# Patient Record
Sex: MALE | Race: WHITE | NOT HISPANIC OR LATINO | Employment: OTHER | URBAN - METROPOLITAN AREA
[De-identification: names, ages, dates, MRNs, and addresses within clinical notes are randomized per-mention and may not be internally consistent; named-entity substitution may affect disease eponyms.]

---

## 2019-07-12 ENCOUNTER — HOSPITAL ENCOUNTER (INPATIENT)
Facility: HOSPITAL | Age: 79
LOS: 1 days | Discharge: HOME | DRG: 442 | End: 2019-07-12
Attending: INTERNAL MEDICINE | Admitting: INTERNAL MEDICINE
Payer: MEDICARE

## 2019-07-12 VITALS
HEIGHT: 70 IN | TEMPERATURE: 99 F | DIASTOLIC BLOOD PRESSURE: 78 MMHG | RESPIRATION RATE: 17 BRPM | SYSTOLIC BLOOD PRESSURE: 174 MMHG | OXYGEN SATURATION: 97 % | WEIGHT: 207.69 LBS | HEART RATE: 57 BPM | BODY MASS INDEX: 29.73 KG/M2

## 2019-07-12 DIAGNOSIS — I81 PORTAL VEIN THROMBOSIS: ICD-10-CM

## 2019-07-12 DIAGNOSIS — I25.10 CORONARY ARTERY DISEASE INVOLVING NATIVE CORONARY ARTERY OF NATIVE HEART WITHOUT ANGINA PECTORIS: Primary | ICD-10-CM

## 2019-07-12 PROBLEM — N40.0 BPH (BENIGN PROSTATIC HYPERPLASIA): Status: ACTIVE | Noted: 2019-07-12

## 2019-07-12 PROBLEM — R16.0 LIVER MASS: Status: ACTIVE | Noted: 2019-07-12

## 2019-07-12 PROBLEM — R10.9 ABDOMINAL PAIN: Status: ACTIVE | Noted: 2019-07-12

## 2019-07-12 LAB
AFP SERPL-MCNC: 373 NG/ML (ref 0–8.4)
ALBUMIN SERPL BCP-MCNC: 3.5 G/DL (ref 3.5–5.2)
ALP SERPL-CCNC: 93 U/L (ref 55–135)
ALT SERPL W/O P-5'-P-CCNC: 30 U/L (ref 10–44)
ANION GAP SERPL CALC-SCNC: 10 MMOL/L (ref 8–16)
APTT BLDCRRT: 36.9 SEC (ref 21–32)
APTT BLDCRRT: 42.9 SEC (ref 21–32)
AST SERPL-CCNC: 26 U/L (ref 10–40)
BASOPHILS # BLD AUTO: 0.04 K/UL (ref 0–0.2)
BASOPHILS # BLD AUTO: 0.04 K/UL (ref 0–0.2)
BASOPHILS NFR BLD: 0.3 % (ref 0–1.9)
BASOPHILS NFR BLD: 0.3 % (ref 0–1.9)
BILIRUB SERPL-MCNC: 1 MG/DL (ref 0.1–1)
BUN SERPL-MCNC: 11 MG/DL (ref 8–23)
CALCIUM SERPL-MCNC: 9.4 MG/DL (ref 8.7–10.5)
CHLORIDE SERPL-SCNC: 104 MMOL/L (ref 95–110)
CO2 SERPL-SCNC: 25 MMOL/L (ref 23–29)
CREAT SERPL-MCNC: 0.9 MG/DL (ref 0.5–1.4)
DIFFERENTIAL METHOD: ABNORMAL
DIFFERENTIAL METHOD: ABNORMAL
EOSINOPHIL # BLD AUTO: 0.2 K/UL (ref 0–0.5)
EOSINOPHIL # BLD AUTO: 0.2 K/UL (ref 0–0.5)
EOSINOPHIL NFR BLD: 1.5 % (ref 0–8)
EOSINOPHIL NFR BLD: 1.5 % (ref 0–8)
ERYTHROCYTE [DISTWIDTH] IN BLOOD BY AUTOMATED COUNT: 13.1 % (ref 11.5–14.5)
ERYTHROCYTE [DISTWIDTH] IN BLOOD BY AUTOMATED COUNT: 13.1 % (ref 11.5–14.5)
EST. GFR  (AFRICAN AMERICAN): >60 ML/MIN/1.73 M^2
EST. GFR  (NON AFRICAN AMERICAN): >60 ML/MIN/1.73 M^2
GLUCOSE SERPL-MCNC: 119 MG/DL (ref 70–110)
HCT VFR BLD AUTO: 38.9 % (ref 40–54)
HCT VFR BLD AUTO: 38.9 % (ref 40–54)
HGB BLD-MCNC: 12.8 G/DL (ref 14–18)
HGB BLD-MCNC: 12.8 G/DL (ref 14–18)
HIV 1+2 AB+HIV1 P24 AG SERPL QL IA: NEGATIVE
INR PPP: 1 (ref 0.8–1.2)
INR PPP: 1 (ref 0.8–1.2)
LYMPHOCYTES # BLD AUTO: 2.2 K/UL (ref 1–4.8)
LYMPHOCYTES # BLD AUTO: 2.2 K/UL (ref 1–4.8)
LYMPHOCYTES NFR BLD: 19 % (ref 18–48)
LYMPHOCYTES NFR BLD: 19 % (ref 18–48)
MAGNESIUM SERPL-MCNC: 1.8 MG/DL (ref 1.6–2.6)
MCH RBC QN AUTO: 30.8 PG (ref 27–31)
MCH RBC QN AUTO: 30.8 PG (ref 27–31)
MCHC RBC AUTO-ENTMCNC: 32.9 G/DL (ref 32–36)
MCHC RBC AUTO-ENTMCNC: 32.9 G/DL (ref 32–36)
MCV RBC AUTO: 94 FL (ref 82–98)
MCV RBC AUTO: 94 FL (ref 82–98)
MONOCYTES # BLD AUTO: 1.3 K/UL (ref 0.3–1)
MONOCYTES # BLD AUTO: 1.3 K/UL (ref 0.3–1)
MONOCYTES NFR BLD: 11.4 % (ref 4–15)
MONOCYTES NFR BLD: 11.4 % (ref 4–15)
NEUTROPHILS # BLD AUTO: 7.8 K/UL (ref 1.8–7.7)
NEUTROPHILS # BLD AUTO: 7.8 K/UL (ref 1.8–7.7)
NEUTROPHILS NFR BLD: 67.8 % (ref 38–73)
NEUTROPHILS NFR BLD: 67.8 % (ref 38–73)
PHOSPHATE SERPL-MCNC: 3.2 MG/DL (ref 2.7–4.5)
PLATELET # BLD AUTO: 276 K/UL (ref 150–350)
PLATELET # BLD AUTO: 276 K/UL (ref 150–350)
PMV BLD AUTO: 10.2 FL (ref 9.2–12.9)
PMV BLD AUTO: 10.2 FL (ref 9.2–12.9)
POTASSIUM SERPL-SCNC: 4.4 MMOL/L (ref 3.5–5.1)
PROT SERPL-MCNC: 6.9 G/DL (ref 6–8.4)
PROTHROMBIN TIME: 11.3 SEC (ref 9–12.5)
PROTHROMBIN TIME: 11.3 SEC (ref 9–12.5)
RBC # BLD AUTO: 4.15 M/UL (ref 4.6–6.2)
RBC # BLD AUTO: 4.15 M/UL (ref 4.6–6.2)
SODIUM SERPL-SCNC: 139 MMOL/L (ref 136–145)
WBC # BLD AUTO: 11.45 K/UL (ref 3.9–12.7)
WBC # BLD AUTO: 11.45 K/UL (ref 3.9–12.7)

## 2019-07-12 PROCEDURE — 25000242 PHARM REV CODE 250 ALT 637 W/ HCPCS: Performed by: INTERNAL MEDICINE

## 2019-07-12 PROCEDURE — 83735 ASSAY OF MAGNESIUM: CPT

## 2019-07-12 PROCEDURE — 99222 1ST HOSP IP/OBS MODERATE 55: CPT | Mod: ,,, | Performed by: NURSE PRACTITIONER

## 2019-07-12 PROCEDURE — 21400001 HC TELEMETRY ROOM

## 2019-07-12 PROCEDURE — 99222 PR INITIAL HOSPITAL CARE,LEVL II: ICD-10-PCS | Mod: ,,, | Performed by: NURSE PRACTITIONER

## 2019-07-12 PROCEDURE — 80053 COMPREHEN METABOLIC PANEL: CPT

## 2019-07-12 PROCEDURE — 36415 COLL VENOUS BLD VENIPUNCTURE: CPT

## 2019-07-12 PROCEDURE — 85025 COMPLETE CBC W/AUTO DIFF WBC: CPT

## 2019-07-12 PROCEDURE — G0378 HOSPITAL OBSERVATION PER HR: HCPCS

## 2019-07-12 PROCEDURE — 86703 HIV-1/HIV-2 1 RESULT ANTBDY: CPT

## 2019-07-12 PROCEDURE — 85730 THROMBOPLASTIN TIME PARTIAL: CPT

## 2019-07-12 PROCEDURE — 80074 ACUTE HEPATITIS PANEL: CPT

## 2019-07-12 PROCEDURE — 85730 THROMBOPLASTIN TIME PARTIAL: CPT | Mod: 91

## 2019-07-12 PROCEDURE — 85610 PROTHROMBIN TIME: CPT

## 2019-07-12 PROCEDURE — 63600175 PHARM REV CODE 636 W HCPCS: Performed by: INTERNAL MEDICINE

## 2019-07-12 PROCEDURE — 99223 1ST HOSP IP/OBS HIGH 75: CPT | Mod: ,,, | Performed by: INTERNAL MEDICINE

## 2019-07-12 PROCEDURE — 25500020 PHARM REV CODE 255: Performed by: INTERNAL MEDICINE

## 2019-07-12 PROCEDURE — 99223 PR INITIAL HOSPITAL CARE,LEVL III: ICD-10-PCS | Mod: ,,, | Performed by: INTERNAL MEDICINE

## 2019-07-12 PROCEDURE — 25000003 PHARM REV CODE 250: Performed by: INTERNAL MEDICINE

## 2019-07-12 PROCEDURE — 84100 ASSAY OF PHOSPHORUS: CPT

## 2019-07-12 PROCEDURE — 82105 ALPHA-FETOPROTEIN SERUM: CPT

## 2019-07-12 RX ORDER — PANTOPRAZOLE SODIUM 40 MG/1
40 TABLET, DELAYED RELEASE ORAL DAILY
Status: DISCONTINUED | OUTPATIENT
Start: 2019-07-12 | End: 2019-07-12 | Stop reason: HOSPADM

## 2019-07-12 RX ORDER — PHENYLEPHRINE HCL 10 MG
1000 TABLET ORAL DAILY
COMMUNITY

## 2019-07-12 RX ORDER — FLUTICASONE PROPIONATE 50 MCG
1 SPRAY, SUSPENSION (ML) NASAL DAILY
COMMUNITY

## 2019-07-12 RX ORDER — MAG HYDROX/ALUMINUM HYD/SIMETH 200-200-20
30 SUSPENSION, ORAL (FINAL DOSE FORM) ORAL EVERY 6 HOURS PRN
Status: DISCONTINUED | OUTPATIENT
Start: 2019-07-12 | End: 2019-07-12 | Stop reason: HOSPADM

## 2019-07-12 RX ORDER — NAPROXEN SODIUM 220 MG/1
81 TABLET, FILM COATED ORAL DAILY
Status: DISCONTINUED | OUTPATIENT
Start: 2019-07-12 | End: 2019-07-12 | Stop reason: HOSPADM

## 2019-07-12 RX ORDER — FLUTICASONE PROPIONATE 50 MCG
1 SPRAY, SUSPENSION (ML) NASAL DAILY
Status: DISCONTINUED | OUTPATIENT
Start: 2019-07-12 | End: 2019-07-12 | Stop reason: HOSPADM

## 2019-07-12 RX ORDER — ACETAMINOPHEN 325 MG/1
650 TABLET ORAL EVERY 6 HOURS PRN
Status: DISCONTINUED | OUTPATIENT
Start: 2019-07-12 | End: 2019-07-12 | Stop reason: HOSPADM

## 2019-07-12 RX ORDER — TAMSULOSIN HYDROCHLORIDE 0.4 MG/1
0.4 CAPSULE ORAL NIGHTLY
Status: DISCONTINUED | OUTPATIENT
Start: 2019-07-12 | End: 2019-07-12 | Stop reason: HOSPADM

## 2019-07-12 RX ORDER — IPRATROPIUM BROMIDE AND ALBUTEROL SULFATE 2.5; .5 MG/3ML; MG/3ML
3 SOLUTION RESPIRATORY (INHALATION) EVERY 4 HOURS PRN
Status: DISCONTINUED | OUTPATIENT
Start: 2019-07-12 | End: 2019-07-12 | Stop reason: HOSPADM

## 2019-07-12 RX ORDER — HEPARIN SODIUM,PORCINE/D5W 25000/250
12 INTRAVENOUS SOLUTION INTRAVENOUS CONTINUOUS
Status: DISCONTINUED | OUTPATIENT
Start: 2019-07-12 | End: 2019-07-12

## 2019-07-12 RX ORDER — MELOXICAM 15 MG/1
15 TABLET ORAL DAILY PRN
COMMUNITY

## 2019-07-12 RX ORDER — DIPHENHYDRAMINE HCL 25 MG
25 CAPSULE ORAL EVERY 6 HOURS PRN
Status: DISCONTINUED | OUTPATIENT
Start: 2019-07-12 | End: 2019-07-12 | Stop reason: HOSPADM

## 2019-07-12 RX ORDER — ONDANSETRON 2 MG/ML
4 INJECTION INTRAMUSCULAR; INTRAVENOUS EVERY 8 HOURS PRN
Status: DISCONTINUED | OUTPATIENT
Start: 2019-07-12 | End: 2019-07-12 | Stop reason: HOSPADM

## 2019-07-12 RX ORDER — ATORVASTATIN CALCIUM 40 MG/1
40 TABLET, FILM COATED ORAL DAILY
Status: DISCONTINUED | OUTPATIENT
Start: 2019-07-12 | End: 2019-07-12 | Stop reason: HOSPADM

## 2019-07-12 RX ORDER — METOPROLOL SUCCINATE 25 MG/1
25 TABLET, EXTENDED RELEASE ORAL DAILY
Status: DISCONTINUED | OUTPATIENT
Start: 2019-07-12 | End: 2019-07-12 | Stop reason: HOSPADM

## 2019-07-12 RX ORDER — HYDRALAZINE HYDROCHLORIDE 20 MG/ML
10 INJECTION INTRAMUSCULAR; INTRAVENOUS EVERY 6 HOURS PRN
Status: DISCONTINUED | OUTPATIENT
Start: 2019-07-12 | End: 2019-07-12 | Stop reason: HOSPADM

## 2019-07-12 RX ADMIN — PANTOPRAZOLE SODIUM 40 MG: 40 TABLET, DELAYED RELEASE ORAL at 08:07

## 2019-07-12 RX ADMIN — IOHEXOL 100 ML: 350 INJECTION, SOLUTION INTRAVENOUS at 11:07

## 2019-07-12 RX ADMIN — ASPIRIN 81 MG CHEWABLE TABLET 81 MG: 81 TABLET CHEWABLE at 08:07

## 2019-07-12 RX ADMIN — HEPARIN SODIUM AND DEXTROSE 13 UNITS/KG/HR: 10000; 5 INJECTION INTRAVENOUS at 01:07

## 2019-07-12 RX ADMIN — ATORVASTATIN CALCIUM 40 MG: 40 TABLET, FILM COATED ORAL at 08:07

## 2019-07-12 RX ADMIN — FLUTICASONE PROPIONATE 50 MCG: 50 SPRAY, METERED NASAL at 08:07

## 2019-07-12 RX ADMIN — HEPARIN SODIUM AND DEXTROSE 12 UNITS/KG/HR: 10000; 5 INJECTION INTRAVENOUS at 06:07

## 2019-07-12 RX ADMIN — APIXABAN 5 MG: 2.5 TABLET, FILM COATED ORAL at 04:07

## 2019-07-12 NOTE — ASSESSMENT & PLAN NOTE
CT imaging reveals portal vein thrombosis.  Currently on IV heparin drip.  Consult IR and GI for further evaluation/recommendations.

## 2019-07-12 NOTE — SUBJECTIVE & OBJECTIVE
Past Medical History:   Diagnosis Date    Arthritis     Coronary artery disease     Hx of heart artery stent        Past Surgical History:   Procedure Laterality Date    CARDIAC SURGERY      stents 12/29/2012    EYE SURGERY      right, skin graft    INJECTION-STEROID-EPIDURAL-CAUDAL N/A 5/20/2016    Performed by Francis Jackson Jr., [facility] at [facility]    SPINAL FUSION      ACDF 05/04/2010    TONSILLECTOMY         Review of patient's allergies indicates:   Allergen Reactions    Pcn [penicillins] Hives     Family History     None        Tobacco Use    Smoking status: Former Smoker    Smokeless tobacco: Former User     Quit date: 5/18/1986   Substance and Sexual Activity    Alcohol use: No    Drug use: Not on file    Sexual activity: Not on file     Review of Systems   Constitutional: Negative for activity change and appetite change.   HENT: Negative for sore throat and trouble swallowing.    Eyes: Negative for pain and discharge.   Respiratory: Negative for cough and chest tightness.    Cardiovascular: Negative for chest pain and palpitations.   Gastrointestinal: Positive for abdominal pain. Negative for blood in stool, constipation, diarrhea, nausea and vomiting.   Genitourinary: Negative for dysuria, frequency and hematuria.   Skin: Negative for color change and rash.   Neurological: Negative for speech difficulty, weakness and headaches.   Psychiatric/Behavioral: Negative for confusion and sleep disturbance.     Objective:     Vital Signs (Most Recent):  Temp: 98.5 °F (36.9 °C) (07/12/19 1120)  Pulse: (!) 57 (07/12/19 1120)  Resp: 18 (07/12/19 1120)  BP: (!) 193/81 (07/12/19 1120)  SpO2: 95 % (07/12/19 1120) Vital Signs (24h Range):  Temp:  [97.7 °F (36.5 °C)-98.5 °F (36.9 °C)] 98.5 °F (36.9 °C)  Pulse:  [49-57] 57  Resp:  [18] 18  SpO2:  [95 %-97 %] 95 %  BP: (130-193)/(63-81) 193/81     Weight: 94.2 kg (207 lb 10.8 oz) (07/12/19 0300)  Body mass index is 29.8 kg/m².      Intake/Output Summary (Last 24  hours) at 7/12/2019 1229  Last data filed at 7/12/2019 0800  Gross per 24 hour   Intake --   Output 400 ml   Net -400 ml       Lines/Drains/Airways          None          Physical Exam   Constitutional: He is oriented to person, place, and time. No distress.   Cardiovascular: Normal rate, regular rhythm and normal heart sounds.   No murmur heard.  Pulmonary/Chest: Effort normal and breath sounds normal. No stridor. No respiratory distress.   Abdominal: Soft. Bowel sounds are normal. He exhibits no distension. There is no tenderness.   Neurological: He is alert and oriented to person, place, and time. No cranial nerve deficit. Coordination normal.   Skin: Skin is warm and dry.   Psychiatric: He has a normal mood and affect.       Significant Labs:  CBC:   Recent Labs   Lab 07/12/19 0455   WBC 11.45  11.45   HGB 12.8*  12.8*   HCT 38.9*  38.9*     276     CMP:   Recent Labs   Lab 07/12/19 0455   *   CALCIUM 9.4   ALBUMIN 3.5   PROT 6.9      K 4.4   CO2 25      BUN 11   CREATININE 0.9   ALKPHOS 93   ALT 30   AST 26   BILITOT 1.0     Coagulation:   Recent Labs   Lab 07/12/19 0455   INR 1.0  1.0   APTT 42.9*       Significant Imaging:  Imaging results within the past 24 hours have been reviewed.

## 2019-07-12 NOTE — CONSULTS
Thank you for the consult.     Review of the chart reveals a 78-year-old male with recent diagnosis of portal vein thrombosis. Patient presented with abdominal discomfort.     Outside CT was reviewed directly by myself and demonstrates heterogeneous appearance of the liver. I am concerned about a mass within the left hepatic lobe with tumor thrombus extending into the left and main portal vein with partially occlusive bland thrombus involving the proximal main portal vein and SMV. Intrahepatic portal v is expanded and appears to enhance which is concerning for tumor thrombus. This is not fully evaluated on single contrast enhanced imaging. Recommend repeat triple phase CT or MRI.     No catheter directed thrombolysis is recommended at this time. Systemic anticoagulation has been initiated and is recommended. If a mass is seen by triple phase imaging, patient may be a candidate for locoregional therapy with Y-90 depending on synthetic function of the liver.

## 2019-07-12 NOTE — HPI
"Patient is a 78 year old male who presented to [facility] for epigastric and lower abdominal pain. CT scan was done that showed PVT and was transferred here for further evaluation/management. Image reviewed by Dr. Ham who described suspicious mass in the left hepatic lobe and concern for tumor thrombus. Triple phase CT scan was ordered and showed "Left segment 4A/B 5.1 cm mass compatible with LIRADS-5 hepatocellular carcinoma.  Also noted is extensive portal vein thrombus involving the left and right portal veins extending into the main portal vein.  On arterial phase, some of this thrombus appears to be enhancing, very suspicious for tumor thrombus.  No appreciable extrahepatic disease". He does not have any overt evidence of liver cirrhosis. He does not have any risk factors for liver disease. He does not drink alcohol. He denies any prior IV drug use, tattoos, or blood transfusions. He does not have any risk factors for fatty liver disease. No family history of liver disease.   "

## 2019-07-12 NOTE — PLAN OF CARE
Met with patient and family. Patient is independent with adls and iadls.  Patient denies any post hospital needs or services at this time. He has had home health in the past after his neck and back surgery.  Does not remember the name of the agency.  Patient lives with his wife, Maddy Arellano.  He requested [facility] Pharm[facility] delivery, referral entered.  He does not have and Advanced Directive/Living will.  Encouraged to review information given. States he has access to [facility].       Updated white board with 's name and number. Transitional Care Folder, Discharge Planning Begins on Admission pamphlet, [facility] Bedside Delivery pamphlet, Advance Directive information given to patient along with the contact information given.Instructed patient or family to call with any questions or concerns.        [facility] - [place], [place] - [address]  [facility] [place] [address]  Phone: [phone] Fax: [phone]    David Stevens MD  Payor: MEDICARE / Plan: MEDICARE PART A & B / Product Type: [facility] /         07/12/19 1059   Discharge Assessment   Assessment Type Discharge Planning Assessment   Confirmed/corrected address and phone number on facesheet? Yes   Assessment information obtained from? Patient;Caregiver   Expected Length of Stay (days)   (TBD)   Communicated expected length of stay with patient/caregiver yes   Prior to hospitilization cognitive status: Alert/Oriented   Prior to hospitalization functional status: Independent   Current cognitive status: Alert/Oriented   Current Functional Status: Independent   Lives With spouse   Able to Return to Prior Arrangements yes   Is patient able to care for self after discharge? Yes   Who are your caregiver(s) and their phone number(s)? Maddy Arellano, spouse  [phone]   Patient's perception of discharge disposition home or selfcare   Patient currently being followed by outpatient case management? No   Patient currently receives any other  outside agency services? No   Equipment Currently Used at Home glucometer   Do you have any problems affording any of your prescribed medications? No   Is the patient taking medications as prescribed? yes   Does the patient have transportation home? Yes   Transportation Anticipated family or friend will provide   Does the patient receive services at the [facility]? No   Discharge Plan A Home with family   Discharge Plan B Home Health   DME Needed Upon Discharge  none   Patient/Family in Agreement with Plan yes

## 2019-07-12 NOTE — ASSESSMENT & PLAN NOTE
Very suspicious for tumor thrombus  Will ask for hematology to weigh in on anticoagulation needs.

## 2019-07-12 NOTE — NURSING
Went over discharge instructions with patient and wife.   Stressed importance of making and keeping all follow ups as well as making prescribed medication changes.   Prescription x1 to be delivered to pt bedside via [facility] prior to discharge.  Patient verbalized understanding and has no questions in regards to discharge.  IV and tele monitor removed per primary nurse.  Patient awaiting med delivery, instructed to call nurse station once med has arrived.  Primary nurse notified of pt's discharge status.

## 2019-07-12 NOTE — PLAN OF CARE
Problem: Fall Injury Risk  Goal: Absence of Fall and Fall-Related Injury    Intervention: Promote Injury-Free Environment  Patient awake and alert resting in bed, sinus bradycardic rhythm on monitor, denies pain at this time, frequent weight shift encouraged, POC reviewed with patient,  bed low locked, call light within reach, will continue to monitor

## 2019-07-12 NOTE — CONSULTS
"[facility] -   Gastroenterology  Consult Note    Patient Name: Ottoniel Arellano Jr.  MRN: [id number]  Admission Date: 7/12/2019  Hospital Length of Stay: 0 days  Code Status: No Order   Attending Provider: Jeremi Chauhan MD   Consulting Provider: Priscilla Juarez NP  Primary Care Physician: David Stevens MD  Principal Problem:Portal vein thrombosis    Inpatient consult to Gastroenterology  Consult performed by: Priscilla Juarez NP  Consult ordered by: Ari Swanson MD        Subjective:     HPI:  Patient is a 78 year old male who presented to [facility] for epigastric and lower abdominal pain. CT scan was done that showed PVT and was transferred here for further evaluation/management. Image reviewed by Dr. Ham who described suspicious mass in the left hepatic lobe and concern for tumor thrombus. Triple phase CT scan was ordered and showed "Left segment 4A/B 5.1 cm mass compatible with LIRADS-5 hepatocellular carcinoma.  Also noted is extensive portal vein thrombus involving the left and right portal veins extending into the main portal vein.  On arterial phase, some of this thrombus appears to be enhancing, very suspicious for tumor thrombus.  No appreciable extrahepatic disease". He does not have any overt evidence of liver cirrhosis. He does not have any risk factors for liver disease. He does not drink alcohol. He denies any prior IV drug use, tattoos, or blood transfusions. He does not have any risk factors for fatty liver disease. No family history of liver disease.     No new subjective & objective note has been filed under this hospital service since the last note was generated.    Assessment/Plan:     * Portal vein thrombosis  Very suspicious for tumor thrombus  Will ask for hem/onc to weigh in on anticoagulation needs and liver mass.        Liver mass  Patient presented to [facility] for PVT.   - imaging reviewed which revealed suspicious liver mass with concern for tumor " thrombus  - HCC was confirmed on CT swan. Very suspicious of tumor thrombus.   - Per radiologist, If a mass is seen by triple phase imaging, patient may be a candidate for locoregional therapy with Y-90 depending on synthetic function of the liver.   - will also ask Hem/onc to weigh in       Thank you for your consult. I will follow-up with patient. Please contact us if you have any additional questions.    Priscilla Juarez NP  Gastroenterology  [facility] [place]

## 2019-07-12 NOTE — ASSESSMENT & PLAN NOTE
Reviewed images with patient and photograph demonstrating clot in portal vein as well as large left liver mass.  Highly suggestive of probable hepatocellular carcinoma.  Liver function studies ordered alpha fetoprotein ordered.  Patient should be treated with intravenous heparin and subsequently converted to oral agents such as Eliquis.  Low-molecular heparin warfarin or best known but there have been increasing reports of using Eliquis in this setting.  Patient has had a dramatic improvement over last 24 hr once patient is discharged would see in the outpatient setting to arrange for CT-directed biopsy of liver mass.  Information on acute portal vein thrombosis from up-to-date followed to patient electronically

## 2019-07-12 NOTE — HPI
Mr. Arellano is a 78-year-old  male with PMH significant for HTN, CAD, BPH, was transferred from [facility] for higher level of care for portal vein thrombosis.  Patient reports that for the past two weeks he has been having abdominal discomfort mainly in the upper abdomen.  Yesterday afternoon he started having lower abdominal discomfort, and he could not pass it off for gas.  Hence presented to the ED.  CT abdomen reveals portal vein thrombosis.  Patient was started on heparin drip and transferred here for further management.  Patient complains of mild nausea, but denies vomiting, diarrhea.  Denies shortness of breath.  Laboratory workup is unremarkable, all within normal limits.  Admitting diagnosis portal vein thrombosis, on heparin drip.

## 2019-07-12 NOTE — CONSULTS
[facility] -   Hematology/Oncology  Consult Note    Patient Name: Ottoniel Arellnao Jr.  MRN: [id number]  Admission Date: 7/12/2019  Hospital Length of Stay: 0 days  Code Status: No Order   Attending Provider: Jeremi Chauhan MD  Consulting Provider: Jarod Kevin MD  Primary Care Physician: David Stevens MD  Principal Problem:Portal vein thrombosis    Consults  Subjective:     HPI:  78-year-old male with increasing abdominal pain right upper quadrant over the last 3 weeks patient had severe pain after eating yesterday was brought to the emergency room patient underwent a CT abdomen pelvis was found to have acute portal vein thrombosis with a large left hepatocellular mass.  We were asked to see the patient for further evaluation    Oncology Treatment Plan:   [No treatment plan]    Medications:  Continuous Infusions:   heparin (porcine) in D5W 13 Units/kg/hr (07/12/19 1349)     Scheduled Meds:   aspirin  81 mg Oral Daily    atorvastatin  40 mg Oral Daily    fluticasone propionate  1 spray Each Nare Daily    metoprolol succinate  25 mg Oral Daily    pantoprazole  40 mg Oral Daily    tamsulosin  0.4 mg Oral QHS     PRN Meds:acetaminophen, albuterol-ipratropium, aluminum-magnesium hydroxide-simethicone, diphenhydrAMINE, hydrALAZINE, ondansetron     Review of patient's allergies indicates:   Allergen Reactions    Pcn [penicillins] Hives        Past Medical History:   Diagnosis Date    Arthritis     Coronary artery disease     Hx of heart artery stent      Past Surgical History:   Procedure Laterality Date    CARDIAC SURGERY      stents 12/29/2012    EYE SURGERY      right, skin graft    INJECTION-STEROID-EPIDURAL-CAUDAL N/A 5/20/2016    [facility]    SPINAL FUSION      ACDF 05/04/2010    TONSILLECTOMY       Family History     None        Tobacco Use    Smoking status: Former Smoker    Smokeless tobacco: Former User     Quit date: 5/18/1986   Substance  and Sexual Activity    Alcohol use: No    Drug use: Not on file    Sexual activity: Not on file       Review of Systems   Constitutional: Positive for activity change, appetite change and fatigue. Negative for chills, diaphoresis, fever and unexpected weight change.   HENT: Negative for congestion, dental problem, drooling, ear discharge, ear pain, facial swelling, hearing loss, mouth sores, nosebleeds, postnasal drip, rhinorrhea, sinus pressure, sneezing, sore throat, tinnitus, trouble swallowing and voice change.    Eyes: Negative for photophobia, pain, discharge, redness, itching and visual disturbance.   Respiratory: Negative for apnea, cough, choking, chest tightness, shortness of breath, wheezing and stridor.    Cardiovascular: Negative for chest pain, palpitations and leg swelling.   Gastrointestinal: Positive for abdominal pain. Negative for abdominal distention, anal bleeding, blood in stool, constipation, diarrhea, nausea, rectal pain and vomiting.   Endocrine: Negative for cold intolerance, heat intolerance, polydipsia, polyphagia and polyuria.   Genitourinary: Negative for decreased urine volume, difficulty urinating, discharge, dysuria, enuresis, flank pain, frequency, genital sores, hematuria, penile pain, penile swelling, scrotal swelling, testicular pain and urgency.   Musculoskeletal: Negative for arthralgias, back pain, gait problem, joint swelling, myalgias, neck pain and neck stiffness.   Skin: Negative for color change, pallor, rash and wound.   Allergic/Immunologic: Negative for environmental allergies, food allergies and immunocompromised state.   Neurological: Positive for weakness. Negative for dizziness, tremors, seizures, syncope, facial asymmetry, speech difficulty, light-headedness, numbness and headaches.   Hematological: Negative for adenopathy. Does not bruise/bleed easily.   Psychiatric/Behavioral: Positive for dysphoric mood. Negative for agitation, behavioral problems,  confusion, decreased concentration, hallucinations, self-injury, sleep disturbance and suicidal ideas. The patient is nervous/anxious. The patient is not hyperactive.      Objective:     Vital Signs (Most Recent):  Temp: 98.5 °F (36.9 °C) (07/12/19 1120)  Pulse: (!) 57 (07/12/19 1120)  Resp: 18 (07/12/19 1120)  BP: (!) 193/81 (07/12/19 1120)  SpO2: 95 % (07/12/19 1120) Vital Signs (24h Range):  Temp:  [97.7 °F (36.5 °C)-98.5 °F (36.9 °C)] 98.5 °F (36.9 °C)  Pulse:  [49-57] 57  Resp:  [18] 18  SpO2:  [95 %-97 %] 95 %  BP: (130-193)/(63-81) 193/81     Weight: 94.2 kg (207 lb 10.8 oz)  Body mass index is 29.8 kg/m².  Body surface area is 2.16 meters squared.      Intake/Output Summary (Last 24 hours) at 7/12/2019 1517  Last data filed at 7/12/2019 1400  Gross per 24 hour   Intake --   Output 1000 ml   Net -1000 ml       Physical Exam   Constitutional: He is oriented to person, place, and time. He appears distressed.   HENT:   Head: Normocephalic.   Right Ear: External ear normal.   Left Ear: External ear normal.   Nose: Nose normal. Right sinus exhibits no maxillary sinus tenderness and no frontal sinus tenderness. Left sinus exhibits no maxillary sinus tenderness and no frontal sinus tenderness.   Mouth/Throat: Oropharynx is clear and moist. No oropharyngeal exudate.   Eyes: Pupils are equal, round, and reactive to light. EOM and lids are normal. Right eye exhibits no discharge. Left eye exhibits no discharge. Right conjunctiva is not injected. Right conjunctiva has no hemorrhage. Left conjunctiva is not injected. Left conjunctiva has no hemorrhage. No scleral icterus. Right eye exhibits normal extraocular motion. Left eye exhibits normal extraocular motion.   Neck: Normal range of motion. Neck supple. No JVD present. No tracheal deviation present. No thyromegaly present.   Cardiovascular: Normal rate, regular rhythm and normal heart sounds.   Pulmonary/Chest: Effort normal and breath sounds normal. No stridor. No  respiratory distress.   Abdominal: Soft. Bowel sounds are normal. He exhibits no mass. There is no hepatosplenomegaly, splenomegaly or hepatomegaly. There is tenderness.       Musculoskeletal: Normal range of motion. He exhibits no edema or tenderness.   Lymphadenopathy:        Head (right side): No posterior auricular and no occipital adenopathy present.        Head (left side): No posterior auricular and no occipital adenopathy present.     He has no cervical adenopathy.        Right cervical: No superficial cervical, no deep cervical and no posterior cervical adenopathy present.       Left cervical: No superficial cervical, no deep cervical and no posterior cervical adenopathy present.     He has no axillary adenopathy.        Right: No supraclavicular adenopathy present.        Left: No supraclavicular adenopathy present.   Neurological: He is alert and oriented to person, place, and time. He has normal strength. No cranial nerve deficit. Coordination normal.   Skin: Skin is dry. No rash noted. He is not diaphoretic. No cyanosis or erythema. Nails show no clubbing.   Psychiatric: He has a normal mood and affect. His behavior is normal. Judgment and thought content normal. Cognition and memory are normal.   Vitals reviewed.      Significant Labs:   BMP:   Recent Labs   Lab 07/12/19  0455   *      K 4.4      CO2 25   BUN 11   CREATININE 0.9   CALCIUM 9.4   MG 1.8   , CBC:   Recent Labs   Lab 07/12/19 0455   WBC 11.45  11.45   HGB 12.8*  12.8*   HCT 38.9*  38.9*     276   , CMP:   Recent Labs   Lab 07/12/19  0455      K 4.4      CO2 25   *   BUN 11   CREATININE 0.9   CALCIUM 9.4   PROT 6.9   ALBUMIN 3.5   BILITOT 1.0   ALKPHOS 93   AST 26   ALT 30   ANIONGAP 10   EGFRNONAA >60   , Coagulation:   Recent Labs   Lab 07/12/19  0455 07/12/19  1252   INR 1.0  1.0  --    APTT 42.9* 36.9*   , Haptoglobin: No results for input(s): HAPTOGLOBIN in the last 48 hours.,  Immunology: No results for input(s): SPEP, HANNAH, MOISES, FREELAMBDALI in the last 48 hours., LDH: No results for input(s): LDHCSF, BFSOURCE in the last 48 hours., LFTs:   Recent Labs   Lab 07/12/19  0455   ALT 30   AST 26   ALKPHOS 93   BILITOT 1.0   PROT 6.9   ALBUMIN 3.5   , Reticulocytes: No results for input(s): RETIC in the last 48 hours., Tumor Markers: No results for input(s): PSA, CEA, , AFPTM, [id number],  in the last 48 hours.    Invalid input(s): ALGTM, Uric Acid No results for input(s): URICACID in the last 48 hours. and Urine Studies: No results for input(s): COLORU, APPEARANCEUA, PHUR, SPECGRAV, PROTEINUA, GLUCUA, KETONESU, BILIRUBINUA, OCCULTUA, NITRITE, UROBILINOGEN, LEUKOCYTESUR, RBCUA, WBCUA, BACTERIA, SQUAMEPITHEL, HYALINECASTS in the last 48 hours.    Invalid input(s): WRIGHTSUR    Diagnostic Results:  I have reviewed all pertinent imaging results/findings within the past 24 hours.    Assessment/Plan:     * Portal vein thrombosis  Reviewed images with patient and photograph demonstrating clot in portal vein as well as large left liver mass.  Highly suggestive of probable hepatocellular carcinoma.  Liver function studies ordered alpha fetoprotein ordered.  Patient should be treated with intravenous heparin and subsequently converted to oral agents such as Eliquis.  Low-molecular heparin warfarin or best known but there have been increasing reports of using Eliquis in this setting.  Patient has had a dramatic improvement over last 24 hr once patient is discharged would see in the outpatient setting to arrange for CT-directed biopsy of liver mass.  Information on acute portal vein thrombosis from up-to-date followed to patient electronically discussed case personally with Hospital Medicine attending        Thank you for your consult. I will follow-up with patient. Please contact us if you have any additional questions.    Jarod Kevin MD  Hematology/Oncology  [facility] -

## 2019-07-12 NOTE — SUBJECTIVE & OBJECTIVE
Oncology Treatment Plan:   [No treatment plan]    Medications:  Continuous Infusions:   heparin (porcine) in D5W 13 Units/kg/hr (07/12/19 1349)     Scheduled Meds:   aspirin  81 mg Oral Daily    atorvastatin  40 mg Oral Daily    fluticasone propionate  1 spray Each Nare Daily    metoprolol succinate  25 mg Oral Daily    pantoprazole  40 mg Oral Daily    tamsulosin  0.4 mg Oral QHS     PRN Meds:acetaminophen, albuterol-ipratropium, aluminum-magnesium hydroxide-simethicone, diphenhydrAMINE, hydrALAZINE, ondansetron     Review of patient's allergies indicates:   Allergen Reactions    Pcn [penicillins] Hives        Past Medical History:   Diagnosis Date    Arthritis     Coronary artery disease     Hx of heart artery stent      Past Surgical History:   Procedure Laterality Date    CARDIAC SURGERY      stents 12/29/2012    EYE SURGERY      right, skin graft    INJECTION-STEROID-EPIDURAL-CAUDAL N/A 5/20/2016    Performed by Francis Jackson Jr., MD at [facility]    SPINAL FUSION      ACDF 05/04/2010    TONSILLECTOMY       Family History     None        Tobacco Use    Smoking status: Former Smoker    Smokeless tobacco: Former User     Quit date: 5/18/1986   Substance and Sexual Activity    Alcohol use: No    Drug use: Not on file    Sexual activity: Not on file       Review of Systems   Constitutional: Positive for activity change, appetite change and fatigue. Negative for chills, diaphoresis, fever and unexpected weight change.   HENT: Negative for congestion, dental problem, drooling, ear discharge, ear pain, facial swelling, hearing loss, mouth sores, nosebleeds, postnasal drip, rhinorrhea, sinus pressure, sneezing, sore throat, tinnitus, trouble swallowing and voice change.    Eyes: Negative for photophobia, pain, discharge, redness, itching and visual disturbance.   Respiratory: Negative for apnea, cough, choking, chest tightness, shortness of breath, wheezing and stridor.    Cardiovascular: Negative  for chest pain, palpitations and leg swelling.   Gastrointestinal: Positive for abdominal pain. Negative for abdominal distention, anal bleeding, blood in stool, constipation, diarrhea, nausea, rectal pain and vomiting.   Endocrine: Negative for cold intolerance, heat intolerance, polydipsia, polyphagia and polyuria.   Genitourinary: Negative for decreased urine volume, difficulty urinating, discharge, dysuria, enuresis, flank pain, frequency, genital sores, hematuria, penile pain, penile swelling, scrotal swelling, testicular pain and urgency.   Musculoskeletal: Negative for arthralgias, back pain, gait problem, joint swelling, myalgias, neck pain and neck stiffness.   Skin: Negative for color change, pallor, rash and wound.   Allergic/Immunologic: Negative for environmental allergies, food allergies and immunocompromised state.   Neurological: Positive for weakness. Negative for dizziness, tremors, seizures, syncope, facial asymmetry, speech difficulty, light-headedness, numbness and headaches.   Hematological: Negative for adenopathy. Does not bruise/bleed easily.   Psychiatric/Behavioral: Positive for dysphoric mood. Negative for agitation, behavioral problems, confusion, decreased concentration, hallucinations, self-injury, sleep disturbance and suicidal ideas. The patient is nervous/anxious. The patient is not hyperactive.      Objective:     Vital Signs (Most Recent):  Temp: 98.5 °F (36.9 °C) (07/12/19 1120)  Pulse: (!) 57 (07/12/19 1120)  Resp: 18 (07/12/19 1120)  BP: (!) 193/81 (07/12/19 1120)  SpO2: 95 % (07/12/19 1120) Vital Signs (24h Range):  Temp:  [97.7 °F (36.5 °C)-98.5 °F (36.9 °C)] 98.5 °F (36.9 °C)  Pulse:  [49-57] 57  Resp:  [18] 18  SpO2:  [95 %-97 %] 95 %  BP: (130-193)/(63-81) 193/81     Weight: 94.2 kg (207 lb 10.8 oz)  Body mass index is 29.8 kg/m².  Body surface area is 2.16 meters squared.      Intake/Output Summary (Last 24 hours) at 7/12/2019 4365  Last data filed at 7/12/2019  1400  Gross per 24 hour   Intake --   Output 1000 ml   Net -1000 ml       Physical Exam   Constitutional: He is oriented to person, place, and time. He appears distressed.   HENT:   Head: Normocephalic.   Right Ear: External ear normal.   Left Ear: External ear normal.   Nose: Nose normal. Right sinus exhibits no maxillary sinus tenderness and no frontal sinus tenderness. Left sinus exhibits no maxillary sinus tenderness and no frontal sinus tenderness.   Mouth/Throat: Oropharynx is clear and moist. No oropharyngeal exudate.   Eyes: Pupils are equal, round, and reactive to light. EOM and lids are normal. Right eye exhibits no discharge. Left eye exhibits no discharge. Right conjunctiva is not injected. Right conjunctiva has no hemorrhage. Left conjunctiva is not injected. Left conjunctiva has no hemorrhage. No scleral icterus. Right eye exhibits normal extraocular motion. Left eye exhibits normal extraocular motion.   Neck: Normal range of motion. Neck supple. No JVD present. No tracheal deviation present. No thyromegaly present.   Cardiovascular: Normal rate, regular rhythm and normal heart sounds.   Pulmonary/Chest: Effort normal and breath sounds normal. No stridor. No respiratory distress.   Abdominal: Soft. Bowel sounds are normal. He exhibits no mass. There is no hepatosplenomegaly, splenomegaly or hepatomegaly. There is tenderness.       Musculoskeletal: Normal range of motion. He exhibits no edema or tenderness.   Lymphadenopathy:        Head (right side): No posterior auricular and no occipital adenopathy present.        Head (left side): No posterior auricular and no occipital adenopathy present.     He has no cervical adenopathy.        Right cervical: No superficial cervical, no deep cervical and no posterior cervical adenopathy present.       Left cervical: No superficial cervical, no deep cervical and no posterior cervical adenopathy present.     He has no axillary adenopathy.        Right: No  supraclavicular adenopathy present.        Left: No supraclavicular adenopathy present.   Neurological: He is alert and oriented to person, place, and time. He has normal strength. No cranial nerve deficit. Coordination normal.   Skin: Skin is dry. No rash noted. He is not diaphoretic. No cyanosis or erythema. Nails show no clubbing.   Psychiatric: He has a normal mood and affect. His behavior is normal. Judgment and thought content normal. Cognition and memory are normal.   Vitals reviewed.      Significant Labs:   BMP:   Recent Labs   Lab 07/12/19 0455   *      K 4.4      CO2 25   BUN 11   CREATININE 0.9   CALCIUM 9.4   MG 1.8   , CBC:   Recent Labs   Lab 07/12/19 0455   WBC 11.45  11.45   HGB 12.8*  12.8*   HCT 38.9*  38.9*     276   , CMP:   Recent Labs   Lab 07/12/19 0455      K 4.4      CO2 25   *   BUN 11   CREATININE 0.9   CALCIUM 9.4   PROT 6.9   ALBUMIN 3.5   BILITOT 1.0   ALKPHOS 93   AST 26   ALT 30   ANIONGAP 10   EGFRNONAA >60   , Coagulation:   Recent Labs   Lab 07/12/19 0455 07/12/19  1252   INR 1.0  1.0  --    APTT 42.9* 36.9*   , Haptoglobin: No results for input(s): HAPTOGLOBIN in the last 48 hours., Immunology: No results for input(s): SPEP, HANNAH, MOISES, FREELAMBDALI in the last 48 hours., LDH: No results for input(s): LDHCSF, BFSOURCE in the last 48 hours., LFTs:   Recent Labs   Lab 07/12/19 0455   ALT 30   AST 26   ALKPHOS 93   BILITOT 1.0   PROT 6.9   ALBUMIN 3.5   , Reticulocytes: No results for input(s): RETIC in the last 48 hours., Tumor Markers: No results for input(s): PSA, CEA, , AFPTM, [id number],  in the last 48 hours.    Invalid input(s): ALGTM, Uric Acid No results for input(s): URICACID in the last 48 hours. and Urine Studies: No results for input(s): COLORU, APPEARANCEUA, PHUR, SPECGRAV, PROTEINUA, GLUCUA, KETONESU, BILIRUBINUA, OCCULTUA, NITRITE, UROBILINOGEN, LEUKOCYTESUR, RBCUA, WBCUA, BACTERIA, SQUAMEPITHEL, HYALINECASTS  in the last 48 hours.    Invalid input(s): LENO    Diagnostic Results:  I have reviewed all pertinent imaging results/findings within the past 24 hours.

## 2019-07-12 NOTE — H&P
[facility] Medicine  History & Physical    Patient Name: Ottoniel Arellano Jr.  MRN: [id number]  Admission Date: 7/12/2019  Attending Physician: Ari Swanson MD  Primary Care Provider: David Stevens MD         Patient information was obtained from patient, past medical records and ER records.     Subjective:     Principal Problem:Portal vein thrombosis    Chief Complaint: No chief complaint on file.       HPI: Mr. Arellano is a 78-year-old  male with PMH significant for HTN, CAD, BPH, was transferred from [facility] for higher level of care for portal vein thrombosis.  Patient reports that for the past two weeks he has been having abdominal discomfort mainly in the upper abdomen.  Yesterday afternoon he started having lower abdominal discomfort, and he could not pass it off for gas.  Hence presented to the ED.  CT abdomen reveals portal vein thrombosis.  Patient was started on heparin drip and transferred here for further management.  Patient complains of mild nausea, but denies vomiting, diarrhea.  Denies shortness of breath.  Laboratory workup is unremarkable, all within normal limits.  Admitting diagnosis portal vein thrombosis, on heparin drip.    Past Medical History:   Diagnosis Date    Arthritis     Coronary artery disease     Hx of heart artery stent        Past Surgical History:   Procedure Laterality Date    CARDIAC SURGERY      stents 12/29/2012    EYE SURGERY      right, skin graft    INJECTION-STEROID-EPIDURAL-CAUDAL N/A 5/20/2016    Performed by Francis Jackson Jr., MD at [facility] OR    SPINAL FUSION      ACDF 05/04/2010    TONSILLECTOMY         Review of patient's allergies indicates:   Allergen Reactions    Pcn [penicillins] Hives       No current facility-administered medications on file prior to encounter.      Current Outpatient Medications on File Prior to Encounter   Medication Sig    cinnamon bark 500 mg capsule Take 1,000 mg by mouth once daily.     fluticasone propionate (FLONASE ALLERGY RELIEF) 50 mcg/actuation nasal spray 1 spray by Each Nare route once daily.    meloxicam (MOBIC) 15 MG tablet Take 15 mg by mouth daily as needed for Pain.    ascorbic acid (VITAMIN C) 500 MG tablet Take 500 mg by mouth once daily.    aspirin 81 MG Chew Take 81 mg by mouth once daily.    atorvastatin (LIPITOR) 40 MG tablet Take 40 mg by mouth once daily.     ERGOCALCIFEROL, VITAMIN D2, (VITAMIN D ORAL) Take 1 tablet by mouth once daily.    fish oil-omega-3 fatty acids 300-1,000 mg capsule Take 2 g by mouth once daily.    metoprolol succinate (TOPROL-XL) 25 MG 24 hr tablet Take 25 mg by mouth once daily.    NEXIUM 40 mg capsule Take 40 mg by mouth daily as needed.     promethazine (PHENERGAN) 12.5 MG Tab Take 1 tablet (12.5 mg total) by mouth every 6 to 8 hours as needed.    tamsulosin (FLOMAX) 0.4 mg Cp24 Take 0.4 mg by mouth every evening.     vitamin E 400 unit Tab Take 1 tablet by mouth once daily.    [DISCONTINUED] hydrocodone-acetaminophen 10-325mg (NORCO)  mg Tab Take 1 tablet by mouth every 6 to 8 hours as needed.    [DISCONTINUED] MEN'S MULTI-VITAMIN ORAL Take 1 tablet by mouth once daily.    [DISCONTINUED] oxycodone-acetaminophen (PERCOCET)  mg per tablet Take 1 tablet by mouth every 6 to 8 hours as needed for Pain.     Family History     Reviewed and Not Pertinent        Tobacco Use    Smoking status: Former Smoker    Smokeless tobacco: Former User     Quit date: 5/18/1986   Substance and Sexual Activity    Alcohol use: No    Drug use: Not on file    Sexual activity: Not on file     Review of Systems   Constitutional: Negative.  Negative for appetite change, fatigue and fever.   HENT: Negative.  Negative for congestion, nosebleeds and sore throat.    Eyes: Negative.  Negative for visual disturbance.   Respiratory: Negative.  Negative for cough, shortness of breath and wheezing.    Cardiovascular: Negative.  Negative for chest pain,  palpitations and leg swelling.   Gastrointestinal: Positive for abdominal pain and nausea. Negative for constipation, diarrhea and vomiting.   Endocrine: Negative.  Negative for polydipsia, polyphagia and polyuria.   Genitourinary: Negative.  Negative for dysuria, flank pain, frequency and urgency.   Musculoskeletal: Negative.  Negative for arthralgias, back pain and joint swelling.   Skin: Negative.  Negative for color change, pallor and rash.   Allergic/Immunologic: Negative.  Negative for environmental allergies, food allergies and immunocompromised state.   Neurological: Negative.  Negative for dizziness, syncope, weakness, light-headedness, numbness and headaches.   Hematological: Negative.    Psychiatric/Behavioral: Negative.  Negative for confusion and hallucinations. The patient is not nervous/anxious.    All other systems reviewed and are negative.    Objective:     Vital Signs (Most Recent):  Temp: 97.7 °F (36.5 °C) (07/12/19 0400)  Pulse: (!) 55 (07/12/19 0400)  Resp: 18 (07/12/19 0400)  BP: (!) 181/78 (07/12/19 0400)  SpO2: 97 % (07/12/19 0400) Vital Signs (24h Range):  Temp:  [97.7 °F (36.5 °C)] 97.7 °F (36.5 °C)  Pulse:  [55] 55  Resp:  [18] 18  SpO2:  [97 %] 97 %  BP: (181)/(78) 181/78     Weight: 94.2 kg (207 lb 10.8 oz)  Body mass index is 29.8 kg/m².    Physical Exam   Constitutional: He is oriented to person, place, and time. He appears well-developed and well-nourished. No distress.   HENT:   Head: Normocephalic and atraumatic.   Eyes: Pupils are equal, round, and reactive to light. Conjunctivae and EOM are normal. No scleral icterus.   Neck: Normal range of motion. Neck supple. No thyromegaly present.   Cardiovascular: Normal rate, regular rhythm and normal heart sounds.   No murmur heard.  Pulmonary/Chest: Effort normal and breath sounds normal. No respiratory distress. He has no wheezes. He exhibits no tenderness.   Abdominal: Soft. Bowel sounds are normal. There is tenderness (Mild  generalized abdominal discomfort).   Musculoskeletal: Normal range of motion. He exhibits no edema or tenderness.   Lymphadenopathy:     He has no cervical adenopathy.   Neurological: He is alert and oriented to person, place, and time. No cranial nerve deficit. He exhibits normal muscle tone. Coordination normal.   Skin: Skin is warm and dry. He is not diaphoretic.   Psychiatric: He has a normal mood and affect. His behavior is normal. Thought content normal.   Nursing note and vitals reviewed.        CRANIAL NERVES     CN III, IV, VI   Pupils are equal, round, and reactive to light.  Extraocular motions are normal.        Significant Labs: All pertinent labs within the past 24 hours have been reviewed.     Laboratory workup done at [facility] reviewed, all blood work within normal limits.    Significant Imaging: I have reviewed and interpreted all pertinent imaging results/findings within the past 24 hours.             Assessment/Plan:     * Portal vein thrombosis  CT imaging reveals portal vein thrombosis.  Currently on IV heparin drip.  Consult IR and GI for further evaluation/recommendations.        Abdominal pain  Secondary to portal vein thrombosis of unclear etiology.  Continue pain medications as needed.      CAD (coronary artery disease)  Denies chest pain or shortness of breath.  Continue home dose aspirin, beta-blocker, statin.      BPH (benign prostatic hyperplasia)  Without symptoms.    Continue home dose of Flomax.        VTE Risk Mitigation (From admission, onward)        Ordered     heparin 25,000 units in dextrose 5% 250 ml (100 units/mL) infusion MINIMAL INTENSITY nomogram - [facility]  Continuous      07/12/19 0549     Place sequential compression device  Until discontinued      07/12/19 0554             Ari Swanson MD  Department of Hospital Medicine   [facility]

## 2019-07-12 NOTE — ASSESSMENT & PLAN NOTE
Very suspicious for tumor thrombus  Will ask for hem/onc to weigh in on anticoagulation needs and liver mass.

## 2019-07-12 NOTE — ASSESSMENT & PLAN NOTE
Patient presented to [facility] for PVT.   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 Very suspicious of tumor thrombus.   - Per radiologist, If a mass is seen by triple phase imaging, patient may be a candidate for locoregional therapy with Y-90 depending on synthetic function of the liver.   - will also ask Hem/onc to weigh in

## 2019-07-12 NOTE — PLAN OF CARE
Outside Transfer Acceptance Note    Transferring Physician or Mid Level Provider/Speciality: Dr. Cartwright / ED     Accepting Physician: Jorge Benjamin     Date of Acceptance: 07/12/2019     Code Status: Full    Transferring Facility/Hospital: [facility]     [place] Dest[place] [facility]  [place] Dr. Swanson    Reason [place] [facility]     Report from Transferring Physician or Mid-Level provider/Hospital course:     Patient is a 78 year old male with PMH of DM2, GERD, BPH presented to ED with intermittent abdominal pain for last 2 days. CT abdomen with contrast showed PVT extending to SMV. No masses or ascites on CT. LFT wnl and no prior history of liver disease.     VS: temp 98.1 HR 58 /69  RR 18 O2 96% RA    Labs:    WBC 9.2  Hgb 13.8  Platelet 270    Na 135  K 3.9  CL 99  HCO3: 27  BUN 12  Scr 1.04   Glucose 189    T bili 0.71  AST 36  ALT 42      Meds Given: Heparin bolus >> infusion     To do list upon patient arrival:   Continue heparin infusion   Consult GI     Jorge Benjamin DO   - [facility]   Attending Staff Physician   [facility]

## 2019-07-12 NOTE — HPI
78-year-old male with increasing abdominal pain right upper quadrant over the last 3 weeks patient had severe pain after eating yesterday was brought to the emergency room patient underwent a CT abdomen pelvis was found to have acute portal vein thrombosis with a large left hepatocellular mass.  We were asked to see the patient for further evaluation

## 2019-07-12 NOTE — ASSESSMENT & PLAN NOTE
Patient presented to [facility] for PVT.   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 Very suspicious of tumor thrombus.   - Per radiologist, If a mass is seen by triple phase imaging, patient may be a candidate for locoregional therapy with Y-90 depending on synthetic function of the liver.

## 2019-07-12 NOTE — SUBJECTIVE & OBJECTIVE
Past Medical History:   Diagnosis Date    Arthritis     Coronary artery disease     Hx of heart artery stent        Past Surgical History:   Procedure Laterality Date    CARDIAC SURGERY      stents 12/29/2012    EYE SURGERY      right, skin graft    INJECTION-STEROID-EPIDURAL-CAUDAL N/A 5/20/2016    [facility] by Francis [facility]    SPINAL FUSION      ACDF 05/04/2010    TONSILLECTOMY         Review of patient's allergies indicates:   Allergen Reactions    Pcn [penicillins] Hives       No current facility-administered medications on file prior to encounter.      Current Outpatient Medications on File Prior to Encounter   Medication Sig    cinnamon bark 500 mg capsule Take 1,000 mg by mouth once daily.    fluticasone propionate (FLONASE ALLERGY RELIEF) 50 mcg/actuation nasal spray 1 spray by Each Nare route once daily.    meloxicam (MOBIC) 15 MG tablet Take 15 mg by mouth daily as needed for Pain.    ascorbic acid (VITAMIN C) 500 MG tablet Take 500 mg by mouth once daily.    aspirin 81 MG Chew Take 81 mg by mouth once daily.    atorvastatin (LIPITOR) 40 MG tablet Take 40 mg by mouth once daily.     ERGOCALCIFEROL, VITAMIN D2, (VITAMIN D ORAL) Take 1 tablet by mouth once daily.    fish oil-omega-3 fatty acids 300-1,000 mg capsule Take 2 g by mouth once daily.    metoprolol succinate (TOPROL-XL) 25 MG 24 hr tablet Take 25 mg by mouth once daily.    NEXIUM 40 mg capsule Take 40 mg by mouth daily as needed.     promethazine (PHENERGAN) 12.5 MG Tab Take 1 tablet (12.5 mg total) by mouth every 6 to 8 hours as needed.    tamsulosin (FLOMAX) 0.4 mg Cp24 Take 0.4 mg by mouth every evening.     vitamin E 400 unit Tab Take 1 tablet by mouth once daily.    [DISCONTINUED] hydrocodone-acetaminophen 10-325mg (NORCO)  mg Tab Take 1 tablet by mouth every 6 to 8 hours as needed.    [DISCONTINUED] MEN'S MULTI-VITAMIN ORAL Take 1 tablet by mouth once daily.    [DISCONTINUED]  oxycodone-acetaminophen (PERCOCET)  mg per tablet Take 1 tablet by mouth every 6 to 8 hours as needed for Pain.     Family History     Reviewed and Not Pertinent        Tobacco Use    Smoking status: Former Smoker    Smokeless tobacco: Former User     Quit date: 5/18/1986   Substance and Sexual Activity    Alcohol use: No    Drug use: Not on file    Sexual activity: Not on file     Review of Systems   Constitutional: Negative.  Negative for appetite change, fatigue and fever.   HENT: Negative.  Negative for congestion, nosebleeds and sore throat.    Eyes: Negative.  Negative for visual disturbance.   Respiratory: Negative.  Negative for cough, shortness of breath and wheezing.    Cardiovascular: Negative.  Negative for chest pain, palpitations and leg swelling.   Gastrointestinal: Positive for abdominal pain and nausea. Negative for constipation, diarrhea and vomiting.   Endocrine: Negative.  Negative for polydipsia, polyphagia and polyuria.   Genitourinary: Negative.  Negative for dysuria, flank pain, frequency and urgency.   Musculoskeletal: Negative.  Negative for arthralgias, back pain and joint swelling.   Skin: Negative.  Negative for color change, pallor and rash.   Allergic/Immunologic: Negative.  Negative for environmental allergies, food allergies and immunocompromised state.   Neurological: Negative.  Negative for dizziness, syncope, weakness, light-headedness, numbness and headaches.   Hematological: Negative.    Psychiatric/Behavioral: Negative.  Negative for confusion and hallucinations. The patient is not nervous/anxious.    All other systems reviewed and are negative.    Objective:     Vital Signs (Most Recent):  Temp: 97.7 °F (36.5 °C) (07/12/19 0400)  Pulse: (!) 55 (07/12/19 0400)  Resp: 18 (07/12/19 0400)  BP: (!) 181/78 (07/12/19 0400)  SpO2: 97 % (07/12/19 0400) Vital Signs (24h Range):  Temp:  [97.7 °F (36.5 °C)] 97.7 °F (36.5 °C)  Pulse:  [55] 55  Resp:  [18] 18  SpO2:  [97 %] 97  %  BP: (181)/(78) 181/78     Weight: 94.2 kg (207 lb 10.8 oz)  Body mass index is 29.8 kg/m².    Physical Exam   Constitutional: He is oriented to person, place, and time. He appears well-developed and well-nourished. No distress.   HENT:   Head: Normocephalic and atraumatic.   Eyes: Pupils are equal, round, and reactive to light. Conjunctivae and EOM are normal. No scleral icterus.   Neck: Normal range of motion. Neck supple. No thyromegaly present.   Cardiovascular: Normal rate, regular rhythm and normal heart sounds.   No murmur heard.  Pulmonary/Chest: Effort normal and breath sounds normal. No respiratory distress. He has no wheezes. He exhibits no tenderness.   Abdominal: Soft. Bowel sounds are normal. There is tenderness (Mild generalized abdominal discomfort).   Musculoskeletal: Normal range of motion. He exhibits no edema or tenderness.   Lymphadenopathy:     He has no cervical adenopathy.   Neurological: He is alert and oriented to person, place, and time. No cranial nerve deficit. He exhibits normal muscle tone. Coordination normal.   Skin: Skin is warm and dry. He is not diaphoretic.   Psychiatric: He has a normal mood and affect. His behavior is normal. Thought content normal.   Nursing note and vitals reviewed.        CRANIAL NERVES     CN III, IV, VI   Pupils are equal, round, and reactive to light.  Extraocular motions are normal.        Significant Labs: All pertinent labs within the past 24 hours have been reviewed.     Laboratory workup done at [facility] reviewed, all blood work within normal limits.    Significant Imaging: I have reviewed and interpreted all pertinent imaging results/findings within the past 24 hours.

## 2019-07-13 NOTE — DISCHARGE SUMMARY
[facility] Hospital Medicine  Discharge Summary      Patient Name: Ottoniel Arellano Jr.  MRN: [id number]  Admission Date: 7/12/2019  Hospital Length of Stay: 1 days  Discharge Date and Time: 7/12/2019  5:55 PM  Attending Physician: No att. providers found   Discharging Provider: Jeremi Chauhan MD  Primary Care Provider: David Stevens MD      HPI:   Mr. Arellano is a 78-year-old  male with PMH significant for HTN, CAD, BPH, was transferred from [facility] for higher level of care for portal vein thrombosis.  Patient reports that for the past two weeks he has been having abdominal discomfort mainly in the upper abdomen.  Yesterday afternoon he started having lower abdominal discomfort, and he could not pass it off for gas.  Hence presented to the ED.  CT abdomen reveals portal vein thrombosis.  Patient was started on heparin drip and transferred here for further management.  Patient complains of mild nausea, but denies vomiting, diarrhea.  Denies shortness of breath.  Laboratory workup is unremarkable, all within normal limits.  Admitting diagnosis portal vein thrombosis, on heparin drip.    * No surgery found *      Hospital Course:   Patient 77 yo male admitted to hospital with Portal Vein Thrombosis. Patient evalauted by IR who recommended follow up CT with triple Phase protocol demonstrating :   Left segment 4A/B 5.1 cm mass compatible with LIRADS-5 hepatocellular carcinoma.  Also noted is extensive portal vein thrombus involving the left and right portal veins extending into the main portal vein.  On arterial phase, some of this thrombus appears to be enhancing, very suspicious for tumor thrombus.  No appreciable extrahepatic disease.. Patient AFP elevated at 373. All concerning for Hepatocellular Carcinoma. Patient initiated on eliquis and instructed to follow up with Heme Onc for ongoing identification of Hepatic Pathology. Patient seen and examined on date of discharge prior to  his departure. Patient stable.     Consults:   Consults (From admission, onward)        Status Ordering Provider     Inpatient consult to Gastroenterology  Once     Provider:  Jamarcus Burger III, MD    Completed LAYNE DUMAS     Inpatient consult to Interventional Radiology  Once     Provider:  Rosette Muller RT    Completed LAYNE DUMAS          No new Assessment & Plan notes have been filed under this hospital service since the last note was generated.  Service: Hospital Medicine    Final Active Diagnoses:    Diagnosis Date Noted POA    PRINCIPAL PROBLEM:  Portal vein thrombosis [I81] 07/12/2019 Yes    CAD (coronary artery disease) [I25.10] 07/12/2019 Yes    Abdominal pain [R10.9] 07/12/2019 Yes    BPH (benign prostatic hyperplasia) [N40.0] 07/12/2019 Yes    Liver mass [R16.0] 07/12/2019 Yes      Problems Resolved During this Admission:       Discharged Condition: stable    Disposition: Home or Self Care    Follow Up:  Follow-up Information     David Stevens MD In 3 days.    Specialty:  Family Medicine  Contact information:  [address]  [address]  [place] [address]  [phone]             Jarod Kevin MD In 2 weeks.    Specialty:  Hematology and Oncology  Contact information:  [address]  [place] [address]  [phone]                 Patient Instructions:      Diet Adult Regular     Diet Cardiac     Activity as tolerated     Activity as tolerated       Significant Diagnostic Studies: Labs:   BMP:   Recent Labs   Lab 07/12/19  0455   *      K 4.4      CO2 25   BUN 11   CREATININE 0.9   CALCIUM 9.4   MG 1.8   , CMP   Recent Labs   Lab 07/12/19  0455      K 4.4      CO2 25   *   BUN 11   CREATININE 0.9   CALCIUM 9.4   PROT 6.9   ALBUMIN 3.5   BILITOT 1.0   ALKPHOS 93   AST 26   ALT 30   ANIONGAP 10   ESTGFRAFRICA >60   EGFRNONAA >60   , CBC   Recent Labs   Lab 07/12/19 0455   WBC 11.45  11.45   HGB 12.8*  12.8*   HCT  38.9*  38.9*     276    and All labs within the past 24 hours have been reviewed    Pending Diagnostic Studies:     Procedure Component Value Units Date/Time    Hepatitis panel, acute [[id number]] Collected:  07/12/19 1622    Order Status:  Sent Lab Status:  In process Updated:  07/12/19 2030    Specimen:  Blood          Medications:  Reconciled Home Medications:      Medication List      START taking these medications    ELIQUIS 5 mg Tab  Generic drug:  apixaban  Take 1 tablet (5 mg total) by mouth 2 (two) times daily.        CONTINUE taking these medications    aspirin 81 MG Chew  Take 81 mg by mouth once daily.     atorvastatin 40 MG tablet  Commonly known as:  LIPITOR  Take 40 mg by mouth once daily.     cinnamon bark 500 mg capsule  Take 1,000 mg by mouth once daily.     fish oil-omega-3 fatty acids 300-1,000 mg capsule  Take 2 g by mouth once daily.     FLONASE ALLERGY RELIEF 50 mcg/actuation nasal spray  Generic drug:  fluticasone propionate  1 spray by Each Nare route once daily.     meloxicam 15 MG tablet  Commonly known as:  MOBIC  Take 15 mg by mouth daily as needed for Pain.     metoprolol succinate 25 MG 24 hr tablet  Commonly known as:  TOPROL-XL  Take 25 mg by mouth once daily.     NexIUM 40 MG capsule  Generic drug:  esomeprazole  Take 40 mg by mouth daily as needed.     promethazine 12.5 MG Tab  Commonly known as:  PHENERGAN  Take 1 tablet (12.5 mg total) by mouth every 6 to 8 hours as needed.     tamsulosin 0.4 mg Cap  Commonly known as:  FLOMAX  Take 0.4 mg by mouth every evening.     VITAMIN C 500 MG tablet  Generic drug:  ascorbic acid (vitamin C)  Take 500 mg by mouth once daily.     VITAMIN D ORAL  Take 1 tablet by mouth once daily.     vitamin E 400 unit Tab  Take 1 tablet by mouth once daily.            Indwelling Lines/Drains at time of discharge:   Lines/Drains/Airways          None          Time spent on the discharge of patient: 32 minutes  Patient was seen and examined on the  date of discharge and determined to be suitable for discharge.         Jeremi Chauhan MD  Department of Hospital Medicine  [facility]

## 2019-07-13 NOTE — HOSPITAL COURSE
Patient 77 yo male admitted to hospital with Portal Vein Thrombosis. Patient evalauted by IR who recommended follow up CT with triple Phase protocol demonstrating :   Left segment 4A/B 5.1 cm mass compatible with LIRADS-5 hepatocellular carcinoma.  Also noted is extensive portal vein thrombus involving the left and right portal veins extending into the main portal vein.  On arterial phase, some of this thrombus appears to be enhancing, very suspicious for tumor thrombus.  No appreciable extrahepatic disease.. Patient AFP elevated at 373. All concerning for Hepatocellular Carcinoma. Patient initiated on eliquis and instructed to follow up with Heme Onc for ongoing identification of Hepatic Pathology. Patient seen and examined on date of discharge prior to his departure. Patient stable.

## 2019-07-15 ENCOUNTER — TELEPHONE (OUTPATIENT)
Dept: HEMATOLOGY/ONCOLOGY | Facility: CLINIC | Age: 79
End: 2019-07-15

## 2019-07-15 LAB
HAV IGM SERPL QL IA: ABNORMAL
HBV CORE IGM SERPL QL IA: NEGATIVE
HBV SURFACE AG SERPL QL IA: NEGATIVE
HCV AB SERPL QL IA: NEGATIVE

## 2019-07-15 NOTE — TELEPHONE ENCOUNTER
Spoke with patient, scheduled appointment with Dr. Kevin for this Thursday at 11:00AM. Confirmed appointment time and location.

## 2019-07-15 NOTE — TELEPHONE ENCOUNTER
----- Message from Jarod Kevin MD sent at 7/13/2019  6:49 AM CDT -----  Need to see this week; saw the patient with a portal vein thrombosis in needs to see later this week with family

## 2019-07-16 NOTE — PLAN OF CARE
07/16/19 1509   Final Note   Assessment Type Final Discharge Note   Anticipated Discharge Disposition Home   Right Care Referral Info   Post Acute Recommendation No Care

## 2019-07-18 ENCOUNTER — LAB VISIT (OUTPATIENT)
Dept: LAB | Facility: HOSPITAL | Age: 79
End: 2019-07-18
Attending: INTERNAL MEDICINE
Payer: MEDICARE

## 2019-07-18 ENCOUNTER — OFFICE VISIT (OUTPATIENT)
Dept: HEMATOLOGY/ONCOLOGY | Facility: CLINIC | Age: 79
End: 2019-07-18
Payer: MEDICARE

## 2019-07-18 ENCOUNTER — TELEPHONE (OUTPATIENT)
Dept: HEMATOLOGY/ONCOLOGY | Facility: CLINIC | Age: 79
End: 2019-07-18

## 2019-07-18 VITALS
OXYGEN SATURATION: 98 % | SYSTOLIC BLOOD PRESSURE: 142 MMHG | HEART RATE: 74 BPM | HEIGHT: 70 IN | DIASTOLIC BLOOD PRESSURE: 78 MMHG | RESPIRATION RATE: 18 BRPM | WEIGHT: 206.81 LBS | TEMPERATURE: 98 F | BODY MASS INDEX: 29.61 KG/M2

## 2019-07-18 DIAGNOSIS — D49.89 NEOPLASM OF ABDOMEN: ICD-10-CM

## 2019-07-18 DIAGNOSIS — I81 PORTAL VEIN THROMBOSIS: Primary | ICD-10-CM

## 2019-07-18 DIAGNOSIS — R79.1 ABNORMAL COAGULATION PROFILE: ICD-10-CM

## 2019-07-18 DIAGNOSIS — R16.0 LIVER MASS, LEFT LOBE: ICD-10-CM

## 2019-07-18 DIAGNOSIS — I81 PORTAL VEIN THROMBOSIS: ICD-10-CM

## 2019-07-18 LAB — APTT BLDCRRT: 35 SEC (ref 21–32)

## 2019-07-18 PROCEDURE — 99999 PR PBB SHADOW E&M-EST. PATIENT-LVL III: ICD-10-PCS | Mod: PBBFAC,,, | Performed by: INTERNAL MEDICINE

## 2019-07-18 PROCEDURE — 85730 THROMBOPLASTIN TIME PARTIAL: CPT

## 2019-07-18 PROCEDURE — 99215 OFFICE O/P EST HI 40 MIN: CPT | Mod: S$PBB,,, | Performed by: INTERNAL MEDICINE

## 2019-07-18 PROCEDURE — 99215 PR OFFICE/OUTPT VISIT, EST, LEVL V, 40-54 MIN: ICD-10-PCS | Mod: S$PBB,,, | Performed by: INTERNAL MEDICINE

## 2019-07-18 PROCEDURE — 99999 PR PBB SHADOW E&M-EST. PATIENT-LVL III: CPT | Mod: PBBFAC,,, | Performed by: INTERNAL MEDICINE

## 2019-07-18 PROCEDURE — 99213 OFFICE O/P EST LOW 20 MIN: CPT | Mod: PBBFAC | Performed by: INTERNAL MEDICINE

## 2019-07-18 PROCEDURE — 36415 COLL VENOUS BLD VENIPUNCTURE: CPT

## 2019-07-18 RX ORDER — CHOLECALCIFEROL (VITAMIN D3) 25 MCG
1000 TABLET ORAL
COMMUNITY

## 2019-07-18 RX ORDER — ONDANSETRON 4 MG/1
TABLET, ORALLY DISINTEGRATING ORAL
COMMUNITY
Start: 2019-07-03 | End: 2019-10-04 | Stop reason: SDUPTHER

## 2019-07-18 RX ORDER — SUCRALFATE 1 G/1
1 TABLET ORAL
COMMUNITY
End: 2019-07-18

## 2019-07-18 NOTE — PROGRESS NOTES
Subjective:       Patient ID: Ottoniel Arellano Jr. is a 78 y.o. male.    Chief Complaint: Hospital Follow Up and Results    HPI 78-year-old male with spontaneous portal vein thrombosis patient was found have a left lobe mass patient presents for further discussion an options after discharge from hospital on Eliquis 10 mg p.o. b.i.d. converted to 5 mg p.o. b.i.d. ECOG status 1    Past Medical History:   Diagnosis Date    Arthritis     Coronary artery disease     Hx of heart artery stent      History reviewed. No pertinent family history.  Social History     Socioeconomic History    Marital status:      Spouse name: Not on file    Number of children: Not on file    Years of education: Not on file    Highest education level: Not on file   Occupational History    Not on file   Social Needs    Financial resource strain: Not on file    Food insecurity:     Worry: Not on file     Inability: Not on file    Transportation needs:     Medical: Not on file     Non-medical: Not on file   Tobacco Use    Smoking status: Former Smoker    Smokeless tobacco: Former User     Quit date: 5/18/1986   Substance and Sexual Activity    Alcohol use: No    Drug use: Not on file    Sexual activity: Not on file   Lifestyle    Physical activity:     Days per week: Not on file     Minutes per session: Not on file    Stress: Not on file   Relationships    Social connections:     Talks on phone: Not on file     Gets together: Not on file     Attends Mosque service: Not on file     Active member of club or organization: Not on file     Attends meetings of clubs or organizations: Not on file     Relationship status: Not on file   Other Topics Concern    Not on file   Social History Narrative    Not on file     Past Surgical History:   Procedure Laterality Date    CARDIAC SURGERY      stents 12/29/2012    EYE SURGERY      right, skin graft    INJECTION-STEROID-EPIDURAL-CAUDAL N/A 5/20/2016    Performed by Francis MCCARTNEY  Manuel Martin [facility] at [facility] OR    SPINAL FUSION      ACDF 05/04/2010    TONSILLECTOMY         Labs:  Lab Results   Component Value Date    WBC 11.45 07/12/2019    WBC 11.45 07/12/2019    HGB 12.8 (L) 07/12/2019    HGB 12.8 (L) 07/12/2019    HCT 38.9 (L) 07/12/2019    HCT 38.9 (L) 07/12/2019    MCV 94 07/12/2019    MCV 94 07/12/2019     07/12/2019     07/12/2019     BMP  Lab Results   Component Value Date     07/12/2019    K 4.4 07/12/2019     07/12/2019    CO2 25 07/12/2019    BUN 11 07/12/2019    CREATININE 0.9 07/12/2019    CALCIUM 9.4 07/12/2019    ANIONGAP 10 07/12/2019    ESTGFRAFRICA >60 07/12/2019    EGFRNONAA >60 07/12/2019     Lab Results   Component Value Date    ALT 30 07/12/2019    AST 26 07/12/2019    ALKPHOS 93 07/12/2019    BILITOT 1.0 07/12/2019       No results found for: IRON, TIBC, FERRITIN, SATURATEDIRO  No results found for: RREJEBSW23  No results found for: FOLATE  No results found for: TSH      Review of Systems   Constitutional: Negative for activity change, appetite change, chills, diaphoresis, fatigue, fever and unexpected weight change.   HENT: Negative for congestion, dental problem, drooling, ear discharge, ear pain, facial swelling, hearing loss, mouth sores, nosebleeds, postnasal drip, rhinorrhea, sinus pressure, sneezing, sore throat, tinnitus, trouble swallowing and voice change.    Eyes: Negative for photophobia, pain, discharge, redness, itching and visual disturbance.   Respiratory: Negative for apnea, cough, choking, chest tightness, shortness of breath, wheezing and stridor.    Cardiovascular: Negative for chest pain, palpitations and leg swelling.   Gastrointestinal: Positive for abdominal pain. Negative for abdominal distention, anal bleeding, blood in stool, constipation, diarrhea, nausea, rectal pain and vomiting.   Endocrine: Negative for cold intolerance, heat intolerance, polydipsia, polyphagia and polyuria.   Genitourinary: Negative for decreased  urine volume, difficulty urinating, discharge, dysuria, enuresis, flank pain, frequency, genital sores, hematuria, penile pain, penile swelling, scrotal swelling, testicular pain and urgency.   Musculoskeletal: Negative for arthralgias, back pain, gait problem, joint swelling, myalgias, neck pain and neck stiffness.   Skin: Negative for color change, pallor, rash and wound.   Allergic/Immunologic: Negative for environmental allergies, food allergies and immunocompromised state.   Neurological: Negative for dizziness, tremors, seizures, syncope, facial asymmetry, speech difficulty, weakness, light-headedness, numbness and headaches.   Hematological: Negative for adenopathy. Does not bruise/bleed easily.   Psychiatric/Behavioral: Positive for dysphoric mood. Negative for agitation, behavioral problems, confusion, decreased concentration, hallucinations, self-injury, sleep disturbance and suicidal ideas. The patient is nervous/anxious. The patient is not hyperactive.        Objective:      Physical Exam   Constitutional: He is oriented to person, place, and time. He appears well-developed and well-nourished. He appears distressed.   HENT:   Head: Normocephalic.   Right Ear: External ear normal.   Left Ear: External ear normal.   Nose: Nose normal. Right sinus exhibits no maxillary sinus tenderness and no frontal sinus tenderness. Left sinus exhibits no maxillary sinus tenderness and no frontal sinus tenderness.   Mouth/Throat: Oropharynx is clear and moist. No oropharyngeal exudate.   Eyes: Pupils are equal, round, and reactive to light. EOM and lids are normal. Right eye exhibits no discharge. Left eye exhibits no discharge. Right conjunctiva is not injected. Right conjunctiva has no hemorrhage. Left conjunctiva is not injected. Left conjunctiva has no hemorrhage. No scleral icterus. Right eye exhibits normal extraocular motion. Left eye exhibits normal extraocular motion.   Neck: Normal range of motion. Neck supple.  No JVD present. No tracheal deviation present. No thyromegaly present.   Cardiovascular: Normal rate, regular rhythm and normal heart sounds.   Pulmonary/Chest: Effort normal and breath sounds normal. No stridor. No respiratory distress.   Abdominal: Soft. Bowel sounds are normal. He exhibits no mass. There is no hepatosplenomegaly, splenomegaly or hepatomegaly. There is tenderness.   Musculoskeletal: Normal range of motion. He exhibits no edema or tenderness.   Lymphadenopathy:        Head (right side): No posterior auricular and no occipital adenopathy present.        Head (left side): No posterior auricular and no occipital adenopathy present.     He has no cervical adenopathy.        Right cervical: No superficial cervical, no deep cervical and no posterior cervical adenopathy present.       Left cervical: No superficial cervical, no deep cervical and no posterior cervical adenopathy present.     He has no axillary adenopathy.        Right: No supraclavicular adenopathy present.        Left: No supraclavicular adenopathy present.   Neurological: He is alert and oriented to person, place, and time. He has normal strength. No cranial nerve deficit. Coordination normal.   Skin: Skin is dry. No rash noted. He is not diaphoretic. No cyanosis or erythema. Nails show no clubbing.   Psychiatric: He has a normal mood and affect. His behavior is normal. Judgment and thought content normal. Cognition and memory are normal.   Vitals reviewed.          Assessment:      1. Portal vein thrombosis    2. Neoplasm of abdomen    3. Liver mass, left lobe    4. Abnormal coagulation profile            Plan:       Extensive conversation with patient as well as wife son from [place] brought in by phone reviewed information with them at this point concerned about primary hepatocellular carcinoma with left lobe mass.  Establish patient on my [facility] in patient portal patient will have CT chest as well as MRI of abdomen done.  Elevated  alpha fetoprotein noted follow-up with me after CT chest and abdomen done continue on current dose of Eliquis.  Will headache need to stop Eliquis 48 hr prior to liver biopsy and weight 48 hr afterwards.  Will discuss fact that PTT is elevated will repeat PTT today and check for mixing studies elevated.  Discussed implications with family 40 min face-to-face time    Jarod Kevin Jr, MD FACP

## 2019-07-18 NOTE — TELEPHONE ENCOUNTER
Spoke with patient, scheduled sooner MRI for next Tuesday 7/23 in [place]. Confirmed CT scan and MRI appointment time and location.

## 2019-07-19 ENCOUNTER — TELEPHONE (OUTPATIENT)
Dept: HEMATOLOGY/ONCOLOGY | Facility: CLINIC | Age: 79
End: 2019-07-19

## 2019-07-19 NOTE — TELEPHONE ENCOUNTER
----- Message from Jarod Kevin MD sent at 7/19/2019 12:26 PM CDT -----  Thank you very much would like to stop the Eliquis 48 hr prior  ----- Message -----  From: Taryn Khan LPN  Sent: 7/19/2019  12:16 PM  To: Ar Ham MD, Jarod Kevin MD, #    Good afternoon!       This patient needs to be scheduled for a liver biopsy per Dr. Kevin after 7/23/19 (pt is having MRI and CT done).     Patient is currently on Eliquis.       Please let me know when this can be scheduled and I will let the patient know.     Thanks!    -Meli [phone].

## 2019-07-19 NOTE — TELEPHONE ENCOUNTER
Spoke with patient let him know that we scheduled biopsy for 7/24 at 11:00AM. Went over instructions for stopping Eliquis 48 hours before procedure and nothing to eat/ drink after midnight and must have someone with you for the procedure.

## 2019-07-23 ENCOUNTER — HOSPITAL ENCOUNTER (OUTPATIENT)
Dept: RADIOLOGY | Facility: HOSPITAL | Age: 79
Discharge: HOME | End: 2019-07-23
Attending: INTERNAL MEDICINE
Payer: MEDICARE

## 2019-07-23 ENCOUNTER — OFFICE VISIT (OUTPATIENT)
Dept: HEMATOLOGY/ONCOLOGY | Facility: CLINIC | Age: 79
End: 2019-07-23
Payer: MEDICARE

## 2019-07-23 ENCOUNTER — TELEPHONE (OUTPATIENT)
Dept: HEMATOLOGY/ONCOLOGY | Facility: CLINIC | Age: 79
End: 2019-07-23

## 2019-07-23 ENCOUNTER — TELEPHONE (OUTPATIENT)
Dept: RADIOLOGY | Facility: HOSPITAL | Age: 79
End: 2019-07-23

## 2019-07-23 VITALS
HEIGHT: 70 IN | HEART RATE: 77 BPM | DIASTOLIC BLOOD PRESSURE: 83 MMHG | BODY MASS INDEX: 29.73 KG/M2 | TEMPERATURE: 97 F | OXYGEN SATURATION: 98 % | WEIGHT: 207.69 LBS | SYSTOLIC BLOOD PRESSURE: 161 MMHG

## 2019-07-23 DIAGNOSIS — I81 PORTAL VEIN THROMBOSIS: Primary | ICD-10-CM

## 2019-07-23 DIAGNOSIS — I81 PORTAL VEIN THROMBOSIS: ICD-10-CM

## 2019-07-23 DIAGNOSIS — R16.0 LIVER MASS, LEFT LOBE: ICD-10-CM

## 2019-07-23 DIAGNOSIS — R16.0 LIVER MASS: ICD-10-CM

## 2019-07-23 DIAGNOSIS — R16.0 LIVER MASS, LEFT LOBE: Primary | ICD-10-CM

## 2019-07-23 DIAGNOSIS — R10.10 PAIN OF UPPER ABDOMEN: ICD-10-CM

## 2019-07-23 DIAGNOSIS — D49.89 NEOPLASM OF ABDOMEN: ICD-10-CM

## 2019-07-23 PROCEDURE — 71260 CT CHEST WITH CONTRAST: ICD-10-PCS | Mod: 26,,, | Performed by: RADIOLOGY

## 2019-07-23 PROCEDURE — 99214 PR OFFICE/OUTPT VISIT, EST, LEVL IV, 30-39 MIN: ICD-10-PCS | Mod: S$PBB,,, | Performed by: INTERNAL MEDICINE

## 2019-07-23 PROCEDURE — 71260 CT THORAX DX C+: CPT | Mod: 26,,, | Performed by: RADIOLOGY

## 2019-07-23 PROCEDURE — 99213 OFFICE O/P EST LOW 20 MIN: CPT | Mod: PBBFAC,25 | Performed by: INTERNAL MEDICINE

## 2019-07-23 PROCEDURE — 74183 MRI ABD W/O CNTR FLWD CNTR: CPT | Mod: TC,PO

## 2019-07-23 PROCEDURE — 99999 PR PBB SHADOW E&M-EST. PATIENT-LVL III: ICD-10-PCS | Mod: PBBFAC,,, | Performed by: INTERNAL MEDICINE

## 2019-07-23 PROCEDURE — 99999 PR PBB SHADOW E&M-EST. PATIENT-LVL III: CPT | Mod: PBBFAC,,, | Performed by: INTERNAL MEDICINE

## 2019-07-23 PROCEDURE — 74183 MRI ABD W/O CNTR FLWD CNTR: CPT | Mod: 26,,, | Performed by: RADIOLOGY

## 2019-07-23 PROCEDURE — A9585 GADOBUTROL INJECTION: HCPCS | Mod: PO | Performed by: INTERNAL MEDICINE

## 2019-07-23 PROCEDURE — 25500020 PHARM REV CODE 255: Mod: PO | Performed by: INTERNAL MEDICINE

## 2019-07-23 PROCEDURE — 99214 OFFICE O/P EST MOD 30 MIN: CPT | Mod: S$PBB,,, | Performed by: INTERNAL MEDICINE

## 2019-07-23 PROCEDURE — 71260 CT THORAX DX C+: CPT | Mod: TC,PO

## 2019-07-23 PROCEDURE — 74183 MRI ABDOMEN W WO CONTRAST: ICD-10-PCS | Mod: 26,,, | Performed by: RADIOLOGY

## 2019-07-23 RX ORDER — CLONAZEPAM 1 MG/1
1 TABLET ORAL DAILY
Qty: 1 TABLET | Refills: 0 | Status: SHIPPED | OUTPATIENT
Start: 2019-07-23 | End: 2019-08-14 | Stop reason: SDUPTHER

## 2019-07-23 RX ORDER — GADOBUTROL 604.72 MG/ML
9 INJECTION INTRAVENOUS
Status: COMPLETED | OUTPATIENT
Start: 2019-07-23 | End: 2019-07-23

## 2019-07-23 RX ORDER — CLONAZEPAM 1 MG/1
1 TABLET ORAL DAILY
Qty: 1 TABLET | Refills: 0 | Status: SHIPPED | OUTPATIENT
Start: 2019-07-23 | End: 2019-07-23 | Stop reason: SDUPTHER

## 2019-07-23 RX ADMIN — IOHEXOL 75 ML: 350 INJECTION, SOLUTION INTRAVENOUS at 08:07

## 2019-07-23 RX ADMIN — GADOBUTROL 9 ML: 604.72 INJECTION INTRAVENOUS at 11:07

## 2019-07-23 NOTE — PROGRESS NOTES
Subjective:       Patient ID: Ottoniel Arellano Jr. is a 78 y.o. male.    Chief Complaint: Coagulation Disorder and Results    HPI 78-year-old male returns after results of MRI of abdomen and CT chest done today for discussion of treatment options ECOG status 1    Past Medical History:   Diagnosis Date    Arthritis     Coronary artery disease     Hx of heart artery stent      History reviewed. No pertinent family history.  Social History     Socioeconomic History    Marital status:      Spouse name: Not on file    Number of children: Not on file    Years of education: Not on file    Highest education level: Not on file   Occupational History    Not on file   Social Needs    Financial resource strain: Not on file    Food insecurity:     Worry: Not on file     Inability: Not on file    Transportation needs:     Medical: Not on file     Non-medical: Not on file   Tobacco Use    Smoking status: Former Smoker    Smokeless tobacco: Former User     Quit date: 5/18/1986   Substance and Sexual Activity    Alcohol use: No    Drug use: Not on file    Sexual activity: Not on file   Lifestyle    Physical activity:     Days per week: Not on file     Minutes per session: Not on file    Stress: Not on file   Relationships    Social connections:     Talks on phone: Not on file     Gets together: Not on file     Attends Jewish service: Not on file     Active member of club or organization: Not on file     Attends meetings of clubs or organizations: Not on file     Relationship status: Not on file   Other Topics Concern    Not on file   Social History Narrative    Not on file     Past Surgical History:   Procedure Laterality Date    CARDIAC SURGERY      stents 12/29/2012    EYE SURGERY      right, skin graft    INJECTION-STEROID-EPIDURAL-CAUDAL N/A 5/20/2016    Performed by Francis [facility] Jr[facility] at [facility]    SPINAL FUSION      ACDF 05/04/2010    TONSILLECTOMY         Labs:  Lab Results   Component  Value Date    WBC 11.45 07/12/2019    WBC 11.45 07/12/2019    HGB 12.8 (L) 07/12/2019    HGB 12.8 (L) 07/12/2019    HCT 38.9 (L) 07/12/2019    HCT 38.9 (L) 07/12/2019    MCV 94 07/12/2019    MCV 94 07/12/2019     07/12/2019     07/12/2019     BMP  Lab Results   Component Value Date     07/12/2019    K 4.4 07/12/2019     07/12/2019    CO2 25 07/12/2019    BUN 11 07/12/2019    CREATININE 0.9 07/12/2019    CALCIUM 9.4 07/12/2019    ANIONGAP 10 07/12/2019    ESTGFRAFRICA >60 07/12/2019    EGFRNONAA >60 07/12/2019     Lab Results   Component Value Date    ALT 30 07/12/2019    AST 26 07/12/2019    ALKPHOS 93 07/12/2019    BILITOT 1.0 07/12/2019       No results found for: IRON, TIBC, FERRITIN, SATURATEDIRO  No results found for: [id number]  No results found for: FOLATE  No results found for: TSH      Review of Systems   Constitutional: Negative for activity change, appetite change, chills, diaphoresis, fatigue, fever and unexpected weight change.   HENT: Negative for congestion, dental problem, drooling, ear discharge, ear pain, facial swelling, hearing loss, mouth sores, nosebleeds, postnasal drip, rhinorrhea, sinus pressure, sneezing, sore throat, tinnitus, trouble swallowing and voice change.    Eyes: Negative for photophobia, pain, discharge, redness, itching and visual disturbance.   Respiratory: Negative for apnea, cough, choking, chest tightness, shortness of breath, wheezing and stridor.    Cardiovascular: Negative for chest pain, palpitations and leg swelling.   Gastrointestinal: Positive for abdominal distention and abdominal pain. Negative for anal bleeding, blood in stool, constipation, diarrhea, nausea, rectal pain and vomiting.   Endocrine: Negative for cold intolerance, heat intolerance, polydipsia, polyphagia and polyuria.   Genitourinary: Negative for decreased urine volume, difficulty urinating, discharge, dysuria, enuresis, flank pain, frequency, genital sores, hematuria,  penile pain, penile swelling, scrotal swelling, testicular pain and urgency.   Musculoskeletal: Negative for arthralgias, back pain, gait problem, joint swelling, myalgias, neck pain and neck stiffness.   Skin: Negative for color change, pallor, rash and wound.   Allergic/Immunologic: Negative for environmental allergies, food allergies and immunocompromised state.   Neurological: Negative for dizziness, tremors, seizures, syncope, facial asymmetry, speech difficulty, weakness, light-headedness, numbness and headaches.   Hematological: Negative for adenopathy. Does not bruise/bleed easily.   Psychiatric/Behavioral: Positive for dysphoric mood. Negative for agitation, behavioral problems, confusion, decreased concentration, hallucinations, self-injury, sleep disturbance and suicidal ideas. The patient is nervous/anxious. The patient is not hyperactive.        Objective:      Physical Exam   Constitutional: He is oriented to person, place, and time. He appears well-developed and well-nourished. He appears distressed.   HENT:   Head: Normocephalic.   Right Ear: External ear normal.   Left Ear: External ear normal.   Nose: Nose normal. Right sinus exhibits no maxillary sinus tenderness and no frontal sinus tenderness. Left sinus exhibits no maxillary sinus tenderness and no frontal sinus tenderness.   Mouth/Throat: Oropharynx is clear and moist. No oropharyngeal exudate.   Eyes: Pupils are equal, round, and reactive to light. EOM and lids are normal. Right eye exhibits no discharge. Left eye exhibits no discharge. Right conjunctiva is not injected. Right conjunctiva has no hemorrhage. Left conjunctiva is not injected. Left conjunctiva has no hemorrhage. No scleral icterus. Right eye exhibits normal extraocular motion. Left eye exhibits normal extraocular motion.   Neck: Normal range of motion. Neck supple. No JVD present. No tracheal deviation present. No thyromegaly present.   Cardiovascular: Normal rate and regular  rhythm.   Pulmonary/Chest: Effort normal. No stridor. No respiratory distress.   Abdominal: Soft. He exhibits no mass. There is no hepatosplenomegaly, splenomegaly or hepatomegaly. There is no tenderness.   Musculoskeletal: Normal range of motion. He exhibits no edema or tenderness.   Lymphadenopathy:        Head (right side): No posterior auricular and no occipital adenopathy present.        Head (left side): No posterior auricular and no occipital adenopathy present.     He has no cervical adenopathy.        Right cervical: No superficial cervical, no deep cervical and no posterior cervical adenopathy present.       Left cervical: No superficial cervical, no deep cervical and no posterior cervical adenopathy present.     He has no axillary adenopathy.        Right: No supraclavicular adenopathy present.        Left: No supraclavicular adenopathy present.   Neurological: He is alert and oriented to person, place, and time. He has normal strength. No cranial nerve deficit. Coordination normal.   Skin: Skin is dry. No rash noted. He is not diaphoretic. No cyanosis or erythema. Nails show no clubbing.   Psychiatric: He has a normal mood and affect. His behavior is normal. Judgment and thought content normal. Cognition and memory are normal.   Vitals reviewed.          Assessment:      1. Portal vein thrombosis    2. Liver mass    3. Pain of upper abdomen           Plan:     Results of CT chest demonstrate no evidence of pulmonary metastasis.  Indeterminate pulmonary nodules noted no lung primary.  MRI verbal report demonstrates persistent mass in left lobe is scheduled for CT-directed biopsy is been off of Eliquis for 48 hr and told to resume Eliquis 48 hr post biopsy will see back next week for results discussed implications with him        Jarod Kevin Jr, MD FACP

## 2019-07-23 NOTE — TELEPHONE ENCOUNTER
[facility] called and patient is there today to get MRI done and needs something for anxiety. Has taken klonopin before. Would like to know if Dr. Kevin can send prescription to [facility] in [place] so he can complete MRI today.

## 2019-07-23 NOTE — H&P (VIEW-ONLY)
Subjective:       Patient ID: Ottoniel Arellano Jr. is a 78 y.o. male.    Chief Complaint: Coagulation Disorder and Results    HPI 78-year-old male returns after results of MRI of abdomen and CT chest done today for discussion of treatment options ECOG status 1    Past Medical History:   Diagnosis Date    Arthritis     Coronary artery disease     Hx of heart artery stent      History reviewed. No pertinent family history.  Social History     Socioeconomic History    Marital status:      Spouse name: Not on file    Number of children: Not on file    Years of education: Not on file    Highest education level: Not on file   Occupational History    Not on file   Social Needs    Financial resource strain: Not on file    Food insecurity:     Worry: Not on file     Inability: Not on file    Transportation needs:     Medical: Not on file     Non-medical: Not on file   Tobacco Use    Smoking status: Former Smoker    Smokeless tobacco: Former User     Quit date: 5/18/1986   Substance and Sexual Activity    Alcohol use: No    Drug use: Not on file    Sexual activity: Not on file   Lifestyle    Physical activity:     Days per week: Not on file     Minutes per session: Not on file    Stress: Not on file   Relationships    Social connections:     Talks on phone: Not on file     Gets together: Not on file     Attends [employer] service: Not on file     Active member of club or organization: Not on file     Attends meetings of clubs or organizations: Not on file     Relationship status: Not on file   Other Topics Concern    Not on file   Social History Narrative    Not on file     Past Surgical History:   Procedure Laterality Date    CARDIAC SURGERY      stents 12/29/2012    EYE SURGERY      right, skin graft    INJECTION-STEROID-EPIDURAL-CAUDAL N/A 5/20/2016    Performed by [facility] at [facility] OR    SPINAL FUSION      ACDF 05/04/2010    TONSILLECTOMY         Labs:  Lab Results   Component  Value Date    WBC 11.45 07/12/2019    WBC 11.45 07/12/2019    HGB 12.8 (L) 07/12/2019    HGB 12.8 (L) 07/12/2019    HCT 38.9 (L) 07/12/2019    HCT 38.9 (L) 07/12/2019    MCV 94 07/12/2019    MCV 94 07/12/2019     07/12/2019     07/12/2019     BMP  Lab Results   Component Value Date     07/12/2019    K 4.4 07/12/2019     07/12/2019    CO2 25 07/12/2019    BUN 11 07/12/2019    CREATININE 0.9 07/12/2019    CALCIUM 9.4 07/12/2019    ANIONGAP 10 07/12/2019    ESTGFRAFRICA >60 07/12/2019    EGFRNONAA >60 07/12/2019     Lab Results   Component Value Date    ALT 30 07/12/2019    AST 26 07/12/2019    ALKPHOS 93 07/12/2019    BILITOT 1.0 07/12/2019       No results found for: IRON, TIBC, FERRITIN, SATURATEDIRO  No results found for: [id number]  No results found for: FOLATE  No results found for: TSH      Review of Systems   Constitutional: Negative for activity change, appetite change, chills, diaphoresis, fatigue, fever and unexpected weight change.   HENT: Negative for congestion, dental problem, drooling, ear discharge, ear pain, facial swelling, hearing loss, mouth sores, nosebleeds, postnasal drip, rhinorrhea, sinus pressure, sneezing, sore throat, tinnitus, trouble swallowing and voice change.    Eyes: Negative for photophobia, pain, discharge, redness, itching and visual disturbance.   Respiratory: Negative for apnea, cough, choking, chest tightness, shortness of breath, wheezing and stridor.    Cardiovascular: Negative for chest pain, palpitations and leg swelling.   Gastrointestinal: Positive for abdominal distention and abdominal pain. Negative for anal bleeding, blood in stool, constipation, diarrhea, nausea, rectal pain and vomiting.   Endocrine: Negative for cold intolerance, heat intolerance, polydipsia, polyphagia and polyuria.   Genitourinary: Negative for decreased urine volume, difficulty urinating, discharge, dysuria, enuresis, flank pain, frequency, genital sores, hematuria,  penile pain, penile swelling, scrotal swelling, testicular pain and urgency.   Musculoskeletal: Negative for arthralgias, back pain, gait problem, joint swelling, myalgias, neck pain and neck stiffness.   Skin: Negative for color change, pallor, rash and wound.   Allergic/Immunologic: Negative for environmental allergies, food allergies and immunocompromised state.   Neurological: Negative for dizziness, tremors, seizures, syncope, facial asymmetry, speech difficulty, weakness, light-headedness, numbness and headaches.   Hematological: Negative for adenopathy. Does not bruise/bleed easily.   Psychiatric/Behavioral: Positive for dysphoric mood. Negative for agitation, behavioral problems, confusion, decreased concentration, hallucinations, self-injury, sleep disturbance and suicidal ideas. The patient is nervous/anxious. The patient is not hyperactive.        Objective:      Physical Exam   Constitutional: He is oriented to person, place, and time. He appears well-developed and well-nourished. He appears distressed.   HENT:   Head: Normocephalic.   Right Ear: External ear normal.   Left Ear: External ear normal.   Nose: Nose normal. Right sinus exhibits no maxillary sinus tenderness and no frontal sinus tenderness. Left sinus exhibits no maxillary sinus tenderness and no frontal sinus tenderness.   Mouth/Throat: Oropharynx is clear and moist. No oropharyngeal exudate.   Eyes: Pupils are equal, round, and reactive to light. EOM and lids are normal. Right eye exhibits no discharge. Left eye exhibits no discharge. Right conjunctiva is not injected. Right conjunctiva has no hemorrhage. Left conjunctiva is not injected. Left conjunctiva has no hemorrhage. No scleral icterus. Right eye exhibits normal extraocular motion. Left eye exhibits normal extraocular motion.   Neck: Normal range of motion. Neck supple. No JVD present. No tracheal deviation present. No thyromegaly present.   Cardiovascular: Normal rate and regular  rhythm.   Pulmonary/Chest: Effort normal. No stridor. No respiratory distress.   Abdominal: Soft. He exhibits no mass. There is no hepatosplenomegaly, splenomegaly or hepatomegaly. There is no tenderness.   Musculoskeletal: Normal range of motion. He exhibits no edema or tenderness.   Lymphadenopathy:        Head (right side): No posterior auricular and no occipital adenopathy present.        Head (left side): No posterior auricular and no occipital adenopathy present.     He has no cervical adenopathy.        Right cervical: No superficial cervical, no deep cervical and no posterior cervical adenopathy present.       Left cervical: No superficial cervical, no deep cervical and no posterior cervical adenopathy present.     He has no axillary adenopathy.        Right: No supraclavicular adenopathy present.        Left: No supraclavicular adenopathy present.   Neurological: He is alert and oriented to person, place, and time. He has normal strength. No cranial nerve deficit. Coordination normal.   Skin: Skin is dry. No rash noted. He is not diaphoretic. No cyanosis or erythema. Nails show no clubbing.   Psychiatric: He has a normal mood and affect. His behavior is normal. Judgment and thought content normal. Cognition and memory are normal.   Vitals reviewed.          Assessment:      1. Portal vein thrombosis    2. Liver mass    3. Pain of upper abdomen           Plan:     Results of CT chest demonstrate no evidence of pulmonary metastasis.  Indeterminate pulmonary nodules noted no lung primary.  MRI verbal report demonstrates persistent mass in left lobe is scheduled for CT-directed biopsy is been off of Eliquis for 48 hr and told to resume Eliquis 48 hr post biopsy will see back next week for results discussed implications with him        Jarod Kevin Jr, MD FACP

## 2019-07-24 ENCOUNTER — HOSPITAL ENCOUNTER (OUTPATIENT)
Dept: RADIOLOGY | Facility: HOSPITAL | Age: 79
Discharge: HOME | End: 2019-07-24
Attending: INTERNAL MEDICINE
Payer: MEDICARE

## 2019-07-24 VITALS
TEMPERATURE: 98 F | DIASTOLIC BLOOD PRESSURE: 74 MMHG | BODY MASS INDEX: 28.63 KG/M2 | WEIGHT: 200 LBS | RESPIRATION RATE: 16 BRPM | HEIGHT: 70 IN | HEART RATE: 87 BPM | SYSTOLIC BLOOD PRESSURE: 158 MMHG | OXYGEN SATURATION: 98 %

## 2019-07-24 DIAGNOSIS — R16.0 LIVER MASS, LEFT LOBE: ICD-10-CM

## 2019-07-24 PROCEDURE — 88313 TISSUE SPECIMEN TO PATHOLOGY, RADIOLOGY: ICD-10-PCS | Mod: 26,,, | Performed by: PATHOLOGY

## 2019-07-24 PROCEDURE — 88313 SPECIAL STAINS GROUP 2: CPT | Mod: 26,,, | Performed by: PATHOLOGY

## 2019-07-24 PROCEDURE — 63600175 PHARM REV CODE 636 W HCPCS: Performed by: RADIOLOGY

## 2019-07-24 PROCEDURE — 77012 CT SCAN FOR NEEDLE BIOPSY: CPT | Mod: TC

## 2019-07-24 PROCEDURE — 88307 TISSUE EXAM BY PATHOLOGIST: CPT | Performed by: PATHOLOGY

## 2019-07-24 PROCEDURE — 88307 TISSUE EXAM BY PATHOLOGIST: CPT | Mod: 26,,, | Performed by: PATHOLOGY

## 2019-07-24 PROCEDURE — 88307 TISSUE SPECIMEN TO PATHOLOGY, RADIOLOGY: ICD-10-PCS | Mod: 26,,, | Performed by: PATHOLOGY

## 2019-07-24 RX ORDER — MIDAZOLAM HYDROCHLORIDE 1 MG/ML
INJECTION INTRAMUSCULAR; INTRAVENOUS CODE/TRAUMA/SEDATION MEDICATION
Status: COMPLETED | OUTPATIENT
Start: 2019-07-24 | End: 2019-07-24

## 2019-07-24 RX ORDER — FENTANYL CITRATE 50 UG/ML
INJECTION, SOLUTION INTRAMUSCULAR; INTRAVENOUS CODE/TRAUMA/SEDATION MEDICATION
Status: COMPLETED | OUTPATIENT
Start: 2019-07-24 | End: 2019-07-24

## 2019-07-24 RX ADMIN — FENTANYL CITRATE 50 MCG: 50 INJECTION, SOLUTION INTRAMUSCULAR; INTRAVENOUS at 10:07

## 2019-07-24 RX ADMIN — MIDAZOLAM HYDROCHLORIDE 1 MG: 1 INJECTION, SOLUTION INTRAMUSCULAR; INTRAVENOUS at 10:07

## 2019-07-24 NOTE — PLAN OF CARE
Pt d/c home in stable condition via wheelchair with spouse.  Verbalized understanding of d/c instructions.  Pt voiced no complaints at this time.

## 2019-07-24 NOTE — SEDATION DOCUMENTATION
Procedure complete, pt tolerated well.  Vss.  Band aid placed to R abdomen puncture site, site WDL.  Pt awake and alert able to follow simple commands. [facility] to radiology recovery supine with hob elevated.  Pt denied pain or discomfort at this time.

## 2019-07-24 NOTE — DISCHARGE SUMMARY
Pre Op Diagnosis: Liver mass     Post Op Diagnosis: same     Procedure:  Liver mass biopsy     Procedure performed by: Jitendra TILLMAN, Sravani COTTO     Written Informed Consent Obtained: Yes     Specimen Removed:  yes     Estimated Blood Loss:  minimal     Findings: Local anesthesia and moderate sedation were used.     The patient tolerated the procedure well and there were no complications.      Sterile technique was performed in the RUQ, lidocaine was used as a local anesthetic.  Multiple samples taken from the liver mass.  Pt tolerated the procedure well without immediate complications.  Please see radiologist report for details. F/u with PCP and/or ordering physician.

## 2019-07-24 NOTE — SEDATION DOCUMENTATION
Pt in ct on table supine with bilateral arms above head, vs monitor in place, vss.  Pt verbalized understanding of procedure.

## 2019-07-29 ENCOUNTER — OFFICE VISIT (OUTPATIENT)
Dept: HEMATOLOGY/ONCOLOGY | Facility: CLINIC | Age: 79
End: 2019-07-29
Payer: MEDICARE

## 2019-07-29 VITALS
BODY MASS INDEX: 29.35 KG/M2 | SYSTOLIC BLOOD PRESSURE: 126 MMHG | OXYGEN SATURATION: 97 % | HEART RATE: 98 BPM | WEIGHT: 205 LBS | DIASTOLIC BLOOD PRESSURE: 74 MMHG | HEIGHT: 70 IN | TEMPERATURE: 99 F

## 2019-07-29 DIAGNOSIS — I81 PORTAL VEIN THROMBOSIS: Primary | ICD-10-CM

## 2019-07-29 DIAGNOSIS — D49.89 NEOPLASM OF ABDOMEN: ICD-10-CM

## 2019-07-29 PROCEDURE — 99214 OFFICE O/P EST MOD 30 MIN: CPT | Mod: S$PBB,,, | Performed by: INTERNAL MEDICINE

## 2019-07-29 PROCEDURE — 99214 PR OFFICE/OUTPT VISIT, EST, LEVL IV, 30-39 MIN: ICD-10-PCS | Mod: S$PBB,,, | Performed by: INTERNAL MEDICINE

## 2019-07-29 PROCEDURE — 99999 PR PBB SHADOW E&M-EST. PATIENT-LVL III: CPT | Mod: PBBFAC,,, | Performed by: INTERNAL MEDICINE

## 2019-07-29 PROCEDURE — 99999 PR PBB SHADOW E&M-EST. PATIENT-LVL III: ICD-10-PCS | Mod: PBBFAC,,, | Performed by: INTERNAL MEDICINE

## 2019-07-29 PROCEDURE — 99213 OFFICE O/P EST LOW 20 MIN: CPT | Mod: PBBFAC | Performed by: INTERNAL MEDICINE

## 2019-07-29 RX ORDER — ENOXAPARIN SODIUM 100 MG/ML
1 INJECTION SUBCUTANEOUS 2 TIMES DAILY
Qty: 10 SYRINGE | Refills: 11 | Status: SHIPPED | OUTPATIENT
Start: 2019-07-29 | End: 2019-07-29 | Stop reason: SDUPTHER

## 2019-07-29 RX ORDER — DIPHENHYDRAMINE HCL 25 MG
50 CAPSULE ORAL NIGHTLY PRN
COMMUNITY

## 2019-07-29 RX ORDER — ENOXAPARIN SODIUM 100 MG/ML
1 INJECTION SUBCUTANEOUS 2 TIMES DAILY
Qty: 10 ML | Refills: 11 | Status: SHIPPED | OUTPATIENT
Start: 2019-07-29 | End: 2019-10-04

## 2019-07-29 NOTE — PROGRESS NOTES
Subjective:       Patient ID: Ottoniel Arellano Jr. is a 78 y.o. male.    Chief Complaint: Results (Portal vein thrombosis) and Coagulation Disorder    HPI 78-year-old male returns at CT-directed biopsy of liver mass demonstrated on imaging studies with recent portal vein thrombosis    Past Medical History:   Diagnosis Date    Arthritis     Coronary artery disease     Hx of heart artery stent      History reviewed. No pertinent family history.  Social History     Socioeconomic History    Marital status:      Spouse name: Not on file    Number of children: Not on file    Years of education: Not on file    Highest education level: Not on file   Occupational History    Not on file   Social Needs    Financial resource strain: Not on file    Food insecurity:     Worry: Not on file     Inability: Not on file    Transportation needs:     Medical: Not on file     Non-medical: Not on file   Tobacco Use    Smoking status: Former Smoker    Smokeless tobacco: Former User     Quit date: 5/18/1986   Substance and Sexual Activity    Alcohol use: No    Drug use: Not on file    Sexual activity: Not on file   Lifestyle    Physical activity:     Days per week: Not on file     Minutes per session: Not on file    Stress: Not on file   Relationships    Social connections:     Talks on phone: Not on file     Gets together: Not on file     Attends [employer] service: Not on file     Active member of club or organization: Not on file     Attends meetings of clubs or organizations: Not on file     Relationship status: Not on file   Other Topics Concern    Not on file   Social History Narrative    Not on file     Past Surgical History:   Procedure Laterality Date    CARDIAC SURGERY      stents 12/29/2012    EYE SURGERY      right, skin graft    INJECTION-STEROID-EPIDURAL-CAUDAL N/A 5/20/2016    Performed by Francis [facility] Jr[facility] at [facility] OR    SPINAL FUSION      ACDF 05/04/2010    TONSILLECTOMY         Labs:  Lab  Results   Component Value Date    WBC 11.45 07/12/2019    WBC 11.45 07/12/2019    HGB 12.8 (L) 07/12/2019    HGB 12.8 (L) 07/12/2019    HCT 38.9 (L) 07/12/2019    HCT 38.9 (L) 07/12/2019    MCV 94 07/12/2019    MCV 94 07/12/2019     07/12/2019     07/12/2019     BMP  Lab Results   Component Value Date     07/12/2019    K 4.4 07/12/2019     07/12/2019    CO2 25 07/12/2019    BUN 11 07/12/2019    CREATININE 0.9 07/12/2019    CALCIUM 9.4 07/12/2019    ANIONGAP 10 07/12/2019    ESTGFRAFRICA >60 07/12/2019    EGFRNONAA >60 07/12/2019     Lab Results   Component Value Date    ALT 30 07/12/2019    AST 26 07/12/2019    ALKPHOS 93 07/12/2019    BILITOT 1.0 07/12/2019       No results found for: IRON, TIBC, FERRITIN, SATURATEDIRO  No results found for: [id number]  No results found for: FOLATE  No results found for: TSH      Review of Systems   Constitutional: Negative for activity change, appetite change, chills, diaphoresis, fatigue, fever and unexpected weight change.   HENT: Negative for congestion, dental problem, drooling, ear discharge, ear pain, facial swelling, hearing loss, mouth sores, nosebleeds, postnasal drip, rhinorrhea, sinus pressure, sneezing, sore throat, tinnitus, trouble swallowing and voice change.    Eyes: Negative for photophobia, pain, discharge, redness, itching and visual disturbance.   Respiratory: Negative for apnea, cough, choking, chest tightness, shortness of breath, wheezing and stridor.    Cardiovascular: Negative for chest pain, palpitations and leg swelling.   Gastrointestinal: Negative for abdominal distention, abdominal pain, anal bleeding, blood in stool, constipation, diarrhea, nausea, rectal pain and vomiting.   Endocrine: Negative for cold intolerance, heat intolerance, polydipsia, polyphagia and polyuria.   Genitourinary: Negative for decreased urine volume, difficulty urinating, discharge, dysuria, enuresis, flank pain, frequency, genital sores, hematuria,  penile pain, penile swelling, scrotal swelling, testicular pain and urgency.   Musculoskeletal: Negative for arthralgias, back pain, gait problem, joint swelling, myalgias, neck pain and neck stiffness.   Skin: Negative for color change, pallor, rash and wound.   Allergic/Immunologic: Negative for environmental allergies, food allergies and immunocompromised state.   Neurological: Negative for dizziness, tremors, seizures, syncope, facial asymmetry, speech difficulty, weakness, light-headedness, numbness and headaches.   Hematological: Negative for adenopathy. Does not bruise/bleed easily.   Psychiatric/Behavioral: Positive for dysphoric mood. Negative for agitation, behavioral problems, confusion, decreased concentration, hallucinations, self-injury, sleep disturbance and suicidal ideas. The patient is nervous/anxious. The patient is not hyperactive.        Objective:      Physical Exam   Constitutional: He is oriented to person, place, and time. He appears well-developed and well-nourished. He appears distressed.   HENT:   Head: Normocephalic.   Right Ear: External ear normal.   Left Ear: External ear normal.   Nose: Nose normal. Right sinus exhibits no maxillary sinus tenderness and no frontal sinus tenderness. Left sinus exhibits no maxillary sinus tenderness and no frontal sinus tenderness.   Mouth/Throat: Oropharynx is clear and moist. No oropharyngeal exudate.   Eyes: Pupils are equal, round, and reactive to light. EOM and lids are normal. Right eye exhibits no discharge. Left eye exhibits no discharge. Right conjunctiva is not injected. Right conjunctiva has no hemorrhage. Left conjunctiva is not injected. Left conjunctiva has no hemorrhage. No scleral icterus. Right eye exhibits normal extraocular motion. Left eye exhibits normal extraocular motion.   Neck: Normal range of motion. Neck supple. No JVD present. No tracheal deviation present. No thyromegaly present.   Cardiovascular: Normal rate and regular  rhythm.   Pulmonary/Chest: Effort normal. No stridor. No respiratory distress.   Abdominal: Soft. He exhibits no mass. There is no hepatosplenomegaly, splenomegaly or hepatomegaly. There is no tenderness.   Musculoskeletal: Normal range of motion. He exhibits no edema or tenderness.   Lymphadenopathy:        Head (right side): No posterior auricular and no occipital adenopathy present.        Head (left side): No posterior auricular and no occipital adenopathy present.     He has no cervical adenopathy.        Right cervical: No superficial cervical, no deep cervical and no posterior cervical adenopathy present.       Left cervical: No superficial cervical, no deep cervical and no posterior cervical adenopathy present.     He has no axillary adenopathy.        Right: No supraclavicular adenopathy present.        Left: No supraclavicular adenopathy present.   Neurological: He is alert and oriented to person, place, and time. He has normal strength. No cranial nerve deficit. Coordination normal.   Skin: Skin is dry. No rash noted. He is not diaphoretic. No cyanosis or erythema. Nails show no clubbing.   Psychiatric: He has a normal mood and affect. His behavior is normal. Judgment and thought content normal. Cognition and memory are normal.   Vitals reviewed.          Assessment:      1. Portal vein thrombosis    2. Neoplasm of abdomen           Plan:     Reviewed results of MRI of abdomen and results of path report no evidence of malignancy will reimage in approximately 1 month for response.  Patient still has persistent pain in abdomen ; would recommend at this point that patient be he is switched to Lovenox 1 milligram/kilogram twice a day as opposed to Eliquis with persistent pain in thrombosis.  Discussed implications with him        Jardo Kevin Jr, MD FACP

## 2019-07-30 ENCOUNTER — PATIENT MESSAGE (OUTPATIENT)
Dept: HEMATOLOGY/ONCOLOGY | Facility: CLINIC | Age: 79
End: 2019-07-30

## 2019-08-05 ENCOUNTER — PATIENT MESSAGE (OUTPATIENT)
Dept: HEMATOLOGY/ONCOLOGY | Facility: CLINIC | Age: 79
End: 2019-08-05

## 2019-08-05 ENCOUNTER — TELEPHONE (OUTPATIENT)
Dept: HEMATOLOGY/ONCOLOGY | Facility: CLINIC | Age: 79
End: 2019-08-05

## 2019-08-05 NOTE — TELEPHONE ENCOUNTER
----- Message from Fay Milian sent at 8/5/2019  9:38 AM CDT -----  Contact: pt  Pt request call back from [facility] regarding shots  call back ... [phone] ([id number])

## 2019-08-14 ENCOUNTER — OFFICE VISIT (OUTPATIENT)
Dept: HEMATOLOGY/ONCOLOGY | Facility: CLINIC | Age: 79
End: 2019-08-14
Payer: MEDICARE

## 2019-08-14 VITALS
OXYGEN SATURATION: 99 % | HEIGHT: 70 IN | RESPIRATION RATE: 17 BRPM | SYSTOLIC BLOOD PRESSURE: 155 MMHG | DIASTOLIC BLOOD PRESSURE: 71 MMHG | WEIGHT: 208.75 LBS | TEMPERATURE: 98 F | BODY MASS INDEX: 29.89 KG/M2 | HEART RATE: 79 BPM

## 2019-08-14 DIAGNOSIS — R16.0 LIVER MASS, LEFT LOBE: ICD-10-CM

## 2019-08-14 DIAGNOSIS — I81 PORTAL VEIN THROMBOSIS: Primary | ICD-10-CM

## 2019-08-14 PROCEDURE — 99999 PR PBB SHADOW E&M-EST. PATIENT-LVL III: ICD-10-PCS | Mod: PBBFAC,,, | Performed by: NURSE PRACTITIONER

## 2019-08-14 PROCEDURE — 99213 OFFICE O/P EST LOW 20 MIN: CPT | Mod: PBBFAC | Performed by: NURSE PRACTITIONER

## 2019-08-14 PROCEDURE — 99214 PR OFFICE/OUTPT VISIT, EST, LEVL IV, 30-39 MIN: ICD-10-PCS | Mod: S$PBB,,, | Performed by: NURSE PRACTITIONER

## 2019-08-14 PROCEDURE — 99999 PR PBB SHADOW E&M-EST. PATIENT-LVL III: CPT | Mod: PBBFAC,,, | Performed by: NURSE PRACTITIONER

## 2019-08-14 PROCEDURE — 99214 OFFICE O/P EST MOD 30 MIN: CPT | Mod: S$PBB,,, | Performed by: NURSE PRACTITIONER

## 2019-08-14 RX ORDER — OXYCODONE AND ACETAMINOPHEN 5; 325 MG/1; MG/1
1 TABLET ORAL EVERY 6 HOURS PRN
Qty: 20 TABLET | Refills: 0 | Status: SHIPPED | OUTPATIENT
Start: 2019-08-14 | End: 2019-10-04 | Stop reason: SDUPTHER

## 2019-08-14 RX ORDER — CLONAZEPAM 1 MG/1
1 TABLET ORAL
Qty: 1 TABLET | Refills: 0 | Status: SHIPPED | OUTPATIENT
Start: 2019-08-14 | End: 2020-03-05

## 2019-08-14 RX ORDER — METOPROLOL TARTRATE 25 MG/1
TABLET, FILM COATED ORAL
COMMUNITY
Start: 2019-08-05

## 2019-08-14 NOTE — PROGRESS NOTES
Subjective:       Patient ID: Ottoniel Arellano Jr. is a 78 y.o. male.    Chief Complaint: Persistent mid-epigastric pain    HPI: 78 y.o male with recently diagnosed Portal Vein Thrombosis 7/12/19. Patient was started on Eliquis but had c/o persistent abdominal pain. His anticoagulation was consequently switched to Lovenox 1 mg/kg BID 7/29/19.     Patient presents today as an urgent care visit for persistent mid epigastric abdominal pain. Patient reports episode of severe pain this morning prompting visit to us. Currently reports pain 4/10. Denies N/V/D, constipation, dizziness, lightheadedness, bowel or urinary complaints, hematuria, hematochezia, SOB, CP  Social History     Socioeconomic History    Marital status:      Spouse name: Not on file    Number of children: Not on file    Years of education: Not on file    Highest education level: Not on file   Occupational History    Not on file   Social Needs    Financial resource strain: Not on file    Food insecurity:     Worry: Not on file     Inability: Not on file    Transportation needs:     Medical: Not on file     Non-medical: Not on file   Tobacco Use    Smoking status: Former Smoker    Smokeless tobacco: Former User     Quit date: 5/18/1986   Substance and Sexual Activity    Alcohol use: No    Drug use: Not on file    Sexual activity: Not on file   Lifestyle    Physical activity:     Days per week: Not on file     Minutes per session: Not on file    Stress: Not on file   Relationships    Social connections:     Talks on phone: Not on file     Gets together: Not on file     Attends [employer] service: Not on file     Active member of club or organization: Not on file     Attends meetings of clubs or organizations: Not on file     Relationship status: Not on file   Other Topics Concern    Not on file   Social History Narrative    Not on file       Past Medical History:   Diagnosis Date    Arthritis     Coronary artery disease     Hx of  heart artery stent        History reviewed. No pertinent family history.    Past Surgical History:   Procedure Laterality Date    CARDIAC SURGERY      stents 12/29/2012    EYE SURGERY      right, skin graft    INJECTION-STEROID-EPIDURAL-CAUDAL N/A 5/20/2016    Performed by Francis Jackson Jr[facility] at [facility] OR    SPINAL FUSION      ACDF 05/04/2010    TONSILLECTOMY         Review of Systems   Constitutional: Negative for activity change, appetite change, chills, diaphoresis, fatigue, fever and unexpected weight change.   HENT: Negative for congestion, mouth sores, nosebleeds, sore throat, trouble swallowing and voice change.    Eyes: Negative for photophobia and visual disturbance.   Respiratory: Negative for cough, chest tightness, shortness of breath and wheezing.    Cardiovascular: Negative for chest pain and leg swelling.   Gastrointestinal: Positive for abdominal pain. Negative for abdominal distention, anal bleeding, blood in stool, constipation, diarrhea, nausea and vomiting.   Genitourinary: Negative for difficulty urinating, dysuria and hematuria.   Musculoskeletal: Negative for arthralgias, back pain and myalgias.   Skin: Negative for pallor, rash and wound.   Neurological: Negative for dizziness, syncope, weakness and headaches.   Hematological: Negative for adenopathy. Does not bruise/bleed easily.   Psychiatric/Behavioral: The patient is nervous/anxious.          Medication List with Changes/Refills   New Medications    OXYCODONE-ACETAMINOPHEN (PERCOCET) 5-325 MG PER TABLET    Take 1 tablet by mouth every 6 (six) hours as needed for Pain.   Current Medications    APIXABAN (ELIQUIS) 5 MG TAB    Take 1 tablet (5 mg total) by mouth 2 (two) times daily.    ASCORBIC ACID (VITAMIN C) 500 MG TABLET    Take 500 mg by mouth once daily.    ASPIRIN 81 MG CHEW    Take 81 mg by mouth once daily.    ATORVASTATIN (LIPITOR) 40 MG TABLET    Take 40 mg by mouth once daily.     CINNAMON BARK 500 MG CAPSULE    Take  1,000 mg by mouth once daily.    DIPHENHYDRAMINE (BENADRYL) 25 MG CAPSULE    Take 50 mg by mouth nightly as needed for Itching.    ENOXAPARIN (LOVENOX) 100 MG/ML SYRG    Inject 0.9 mLs (90 mg total) into the skin 2 (two) times daily.    FISH OIL-OMEGA-3 FATTY ACIDS 300-1,000 MG CAPSULE    Take 2 g by mouth once daily.    FLUTICASONE PROPIONATE (FLONASE ALLERGY RELIEF) 50 MCG/ACTUATION NASAL SPRAY    1 spray by Each Nare route once daily.    MELOXICAM (MOBIC) 15 MG TABLET    Take 15 mg by mouth daily as needed for Pain.    METOPROLOL SUCCINATE (TOPROL-XL) 25 MG 24 HR TABLET    Take 25 mg by mouth once daily.    METOPROLOL TARTRATE (LOPRESSOR) 25 MG TABLET        NEXIUM 40 MG CAPSULE    Take 40 mg by mouth daily as needed.     ONDANSETRON (ZOFRAN-ODT) 4 MG TBDL        PROMETHAZINE (PHENERGAN) 12.5 MG TAB    Take 1 tablet (12.5 mg total) by mouth every 6 to 8 hours as needed.    TAMSULOSIN (FLOMAX) 0.4 MG CP24    Take 0.4 mg by mouth every evening.     VITAMIN D (VITAMIN D3) 1000 UNITS TAB    Take 1,000 Units by mouth.    VITAMIN E 400 UNIT TAB    Take 1 tablet by mouth once daily.   Changed and/or Refilled Medications    Modified Medication Previous Medication    CLONAZEPAM (KLONOPIN) 1 MG TABLET clonazePAM (KLONOPIN) 1 MG tablet       Take 1 tablet (1 mg total) by mouth On call Procedure for Anxiety. Take 1 tablet 1 hr before the MRI procedure    Take 1 tablet (1 mg total) by mouth once daily. Take 1 tablet 1 hr before the MRI procedure     Objective:     Vitals:    08/14/19 1310   BP: (!) 155/71   Pulse: 79   Resp: 17   Temp: 98.3 °F (36.8 °C)     Lab Results   Component Value Date    WBC 11.45 07/12/2019    WBC 11.45 07/12/2019    HGB 12.8 (L) 07/12/2019    HGB 12.8 (L) 07/12/2019    HCT 38.9 (L) 07/12/2019    HCT 38.9 (L) 07/12/2019    MCV 94 07/12/2019    MCV 94 07/12/2019     07/12/2019     07/12/2019     BMP  Lab Results   Component Value Date     07/12/2019    K 4.4 07/12/2019      07/12/2019    CO2 25 07/12/2019    BUN 11 07/12/2019    CREATININE 0.9 07/12/2019    CALCIUM 9.4 07/12/2019    ANIONGAP 10 07/12/2019    ESTGFRAFRICA >60 07/12/2019    EGFRNONAA >60 07/12/2019     Lab Results   Component Value Date    ALT 30 07/12/2019    AST 26 07/12/2019    ALKPHOS 93 07/12/2019    BILITOT 1.0 07/12/2019     Physical Exam   Constitutional: He is oriented to person, place, and time. He appears well-developed and well-nourished. He is cooperative.   HENT:   Head: Normocephalic.   Right Ear: External ear normal.   Left Ear: External ear normal.   Nose: Nose normal.   Mouth/Throat: Oropharynx is clear and moist.   Eyes: Conjunctivae, EOM and lids are normal. Right eye exhibits no discharge. Left eye exhibits no discharge. No scleral icterus.   Neck: Normal range of motion. No thyroid mass present.   Cardiovascular: Normal rate, regular rhythm and normal heart sounds.   Pulmonary/Chest: Effort normal and breath sounds normal. No respiratory distress. He has no wheezes. He has no rhonchi. He has no rales.   Abdominal: Soft. Bowel sounds are normal. He exhibits no distension. There is no tenderness.   Genitourinary:   Genitourinary Comments: deferred   Musculoskeletal: Normal range of motion. He exhibits no edema.   Lymphadenopathy:        Head (right side): No submandibular, no preauricular and no posterior auricular adenopathy present.        Head (left side): No submandibular, no preauricular and no posterior auricular adenopathy present.        Right cervical: No superficial cervical adenopathy present.       Left cervical: No superficial cervical adenopathy present.   Neurological: He is alert and oriented to person, place, and time.   Skin: Skin is warm and dry.        Psychiatric: His speech is normal and behavior is normal. Thought content normal. His mood appears anxious.   Vitals reviewed.       Assessment:     Problem List Items Addressed This Visit        Hematology    Portal vein thrombosis  - Primary     Patient with persistent mid epigastric abdominal pain with known dx portal vein thrombosis. Patient recently switched from Eliquis to Lovenox 1mg/kg twice daily dosing    Original repeat MRI scheduled for 9/4/19 with clinic follow up after with Dr. Kevin. Reschedule MRI ASAP. Follow up with Dr. Kevin afterwards. Prescription sent for Percocet 5 mg PO Q 6 H PRN pain          Relevant Medications    oxyCODONE-acetaminophen (PERCOCET) 5-325 mg per tablet      Other Visit Diagnoses     Liver mass, left lobe        Relevant Medications    clonazePAM (KLONOPIN) 1 MG tablet            Plan:     Portal vein thrombosis  -     oxyCODONE-acetaminophen (PERCOCET) 5-325 mg per tablet; Take 1 tablet by mouth every 6 (six) hours as needed for Pain.  Dispense: 20 tablet; Refill: 0    Liver mass, left lobe  -     clonazePAM (KLONOPIN) 1 MG tablet; Take 1 tablet (1 mg total) by mouth On call Procedure for Anxiety. Take 1 tablet 1 hr before the MRI procedure  Dispense: 1 tablet; Refill: 0          I will review assessment/plan with collaborating physician EVELYN Bales

## 2019-08-14 NOTE — ASSESSMENT & PLAN NOTE
Patient with persistent mid epigastric abdominal pain with known dx portal vein thrombosis. Patient recently switched from Eliquis to Lovenox 1mg/kg twice daily dosing    Original repeat MRI scheduled for 9/4/19 with clinic follow up after with Dr. Kevin. Reschedule MRI ASAP. Follow up with Dr. Kevin afterwards. Prescription sent for Percocet 5 mg PO Q 6 H PRN pain

## 2019-08-22 DIAGNOSIS — I81 PORTAL VEIN THROMBOSIS: Primary | ICD-10-CM

## 2019-08-23 ENCOUNTER — TELEPHONE (OUTPATIENT)
Dept: RADIOLOGY | Facility: HOSPITAL | Age: 79
End: 2019-08-23

## 2019-08-26 ENCOUNTER — ANTI-COAG VISIT (OUTPATIENT)
Dept: CARDIOLOGY | Facility: CLINIC | Age: 79
End: 2019-08-26
Payer: MEDICARE

## 2019-08-26 ENCOUNTER — HOSPITAL ENCOUNTER (OUTPATIENT)
Dept: RADIOLOGY | Facility: HOSPITAL | Age: 79
Discharge: HOME | End: 2019-08-26
Attending: INTERNAL MEDICINE
Payer: MEDICARE

## 2019-08-26 ENCOUNTER — OFFICE VISIT (OUTPATIENT)
Dept: HEMATOLOGY/ONCOLOGY | Facility: CLINIC | Age: 79
End: 2019-08-26
Payer: MEDICARE

## 2019-08-26 VITALS
RESPIRATION RATE: 18 BRPM | WEIGHT: 205.25 LBS | TEMPERATURE: 98 F | HEIGHT: 70 IN | SYSTOLIC BLOOD PRESSURE: 147 MMHG | DIASTOLIC BLOOD PRESSURE: 79 MMHG | HEART RATE: 80 BPM | BODY MASS INDEX: 29.38 KG/M2 | OXYGEN SATURATION: 98 %

## 2019-08-26 DIAGNOSIS — I81 PORTAL VEIN THROMBOSIS: Primary | ICD-10-CM

## 2019-08-26 DIAGNOSIS — I81 PORTAL VEIN THROMBOSIS: ICD-10-CM

## 2019-08-26 DIAGNOSIS — D49.89 NEOPLASM OF ABDOMEN: ICD-10-CM

## 2019-08-26 DIAGNOSIS — Z79.01 LONG TERM (CURRENT) USE OF ANTICOAGULANTS: ICD-10-CM

## 2019-08-26 PROBLEM — I10 ESSENTIAL HYPERTENSION: Status: ACTIVE | Noted: 2019-08-26

## 2019-08-26 PROBLEM — E78.49 OTHER HYPERLIPIDEMIA: Status: ACTIVE | Noted: 2019-08-26

## 2019-08-26 PROCEDURE — 99213 OFFICE O/P EST LOW 20 MIN: CPT | Mod: PBBFAC,25 | Performed by: INTERNAL MEDICINE

## 2019-08-26 PROCEDURE — 99214 OFFICE O/P EST MOD 30 MIN: CPT | Mod: S$PBB,,, | Performed by: INTERNAL MEDICINE

## 2019-08-26 PROCEDURE — 25500020 PHARM REV CODE 255: Mod: PO | Performed by: INTERNAL MEDICINE

## 2019-08-26 PROCEDURE — 99999 PR PBB SHADOW E&M-EST. PATIENT-LVL III: CPT | Mod: PBBFAC,,, | Performed by: INTERNAL MEDICINE

## 2019-08-26 PROCEDURE — A9585 GADOBUTROL INJECTION: HCPCS | Mod: PO | Performed by: INTERNAL MEDICINE

## 2019-08-26 PROCEDURE — 99999 PR PBB SHADOW E&M-EST. PATIENT-LVL III: ICD-10-PCS | Mod: PBBFAC,,, | Performed by: INTERNAL MEDICINE

## 2019-08-26 PROCEDURE — 74183 MRI ABD W/O CNTR FLWD CNTR: CPT | Mod: TC,PO

## 2019-08-26 PROCEDURE — 74183 MRI ABDOMEN W WO CONTRAST: ICD-10-PCS | Mod: 26,,, | Performed by: RADIOLOGY

## 2019-08-26 PROCEDURE — 99214 PR OFFICE/OUTPT VISIT, EST, LEVL IV, 30-39 MIN: ICD-10-PCS | Mod: S$PBB,,, | Performed by: INTERNAL MEDICINE

## 2019-08-26 PROCEDURE — 74183 MRI ABD W/O CNTR FLWD CNTR: CPT | Mod: 26,,, | Performed by: RADIOLOGY

## 2019-08-26 RX ORDER — WARFARIN SODIUM 5 MG/1
5 TABLET ORAL DAILY
Qty: 30 TABLET | Refills: 11 | Status: SHIPPED | OUTPATIENT
Start: 2019-08-26 | End: 2019-10-22 | Stop reason: SDUPTHER

## 2019-08-26 RX ORDER — GADOBUTROL 604.72 MG/ML
9 INJECTION INTRAVENOUS
Status: COMPLETED | OUTPATIENT
Start: 2019-08-26 | End: 2019-08-26

## 2019-08-26 RX ADMIN — GADOBUTROL 9 ML: 604.72 INJECTION INTRAVENOUS at 10:08

## 2019-08-26 NOTE — PROGRESS NOTES
Subjective:       Patient ID: Ottoniel Arellano Jr. is a 78 y.o. male.    Chief Complaint: Follow-up; Results; and Coagulation Disorder    HPI 78-year-old male portal vein thrombosis returns for review of MRI for abnormal area in segment 4 liver ECOG status 1 current 9 Lovenox twice a day    Past Medical History:   Diagnosis Date    Arthritis     Coronary artery disease     Hx of heart artery stent      History reviewed. No pertinent family history.  Social History     Socioeconomic History    Marital status:      Spouse name: Not on file    Number of children: Not on file    Years of education: Not on file    Highest education level: Not on file   Occupational History    Not on file   Social Needs    Financial resource strain: Not on file    Food insecurity:     Worry: Not on file     Inability: Not on file    Transportation needs:     Medical: Not on file     Non-medical: Not on file   Tobacco Use    Smoking status: Former Smoker    Smokeless tobacco: Former User     Quit date: 5/18/1986   Substance and Sexual Activity    Alcohol use: No    Drug use: Not on file    Sexual activity: Not on file   Lifestyle    Physical activity:     Days per week: Not on file     Minutes per session: Not on file    Stress: Not on file   Relationships    Social connections:     Talks on phone: Not on file     Gets together: Not on file     Attends Spiritism service: Not on file     Active member of club or organization: Not on file     Attends meetings of clubs or organizations: Not on file     Relationship status: Not on file   Other Topics Concern    Not on file   Social History Narrative    Not on file     Past Surgical History:   Procedure Laterality Date    CARDIAC SURGERY      stents 12/29/2012    EYE SURGERY      right, skin graft    INJECTION-STEROID-EPIDURAL-CAUDAL N/A 5/20/2016    Performed by Francis Jackson Jr., [facility] OR    SPINAL FUSION      ACDF 05/04/2010    TONSILLECTOMY          Labs:  Lab Results   Component Value Date    WBC 11.45 07/12/2019    WBC 11.45 07/12/2019    HGB 12.8 (L) 07/12/2019    HGB 12.8 (L) 07/12/2019    HCT 38.9 (L) 07/12/2019    HCT 38.9 (L) 07/12/2019    MCV 94 07/12/2019    MCV 94 07/12/2019     07/12/2019     07/12/2019     BMP  Lab Results   Component Value Date     07/12/2019    K 4.4 07/12/2019     07/12/2019    CO2 25 07/12/2019    BUN 11 07/12/2019    CREATININE 1.1 08/26/2019    CALCIUM 9.4 07/12/2019    ANIONGAP 10 07/12/2019    ESTGFRAFRICA >60 08/26/2019    EGFRNONAA >60 08/26/2019     Lab Results   Component Value Date    ALT 30 07/12/2019    AST 26 07/12/2019    ALKPHOS 93 07/12/2019    BILITOT 1.0 07/12/2019       No results found for: IRON, TIBC, FERRITIN, SATURATEDIRO  No results found for: [id number]  No results found for: FOLATE  No results found for: TSH      Review of Systems   Constitutional: Negative for activity change, appetite change, chills, diaphoresis, fatigue, fever and unexpected weight change.   HENT: Negative for congestion, dental problem, drooling, ear discharge, ear pain, facial swelling, hearing loss, mouth sores, nosebleeds, postnasal drip, rhinorrhea, sinus pressure, sneezing, sore throat, tinnitus, trouble swallowing and voice change.    Eyes: Negative for photophobia, pain, discharge, redness, itching and visual disturbance.   Respiratory: Negative for apnea, cough, choking, chest tightness, shortness of breath, wheezing and stridor.    Cardiovascular: Negative for chest pain, palpitations and leg swelling.   Gastrointestinal: Positive for abdominal pain. Negative for abdominal distention, anal bleeding, blood in stool, constipation, diarrhea, nausea, rectal pain and vomiting.   Endocrine: Negative for cold intolerance, heat intolerance, polydipsia, polyphagia and polyuria.   Genitourinary: Negative for decreased urine volume, difficulty urinating, discharge, dysuria, enuresis, flank pain,  frequency, genital sores, hematuria, penile pain, penile swelling, scrotal swelling, testicular pain and urgency.   Musculoskeletal: Negative for arthralgias, back pain, gait problem, joint swelling, myalgias, neck pain and neck stiffness.   Skin: Negative for color change, pallor, rash and wound.   Allergic/Immunologic: Negative for environmental allergies, food allergies and immunocompromised state.   Neurological: Negative for dizziness, tremors, seizures, syncope, facial asymmetry, speech difficulty, weakness, light-headedness, numbness and headaches.   Hematological: Negative for adenopathy. Does not bruise/bleed easily.   Psychiatric/Behavioral: Positive for dysphoric mood. Negative for agitation, behavioral problems, confusion, decreased concentration, hallucinations, self-injury, sleep disturbance and suicidal ideas. The patient is nervous/anxious. The patient is not hyperactive.        Objective:      Physical Exam   Constitutional: He is oriented to person, place, and time. He appears well-developed and well-nourished. He appears distressed.   HENT:   Head: Normocephalic.   Right Ear: External ear normal.   Left Ear: External ear normal.   Nose: Nose normal. Right sinus exhibits no maxillary sinus tenderness and no frontal sinus tenderness. Left sinus exhibits no maxillary sinus tenderness and no frontal sinus tenderness.   Mouth/Throat: Oropharynx is clear and moist. No oropharyngeal exudate.   Eyes: Pupils are equal, round, and reactive to light. EOM and lids are normal. Right eye exhibits no discharge. Left eye exhibits no discharge. Right conjunctiva is not injected. Right conjunctiva has no hemorrhage. Left conjunctiva is not injected. Left conjunctiva has no hemorrhage. No scleral icterus. Right eye exhibits normal extraocular motion. Left eye exhibits normal extraocular motion.   Neck: Normal range of motion. Neck supple. No JVD present. No tracheal deviation present. No thyromegaly present.    Cardiovascular: Normal rate and regular rhythm.   Pulmonary/Chest: Effort normal. No stridor. No respiratory distress.   Abdominal: Soft. He exhibits no mass. There is no hepatosplenomegaly, splenomegaly or hepatomegaly. There is no tenderness.   Musculoskeletal: Normal range of motion. He exhibits no edema or tenderness.   Lymphadenopathy:        Head (right side): No posterior auricular and no occipital adenopathy present.        Head (left side): No posterior auricular and no occipital adenopathy present.     He has no cervical adenopathy.        Right cervical: No superficial cervical, no deep cervical and no posterior cervical adenopathy present.       Left cervical: No superficial cervical, no deep cervical and no posterior cervical adenopathy present.     He has no axillary adenopathy.        Right: No supraclavicular adenopathy present.        Left: No supraclavicular adenopathy present.   Neurological: He is alert and oriented to person, place, and time. He has normal strength. No cranial nerve deficit. Coordination normal.   Skin: Skin is dry. No rash noted. He is not diaphoretic. No cyanosis or erythema. Nails show no clubbing.   Psychiatric: He has a normal mood and affect. His behavior is normal. Judgment and thought content normal. Cognition and memory are normal.   Vitals reviewed.          Assessment:      1. Portal vein thrombosis           Plan:     Continue with Lovenox with transition to warfarin.  Prescription sent will refer to [facility] for follow-up told patient to continue to therapeutic range.  No evidence of progression of abnormal area in liver not suggestive of malignancy reassurance given will repeat imaging in approximately 3 months        Jarod Kevin Jr, MD FACP

## 2019-08-26 NOTE — PROGRESS NOTES
Mr. Arellano is a new patient referred to the coumadin clinic by Dr. Kevin.  Medications and PMH reviewed (Portal vein thrombosis,  CAD, HTN, HLD).  Eliquis was discontinued in July.  Patient currently taking Lovenox 90mg BID injections.  Warfarin 5mg will be started on today.    Baseline INR on today was 1.1.  Instructions given for patient to begin warfarin 5mg every evening and continue Lovenox 90mg BID injections until next INR reading.  Recheck INR on Friday, 8/30/19.    A full discussion of the nature of anticoagulants has been carried out.  A benefit risk analysis has been presented to the patient, so that they understand the justification for choosing anticoagulation at this time. The need for frequent and regular monitoring, precise dosage adjustment and compliance is stressed.  Side effects of potential bleeding are discussed.  The patient should avoid any OTC items containing aspirin or ibuprofen, and should avoid great swings in general diet.  Avoid alcohol consumption.  Call if any signs of abnormal bleeding.      Taking Coumadin   Coumadin (warfarin) helps keep your blood from clotting. But it also increases your risk for bleeding. Because of this, it must be taken exactly as directed. You also need to protect yourself from injury.     Follow These Tips   Take Coumadin at the same time each day. If you miss a dose, take it as soon as you remember (if less then 4 hours); otherwise, if it's almost time for your next dose, skip the missed dose. Do not take a double dose.   Go for your blood (protime/INR) tests as often as directed. Note that diet and   medication can affect your protime/INR level.             REMEMBER:   When value decreases from therapeutic range, the blood                                           gets thicker and when value increases, the blood gets thinner.                                       Dont take any other medications without checking with your healthcare provider first. This  includes aspirin, vitamins, and herbal and other dietary supplements.   Tell all healthcare providers that you take Coumadin. Its also a good idea to carry a medical ID card or wear a medical-alert bracelet.   Use a soft toothbrush and an electric razor.   Dont go barefoot. And dont trim corns or calluses yourself.    When to Call Your Healthcare Provider   Call your healthcare provider right away before you take your next dose of Coumadin if you have any of these problems:   Bleeding that doesnt stop in 10 minutes   Coughing or throwing up blood   Diarrhea or bleeding hemorrhoids   Dark urine or black stools   Red or black-and-blue marks on the skin that get larger   A fever or an illness that gets worse   Dizziness or fatigue   Chest pain or trouble breathing   A serious fall or a blow to the head    Keep Your Diet Steady   Keep your diet pretty much the same each day. Thats because many foods contain vitamin K. Vitamin K helps your blood clot. So eating foods that contain vitamin K can affect the way Coumadin works. You dont need to avoid foods that have vitamin K. But you do need to keep the amount of them you eat steady (about the same day to day). If you change your diet for any reason, such as due to illness or to lose weight, be sure to tell your doctor.     Examples of foods high in vitamin K are brussels sprouts, avocado, broccoli, cabbage, coleslaw, mustard greens, tamera greens, turnip greens, kale, spinach, kayley lettuce, and some other leafy green vegetables. Foods that are mixed with mayonnaise, such as tuna, chicken, and potato salads.  Oils, such as soybean, canola, and Vegetable oils, are also high in vitamin K.       Other food products can affect the way Coumadin works in your body:   Food products that may affect blood clotting include cranberries and cranberry juice, grapefruit juice, fish oil supplements, garlic, tyrel, licorice, and turmeric.   Herbs used in herbal teas or  supplements can also affect blood clotting. Keep the amount of herbal teas and supplements you use steady.   Alcohol can increase the effect of Coumadin in your body.

## 2019-08-30 ENCOUNTER — LAB VISIT (OUTPATIENT)
Dept: LAB | Facility: HOSPITAL | Age: 79
End: 2019-08-30
Attending: INTERNAL MEDICINE
Payer: MEDICARE

## 2019-08-30 DIAGNOSIS — I81 PORTAL VEIN THROMBOSIS: ICD-10-CM

## 2019-08-30 LAB
INR PPP: 1.5 (ref 0.8–1.2)
PROTHROMBIN TIME: 14.9 SEC (ref 9–12.5)

## 2019-08-30 PROCEDURE — 85610 PROTHROMBIN TIME: CPT

## 2019-08-30 PROCEDURE — 36415 COLL VENOUS BLD VENIPUNCTURE: CPT | Mod: PO

## 2019-09-03 ENCOUNTER — ANTI-COAG VISIT (OUTPATIENT)
Dept: CARDIOLOGY | Facility: CLINIC | Age: 79
End: 2019-09-03
Payer: MEDICARE

## 2019-09-03 ENCOUNTER — LAB VISIT (OUTPATIENT)
Dept: LAB | Facility: HOSPITAL | Age: 79
End: 2019-09-03
Attending: INTERNAL MEDICINE
Payer: MEDICARE

## 2019-09-03 DIAGNOSIS — Z79.01 LONG TERM (CURRENT) USE OF ANTICOAGULANTS: Primary | ICD-10-CM

## 2019-09-03 DIAGNOSIS — Z79.01 LONG TERM (CURRENT) USE OF ANTICOAGULANTS: ICD-10-CM

## 2019-09-03 DIAGNOSIS — I81 PORTAL VEIN THROMBOSIS: ICD-10-CM

## 2019-09-03 LAB
INR PPP: 1.6 (ref 0.8–1.2)
PROTHROMBIN TIME: 17.1 SEC (ref 9–12.5)

## 2019-09-03 PROCEDURE — 93793 PR ANTICOAGULANT MGMT FOR PT TAKING WARFARIN: ICD-10-PCS | Mod: ,,,

## 2019-09-03 PROCEDURE — 93793 ANTICOAG MGMT PT WARFARIN: CPT | Mod: ,,,

## 2019-09-03 PROCEDURE — 36415 COLL VENOUS BLD VENIPUNCTURE: CPT | Mod: PO

## 2019-09-03 PROCEDURE — 85610 PROTHROMBIN TIME: CPT

## 2019-09-03 NOTE — PROGRESS NOTES
Patient contacted:  INR remains subtherapeutic at 1.6, but trending upward to goal. Remains on Lovenox.  Patient has taken medication as previously instructed.  No other changes reported.  No signs or symptoms reported.  Will increase patient's Warfarin dose to 7.5 mg every Tuesday and Thursday; and 5 mg on all other days per week and patient will continue Lovenox 90 mg BID injections until next INR reading.  Recheck on Friday, 9/06/2019 ([facility]).  Patient repeated back instructions and voiced understanding.

## 2019-09-06 ENCOUNTER — LAB VISIT (OUTPATIENT)
Dept: LAB | Facility: HOSPITAL | Age: 79
End: 2019-09-06
Attending: NUCLEAR MEDICINE
Payer: MEDICARE

## 2019-09-06 ENCOUNTER — ANTI-COAG VISIT (OUTPATIENT)
Dept: CARDIOLOGY | Facility: CLINIC | Age: 79
End: 2019-09-06
Payer: MEDICARE

## 2019-09-06 DIAGNOSIS — I81 PORTAL VEIN THROMBOSIS: ICD-10-CM

## 2019-09-06 DIAGNOSIS — Z79.01 LONG TERM (CURRENT) USE OF ANTICOAGULANTS: ICD-10-CM

## 2019-09-06 LAB
INR PPP: 2.2 (ref 0.8–1.2)
PROTHROMBIN TIME: 22.3 SEC (ref 9–12.5)

## 2019-09-06 PROCEDURE — 36415 COLL VENOUS BLD VENIPUNCTURE: CPT | Mod: PO

## 2019-09-06 PROCEDURE — 85610 PROTHROMBIN TIME: CPT

## 2019-09-06 PROCEDURE — 93793 ANTICOAG MGMT PT WARFARIN: CPT | Mod: ,,,

## 2019-09-06 PROCEDURE — 93793 PR ANTICOAGULANT MGMT FOR PT TAKING WARFARIN: ICD-10-PCS | Mod: ,,,

## 2019-09-06 NOTE — PROGRESS NOTES
Patient's INR is therapeutic at 2.2.  Patient contacted.  No upcoming procedures or changes reported.  No changes in dose at this time.  Patent will continue current dose of warfarin 7.5mg every Tuesday and Thursday; and 5mg on all other days of the week.  Lovenox injections will be discontinued.  Patient repeated directions and voiced understanding.  Recheck on 9/12/19.  Please call should you have any questions or concerns at [phone] or [phone].

## 2019-09-09 ENCOUNTER — TELEPHONE (OUTPATIENT)
Dept: HEMATOLOGY/ONCOLOGY | Facility: CLINIC | Age: 79
End: 2019-09-09

## 2019-09-09 NOTE — TELEPHONE ENCOUNTER
Patient wife called in regards to her  discontinuing his Lovenox shots until 9/12/19 wanted to make sure Dr. Kevin was aware. I assure the patient that he has reviewed the note.

## 2019-09-12 ENCOUNTER — LAB VISIT (OUTPATIENT)
Dept: LAB | Facility: HOSPITAL | Age: 79
End: 2019-09-12
Attending: NUCLEAR MEDICINE
Payer: MEDICARE

## 2019-09-12 DIAGNOSIS — Z79.01 LONG TERM (CURRENT) USE OF ANTICOAGULANTS: ICD-10-CM

## 2019-09-12 LAB
INR PPP: 2.8 (ref 0.8–1.2)
PROTHROMBIN TIME: 28.6 SEC (ref 9–12.5)

## 2019-09-12 PROCEDURE — 36415 COLL VENOUS BLD VENIPUNCTURE: CPT | Mod: PO

## 2019-09-12 PROCEDURE — 85610 PROTHROMBIN TIME: CPT

## 2019-09-13 ENCOUNTER — ANTI-COAG VISIT (OUTPATIENT)
Dept: CARDIOLOGY | Facility: CLINIC | Age: 79
End: 2019-09-13
Payer: MEDICARE

## 2019-09-13 DIAGNOSIS — Z79.01 LONG TERM (CURRENT) USE OF ANTICOAGULANTS: ICD-10-CM

## 2019-09-13 DIAGNOSIS — I81 PORTAL VEIN THROMBOSIS: ICD-10-CM

## 2019-09-13 PROCEDURE — 93793 PR ANTICOAGULANT MGMT FOR PT TAKING WARFARIN: ICD-10-PCS | Mod: ,,,

## 2019-09-13 PROCEDURE — 93793 ANTICOAG MGMT PT WARFARIN: CPT | Mod: ,,,

## 2019-09-13 NOTE — PROGRESS NOTES
Patient contacted:  INR is therapeutic at 2.8.  No changes reported. Instructions given to continue current dose of warfarin 7.5 mg every Tuesday and Thursday; and 5 mg on all other days of the week.  Recheck on Tuesday, 9/24/2019 ([facility]).  Patient repeated directions and voiced understanding.  Please call should you have any questions or concerns at [phone] or [phone].

## 2019-09-24 ENCOUNTER — ANTI-COAG VISIT (OUTPATIENT)
Dept: CARDIOLOGY | Facility: CLINIC | Age: 79
End: 2019-09-24
Payer: MEDICARE

## 2019-09-24 ENCOUNTER — LAB VISIT (OUTPATIENT)
Dept: LAB | Facility: HOSPITAL | Age: 79
End: 2019-09-24
Payer: MEDICARE

## 2019-09-24 ENCOUNTER — TELEPHONE (OUTPATIENT)
Dept: HEMATOLOGY/ONCOLOGY | Facility: CLINIC | Age: 79
End: 2019-09-24

## 2019-09-24 DIAGNOSIS — I81 PORTAL VEIN THROMBOSIS: ICD-10-CM

## 2019-09-24 DIAGNOSIS — Z79.01 LONG TERM (CURRENT) USE OF ANTICOAGULANTS: ICD-10-CM

## 2019-09-24 LAB
INR PPP: 1.8 (ref 0.8–1.2)
PROTHROMBIN TIME: 19 SEC (ref 9–12.5)

## 2019-09-24 PROCEDURE — 93793 PR ANTICOAGULANT MGMT FOR PT TAKING WARFARIN: ICD-10-PCS | Mod: ,,,

## 2019-09-24 PROCEDURE — 36415 COLL VENOUS BLD VENIPUNCTURE: CPT

## 2019-09-24 PROCEDURE — 85610 PROTHROMBIN TIME: CPT

## 2019-09-24 PROCEDURE — 93793 ANTICOAG MGMT PT WARFARIN: CPT | Mod: ,,,

## 2019-09-24 NOTE — TELEPHONE ENCOUNTER
Attempted to reach the patient to inform him that his appt time had been revised , and also that Dr. Kevin was unavailable on 10/4/19 so he would be seeing Dr. Benoit instead.

## 2019-09-24 NOTE — PROGRESS NOTES
Patient's INR is sub-therapeutic at 1.8.  Patient contacted.  No missed doses or dietary changes reported.  No abnormal pain, swelling or weakness noted.  Instructions given for patient to take warfarin 10mg on today; then resume current dose of 7.5mg on Tuesdays, Thursdays and 5mg on all other days of the week.  Patient voiced understanding.  Follow-up in 2 weeks.

## 2019-10-04 ENCOUNTER — OFFICE VISIT (OUTPATIENT)
Dept: HEMATOLOGY/ONCOLOGY | Facility: CLINIC | Age: 79
End: 2019-10-04
Payer: MEDICARE

## 2019-10-04 ENCOUNTER — ANTI-COAG VISIT (OUTPATIENT)
Dept: CARDIOLOGY | Facility: CLINIC | Age: 79
End: 2019-10-04
Payer: MEDICARE

## 2019-10-04 ENCOUNTER — LAB VISIT (OUTPATIENT)
Dept: LAB | Facility: HOSPITAL | Age: 79
End: 2019-10-04
Attending: NUCLEAR MEDICINE
Payer: MEDICARE

## 2019-10-04 VITALS
TEMPERATURE: 98 F | SYSTOLIC BLOOD PRESSURE: 147 MMHG | BODY MASS INDEX: 28.97 KG/M2 | HEIGHT: 70 IN | RESPIRATION RATE: 17 BRPM | DIASTOLIC BLOOD PRESSURE: 73 MMHG | HEART RATE: 65 BPM | OXYGEN SATURATION: 97 % | WEIGHT: 202.38 LBS

## 2019-10-04 DIAGNOSIS — Z79.01 LONG TERM (CURRENT) USE OF ANTICOAGULANTS: ICD-10-CM

## 2019-10-04 DIAGNOSIS — I81 PORTAL VEIN THROMBOSIS: ICD-10-CM

## 2019-10-04 DIAGNOSIS — I81 PORTAL VEIN THROMBOSIS: Primary | ICD-10-CM

## 2019-10-04 DIAGNOSIS — R16.0 LIVER MASS: ICD-10-CM

## 2019-10-04 LAB
INR PPP: 2 (ref 0.8–1.2)
PROTHROMBIN TIME: 20.9 SEC (ref 9–12.5)

## 2019-10-04 PROCEDURE — 99999 PR PBB SHADOW E&M-EST. PATIENT-LVL IV: ICD-10-PCS | Mod: PBBFAC,,, | Performed by: INTERNAL MEDICINE

## 2019-10-04 PROCEDURE — 36415 COLL VENOUS BLD VENIPUNCTURE: CPT

## 2019-10-04 PROCEDURE — 99999 PR PBB SHADOW E&M-EST. PATIENT-LVL IV: CPT | Mod: PBBFAC,,, | Performed by: INTERNAL MEDICINE

## 2019-10-04 PROCEDURE — 99214 OFFICE O/P EST MOD 30 MIN: CPT | Mod: S$PBB,,, | Performed by: INTERNAL MEDICINE

## 2019-10-04 PROCEDURE — 85610 PROTHROMBIN TIME: CPT

## 2019-10-04 PROCEDURE — 99214 OFFICE O/P EST MOD 30 MIN: CPT | Mod: PBBFAC | Performed by: INTERNAL MEDICINE

## 2019-10-04 PROCEDURE — 99214 PR OFFICE/OUTPT VISIT, EST, LEVL IV, 30-39 MIN: ICD-10-PCS | Mod: S$PBB,,, | Performed by: INTERNAL MEDICINE

## 2019-10-04 RX ORDER — PROMETHAZINE HYDROCHLORIDE 12.5 MG/1
12.5 TABLET ORAL
Qty: 80 TABLET | Refills: 0 | Status: SHIPPED | OUTPATIENT
Start: 2019-10-04

## 2019-10-04 RX ORDER — OXYCODONE AND ACETAMINOPHEN 5; 325 MG/1; MG/1
1 TABLET ORAL EVERY 6 HOURS PRN
Qty: 40 TABLET | Refills: 0 | Status: SHIPPED | OUTPATIENT
Start: 2019-10-04 | End: 2019-12-17 | Stop reason: SDUPTHER

## 2019-10-04 RX ORDER — ONDANSETRON 4 MG/1
4 TABLET, ORALLY DISINTEGRATING ORAL EVERY 8 HOURS PRN
Qty: 50 TABLET | Refills: 1 | Status: ON HOLD | OUTPATIENT
Start: 2019-10-04 | End: 2020-02-05 | Stop reason: HOSPADM

## 2019-10-04 NOTE — PROGRESS NOTES
Patient contacted:  INR is therapeutic at 2.0.  Patient has taken Warfarin medication as previously instructed.  No dietary changes.  Will monitor closely for improvement of INR levels.  Instructions given to maintain current dose of Warfarin 7.5 mg every Tuesday and Thursday; and 5 mg on all other days per week.  Recheck on 10/09/2019 ([facility]).  Patient repeated back instructions and voiced understanding.

## 2019-10-04 NOTE — PROGRESS NOTES
Subjective:       Patient ID: Ottoniel Arellano Jr. is a 78 y.o. male.    Chief Complaint: Portal vein thrombosis [I81]  HPI: We have an opportunity to see Mr. Ottoniel Arellano Jr. in Hematology Oncology clinic at [facility] on 10/04/2019.  Mr. Ottoniel Arellano Jr. is a 78 y.o. gentleman with upper abdominal pain, workup in July 2019 showed portal vein thrombosis and right liver mass, elevated AFP.  Had liver mass biopsy which showed fibrosis.  Has been treated with lovenox bridging and now on coumadin.    Presents today for evaluation, still has intermittent upper abdominal pain.     No history exists.     Past Medical History:   Diagnosis Date    Arthritis     Coronary artery disease     Hx of heart artery stent      No family history on file.  Social History     Socioeconomic History    Marital status:      Spouse name: Not on file    Number of children: Not on file    Years of education: Not on file    Highest education level: Not on file   Occupational History    Not on file   Social Needs    Financial resource strain: Not on file    Food insecurity:     Worry: Not on file     Inability: Not on file    Transportation needs:     Medical: Not on file     Non-medical: Not on file   Tobacco Use    Smoking status: Former Smoker    Smokeless tobacco: Former User     Quit date: 5/18/1986   Substance and Sexual Activity    Alcohol use: No    Drug use: Not on file    Sexual activity: Not on file   Lifestyle    Physical activity:     Days per week: Not on file     Minutes per session: Not on file    Stress: Not on file   Relationships    Social connections:     Talks on phone: Not on file     Gets together: Not on file     Attends Church service: Not on file     Active member of club or organization: Not on file     Attends meetings of clubs or organizations: Not on file     Relationship status: Not on file   Other Topics Concern    Not on file   Social History Narrative    Not on file      Past Surgical History:   Procedure Laterality Date    CARDIAC SURGERY      stents 12/29/2012    EYE SURGERY      right, skin graft    SPINAL FUSION      ACDF 05/04/2010    TONSILLECTOMY       Current Outpatient Medications   Medication Sig Dispense Refill    ascorbic acid (VITAMIN C) 500 MG tablet Take 500 mg by mouth once daily.      aspirin 81 MG Chew Take 81 mg by mouth once daily.      atorvastatin (LIPITOR) 40 MG tablet Take 40 mg by mouth once daily.       cinnamon bark 500 mg capsule Take 1,000 mg by mouth once daily.      clonazePAM (KLONOPIN) 1 MG tablet Take 1 tablet (1 mg total) by mouth On call Procedure for Anxiety. Take 1 tablet 1 hr before the MRI procedure 1 tablet 0    diphenhydrAMINE (BENADRYL) 25 mg capsule Take 50 mg by mouth nightly as needed for Itching.      enoxaparin (LOVENOX) 100 mg/mL Syrg Inject 0.9 mLs (90 mg total) into the skin 2 (two) times daily. (Patient not taking: Reported on 9/24/2019) 10 mL 11    fish oil-omega-3 fatty acids 300-1,000 mg capsule Take 2 g by mouth once daily.      fluticasone propionate (FLONASE ALLERGY RELIEF) 50 mcg/actuation nasal spray 1 spray by Each Nare route once daily.      meloxicam (MOBIC) 15 MG tablet Take 15 mg by mouth daily as needed for Pain.      metoprolol succinate (TOPROL-XL) 25 MG 24 hr tablet Take 25 mg by mouth once daily.      metoprolol tartrate (LOPRESSOR) 25 MG tablet       NEXIUM 40 mg capsule Take 40 mg by mouth daily as needed.       ondansetron (ZOFRAN-ODT) 4 MG TbDL       oxyCODONE-acetaminophen (PERCOCET) 5-325 mg per tablet Take 1 tablet by mouth every 6 (six) hours as needed for Pain. 20 tablet 0    promethazine (PHENERGAN) 12.5 MG Tab Take 1 tablet (12.5 mg total) by mouth every 6 to 8 hours as needed. 80 tablet 0    tamsulosin (FLOMAX) 0.4 mg Cp24 Take 0.4 mg by mouth every evening.       vitamin D (VITAMIN D3) 1000 units Tab Take 1,000 Units by mouth.      vitamin E 400 unit Tab Take 1 tablet by  mouth once daily.      warfarin (COUMADIN) 5 MG tablet Take 1 tablet (5 mg total) by mouth once daily. (Patient taking differently: Take by mouth Daily. Take 1 1/2 tablets every Tuesday and Thursday; and 1 tablet on all other days per week as directed by the Coumadin Clinic.) 30 tablet 11     No current facility-administered medications for this visit.        Labs:  Lab Results   Component Value Date    WBC 11.45 07/12/2019    WBC 11.45 07/12/2019    HGB 12.8 (L) 07/12/2019    HGB 12.8 (L) 07/12/2019    HCT 38.9 (L) 07/12/2019    HCT 38.9 (L) 07/12/2019    MCV 94 07/12/2019    MCV 94 07/12/2019     07/12/2019     07/12/2019     BMP  Lab Results   Component Value Date     07/12/2019    K 4.4 07/12/2019     07/12/2019    CO2 25 07/12/2019    BUN 11 07/12/2019    CREATININE 1.1 08/26/2019    CALCIUM 9.4 07/12/2019    ANIONGAP 10 07/12/2019    ESTGFRAFRICA >60 08/26/2019    EGFRNONAA >60 08/26/2019     Lab Results   Component Value Date    ALT 30 07/12/2019    AST 26 07/12/2019    ALKPHOS 93 07/12/2019    BILITOT 1.0 07/12/2019       No results found for: IRON, TIBC, FERRITIN, SATURATEDIRO  No results found for: [id number]  No results found for: FOLATE  No results found for: TSH    I have reviewed the radiology reports and examined the scan/xray images.    Review of Systems   Constitutional: Negative.    HENT: Negative.    Eyes: Negative.    Respiratory: Negative.    Cardiovascular: Negative.    Gastrointestinal: Positive for abdominal pain.   Endocrine: Negative.    Genitourinary: Negative.    Musculoskeletal: Negative.    Skin: Negative.    Allergic/Immunologic: Negative.    Neurological: Negative.    Hematological: Negative.    Psychiatric/Behavioral: Negative.      ECOG SCORE    0 - Fully active-able to carry on all pre-disease performance without restriction            Objective:   There were no vitals filed for this visit.There is no height or weight on file to calculate BMI.  Physical  Exam   Constitutional: He is oriented to person, place, and time. He appears well-developed and well-nourished.   HENT:   Head: Normocephalic and atraumatic.   Eyes: Conjunctivae and EOM are normal.   Neck: Normal range of motion. Neck supple.   Cardiovascular: Normal rate and regular rhythm.   Pulmonary/Chest: Effort normal and breath sounds normal.   Abdominal: Soft. Bowel sounds are normal.   Musculoskeletal: Normal range of motion.   Neurological: He is alert and oriented to person, place, and time.   Skin: Skin is warm and dry.   Psychiatric: He has a normal mood and affect. His behavior is normal. Judgment and thought content normal.   Nursing note and vitals reviewed.        Assessment:      1. Portal vein thrombosis    2. Liver mass           Plan:     Portal vein thrombosis  Continue on coumadin.  Will need hypercoagulable workup in future.  -     oxyCODONE-acetaminophen (PERCOCET) 5-325 mg per tablet; Take 1 tablet by mouth every 6 (six) hours as needed for Pain.  Dispense: 40 tablet; Refill: 0  -     promethazine (PHENERGAN) 12.5 MG Tab; Take 1 tablet (12.5 mg total) by mouth every 6 to 8 hours as needed.  Dispense: 80 tablet; Refill: 0  -     ondansetron (ZOFRAN-ODT) 4 MG TbDL; Take 1 tablet (4 mg total) by mouth every 8 (eight) hours as needed.  Dispense: 50 tablet; Refill: 1  -     CT Abdomen With Contrast; Future; Expected date: 10/04/2019  -     Cancel: Creatinine, serum; Future; Expected date: 10/04/2019  -     CBC auto differential; Future; Expected date: 10/04/2019  -     Comprehensive metabolic panel; Future; Expected date: 10/04/2019  -     Protime-INR; Future; Expected date: 10/04/2019    Liver mass  Will check with Dr. Ham in IR to see if biopsy of liver mass was done at the appropriate location at the mass.  -     AFP tumor marker; Future; Expected date: 10/04/2019

## 2019-10-08 ENCOUNTER — TELEPHONE (OUTPATIENT)
Dept: RADIOLOGY | Facility: HOSPITAL | Age: 79
End: 2019-10-08

## 2019-10-09 ENCOUNTER — HOSPITAL ENCOUNTER (OUTPATIENT)
Dept: RADIOLOGY | Facility: HOSPITAL | Age: 79
Discharge: HOME | End: 2019-10-09
Attending: INTERNAL MEDICINE
Payer: MEDICARE

## 2019-10-09 ENCOUNTER — ANTI-COAG VISIT (OUTPATIENT)
Dept: CARDIOLOGY | Facility: CLINIC | Age: 79
End: 2019-10-09
Payer: MEDICARE

## 2019-10-09 DIAGNOSIS — I81 PORTAL VEIN THROMBOSIS: ICD-10-CM

## 2019-10-09 DIAGNOSIS — Z79.01 LONG TERM (CURRENT) USE OF ANTICOAGULANTS: Primary | ICD-10-CM

## 2019-10-09 LAB — INR PPP: 1.7 (ref 2–3)

## 2019-10-09 PROCEDURE — 74160 CT ABDOMEN W/CONTRAST: CPT | Mod: 26,,, | Performed by: RADIOLOGY

## 2019-10-09 PROCEDURE — 74160 CT ABDOMEN W/CONTRAST: CPT | Mod: TC

## 2019-10-09 PROCEDURE — 93793 PR ANTICOAGULANT MGMT FOR PT TAKING WARFARIN: ICD-10-PCS | Mod: ,,,

## 2019-10-09 PROCEDURE — 85610 PROTHROMBIN TIME: CPT | Mod: PBBFAC

## 2019-10-09 PROCEDURE — 93793 ANTICOAG MGMT PT WARFARIN: CPT | Mod: ,,,

## 2019-10-09 PROCEDURE — 74160 CT ABDOMEN WITH CONTRAST: ICD-10-PCS | Mod: 26,,, | Performed by: RADIOLOGY

## 2019-10-09 PROCEDURE — 25500020 PHARM REV CODE 255: Performed by: INTERNAL MEDICINE

## 2019-10-09 RX ADMIN — IOHEXOL 100 ML: 350 INJECTION, SOLUTION INTRAVENOUS at 01:10

## 2019-10-09 NOTE — PROGRESS NOTES
Patient's INR is sub-therapeutic at 1.7.  No missed doses or significant changes reported.   No abnormal pain, swelling or weakness noted.  Instructions given for patient to increase dose of warfarin to 10mg on Tuesdays and Thursdays; and 5mg on all other days of the week.  Patient voiced understanding.  Follow-up on 10/16/19 with labs.

## 2019-10-10 ENCOUNTER — TELEPHONE (OUTPATIENT)
Dept: HEMATOLOGY/ONCOLOGY | Facility: CLINIC | Age: 79
End: 2019-10-10

## 2019-10-10 NOTE — TELEPHONE ENCOUNTER
----- Message from Guera Steward sent at 10/10/2019  9:21 AM CDT -----  Contact: Pt  Please give pt a call at [phone] (home) regarding some questions about his last appt.

## 2019-10-10 NOTE — TELEPHONE ENCOUNTER
Returned pt's call and he had questions regarding a gas card that was discussed at last visit with Dr. Benoit. Gave pt the # to SW and asked him to call and speak with one of them. Pt verbalized understanding and thanked me for the help.

## 2019-10-17 ENCOUNTER — LAB VISIT (OUTPATIENT)
Dept: LAB | Facility: HOSPITAL | Age: 79
End: 2019-10-17
Attending: NUCLEAR MEDICINE
Payer: MEDICARE

## 2019-10-17 ENCOUNTER — ANTI-COAG VISIT (OUTPATIENT)
Dept: CARDIOLOGY | Facility: CLINIC | Age: 79
End: 2019-10-17
Payer: MEDICARE

## 2019-10-17 DIAGNOSIS — Z79.01 LONG TERM (CURRENT) USE OF ANTICOAGULANTS: ICD-10-CM

## 2019-10-17 DIAGNOSIS — I81 PORTAL VEIN THROMBOSIS: ICD-10-CM

## 2019-10-17 LAB
INR PPP: 2.3 (ref 0.8–1.2)
PROTHROMBIN TIME: 24.1 SEC (ref 9–12.5)

## 2019-10-17 PROCEDURE — 36415 COLL VENOUS BLD VENIPUNCTURE: CPT | Mod: PO

## 2019-10-17 PROCEDURE — 85610 PROTHROMBIN TIME: CPT

## 2019-10-17 PROCEDURE — 93793 ANTICOAG MGMT PT WARFARIN: CPT | Mod: ,,,

## 2019-10-17 PROCEDURE — 93793 PR ANTICOAGULANT MGMT FOR PT TAKING WARFARIN: ICD-10-PCS | Mod: ,,,

## 2019-10-17 NOTE — PROGRESS NOTES
Patient contacted:  INR is therapeutic at 2.3.  Patient has taken medication as previously instructed.  No other significant changes reported.  Will maintain current dose of Warfarin 10 mg every Tuesday and Thursday; and 5 mg on all other days per week.  Recheck on 10/31/2019 ([facility]).  Patient repeated back instructions and voiced understanding.

## 2019-10-22 DIAGNOSIS — I81 PORTAL VEIN THROMBOSIS: ICD-10-CM

## 2019-10-22 RX ORDER — WARFARIN SODIUM 5 MG/1
TABLET ORAL
Qty: 40 TABLET | Refills: 3 | Status: SHIPPED | OUTPATIENT
Start: 2019-10-22 | End: 2020-03-17

## 2019-10-24 ENCOUNTER — TELEPHONE (OUTPATIENT)
Dept: HEMATOLOGY/ONCOLOGY | Facility: CLINIC | Age: 79
End: 2019-10-24

## 2019-10-24 NOTE — TELEPHONE ENCOUNTER
Called pt to f/u per voicemails. He was referred to SW for gas card to help with traveling long distance. Fortunately (yet unfortunately for the gas cards) he does not have cancer, but since it was offered to him, SW will give him one gas card because he does drive about 75 miles one-way to his appointment. Let him know that if any other resources become available, SW will help him accordingly. He has SW contact info as provided by clinic and was encouraged to call again if needs arise. SW will f/u further as needed.

## 2019-10-31 ENCOUNTER — LAB VISIT (OUTPATIENT)
Dept: LAB | Facility: HOSPITAL | Age: 79
End: 2019-10-31
Attending: NUCLEAR MEDICINE
Payer: MEDICARE

## 2019-10-31 DIAGNOSIS — Z79.01 LONG TERM (CURRENT) USE OF ANTICOAGULANTS: ICD-10-CM

## 2019-10-31 LAB
INR PPP: 1.9 (ref 0.8–1.2)
PROTHROMBIN TIME: 20.4 SEC (ref 9–12.5)

## 2019-10-31 PROCEDURE — 36415 COLL VENOUS BLD VENIPUNCTURE: CPT | Mod: PO

## 2019-10-31 PROCEDURE — 85610 PROTHROMBIN TIME: CPT

## 2019-11-01 ENCOUNTER — ANTI-COAG VISIT (OUTPATIENT)
Dept: CARDIOLOGY | Facility: CLINIC | Age: 79
End: 2019-11-01
Payer: MEDICARE

## 2019-11-01 DIAGNOSIS — I81 PORTAL VEIN THROMBOSIS: ICD-10-CM

## 2019-11-01 DIAGNOSIS — Z79.01 LONG TERM (CURRENT) USE OF ANTICOAGULANTS: ICD-10-CM

## 2019-11-01 PROCEDURE — 93793 ANTICOAG MGMT PT WARFARIN: CPT | Mod: ,,,

## 2019-11-01 PROCEDURE — 93793 PR ANTICOAGULANT MGMT FOR PT TAKING WARFARIN: ICD-10-PCS | Mod: ,,,

## 2019-11-01 NOTE — PROGRESS NOTES
Patient's INR is sub-therapeutic at 1.9.  Patient contacted.  No missed doses or significant changes reported.   No abnormal pain, swelling or SOB noted.  Instructions given for patient to increase dose of warfarin to 10mg on Tuesdays, Thursdays and Saturdays; and 5mg on all other days of the week.  Patient repeated directions and voiced understanding.  Follow-up on Monday, 11/04/19 before hematology appt.

## 2019-11-04 ENCOUNTER — LAB VISIT (OUTPATIENT)
Dept: LAB | Facility: HOSPITAL | Age: 79
End: 2019-11-04
Attending: NUCLEAR MEDICINE
Payer: MEDICARE

## 2019-11-04 ENCOUNTER — ANTI-COAG VISIT (OUTPATIENT)
Dept: CARDIOLOGY | Facility: CLINIC | Age: 79
End: 2019-11-04
Payer: MEDICARE

## 2019-11-04 ENCOUNTER — OFFICE VISIT (OUTPATIENT)
Dept: HEMATOLOGY/ONCOLOGY | Facility: CLINIC | Age: 79
End: 2019-11-04
Payer: MEDICARE

## 2019-11-04 VITALS
DIASTOLIC BLOOD PRESSURE: 75 MMHG | WEIGHT: 201.94 LBS | TEMPERATURE: 98 F | BODY MASS INDEX: 28.91 KG/M2 | SYSTOLIC BLOOD PRESSURE: 149 MMHG | HEIGHT: 70 IN | HEART RATE: 63 BPM

## 2019-11-04 DIAGNOSIS — D49.89 NEOPLASM OF ABDOMEN: ICD-10-CM

## 2019-11-04 DIAGNOSIS — Z79.01 LONG TERM (CURRENT) USE OF ANTICOAGULANTS: ICD-10-CM

## 2019-11-04 DIAGNOSIS — I81 PORTAL VEIN THROMBOSIS: Primary | ICD-10-CM

## 2019-11-04 DIAGNOSIS — I81 PORTAL VEIN THROMBOSIS: ICD-10-CM

## 2019-11-04 DIAGNOSIS — R10.13 EPIGASTRIC PAIN: ICD-10-CM

## 2019-11-04 DIAGNOSIS — R16.0 LIVER MASS: ICD-10-CM

## 2019-11-04 DIAGNOSIS — R91.1 LUNG NODULE: ICD-10-CM

## 2019-11-04 LAB
INR PPP: 2.5 (ref 0.8–1.2)
PROTHROMBIN TIME: 25.4 SEC (ref 9–12.5)

## 2019-11-04 PROCEDURE — 99999 PR PBB SHADOW E&M-EST. PATIENT-LVL III: ICD-10-PCS | Mod: PBBFAC,,, | Performed by: INTERNAL MEDICINE

## 2019-11-04 PROCEDURE — 99999 PR PBB SHADOW E&M-EST. PATIENT-LVL III: CPT | Mod: PBBFAC,,, | Performed by: INTERNAL MEDICINE

## 2019-11-04 PROCEDURE — 99214 OFFICE O/P EST MOD 30 MIN: CPT | Mod: S$PBB,,, | Performed by: INTERNAL MEDICINE

## 2019-11-04 PROCEDURE — 99214 PR OFFICE/OUTPT VISIT, EST, LEVL IV, 30-39 MIN: ICD-10-PCS | Mod: S$PBB,,, | Performed by: INTERNAL MEDICINE

## 2019-11-04 PROCEDURE — 99213 OFFICE O/P EST LOW 20 MIN: CPT | Mod: PBBFAC | Performed by: INTERNAL MEDICINE

## 2019-11-04 PROCEDURE — 36415 COLL VENOUS BLD VENIPUNCTURE: CPT

## 2019-11-04 PROCEDURE — 85610 PROTHROMBIN TIME: CPT

## 2019-11-04 NOTE — PROGRESS NOTES
Patient contacted:  Left V/M - INR is therapeutic at 2.5.  Instructions given to maintain current dose of Warfarin 10 mg every Tuesday, Thursday, and Saturday; and 5 mg on all other days per week.  Recheck on 11/25/2019 (Dean Dinero).  Any questions or concerns, please contact the [facility] at [phone].

## 2019-11-04 NOTE — PROGRESS NOTES
Subjective:       Patient ID: Ottoniel Arellano Jr. is a 78 y.o. male.    Chief Complaint: Results and Coagulation Disorder (Portal vein thrombosis)    HPI 78-year-old male history of spontaneous portal vein thrombosis patient returns for review patient's last imaging performed on 10/09/2019 demonstrated decrease thrombosis and portal vein currently on warfarin managed to Coumadin clinic.  Patient is concerned over liver mass which is not increased in size biopsy failed to reveal any abnormality    Past Medical History:   Diagnosis Date    Arthritis     Coronary artery disease     Hx of heart artery stent      History reviewed. No pertinent family history.  Social History     Socioeconomic History    Marital status:      Spouse name: Not on file    Number of children: Not on file    Years of education: Not on file    Highest education level: Not on file   Occupational History    Not on file   Social Needs    Financial resource strain: Not on file    Food insecurity:     Worry: Not on file     Inability: Not on file    Transportation needs:     Medical: Not on file     Non-medical: Not on file   Tobacco Use    Smoking status: Former Smoker    Smokeless tobacco: Former User     Quit date: 5/18/1986   Substance and Sexual Activity    Alcohol use: No    Drug use: Not on file    Sexual activity: Not on file   Lifestyle    Physical activity:     Days per week: Not on file     Minutes per session: Not on file    Stress: Not on file   Relationships    Social connections:     Talks on phone: Not on file     Gets together: Not on file     Attends Congregation service: Not on file     Active member of club or organization: Not on file     Attends meetings of clubs or organizations: Not on file     Relationship status: Not on file   Other Topics Concern    Not on file   Social History Narrative    Not on file     Past Surgical History:   Procedure Laterality Date    CARDIAC SURGERY      stents 12/29/2012     EYE SURGERY      right, skin graft    SPINAL FUSION      ACDF 05/04/2010    TONSILLECTOMY         Labs:  Lab Results   Component Value Date    WBC 10.16 10/04/2019    HGB 14.0 10/04/2019    HCT 42.2 10/04/2019    MCV 89 10/04/2019     10/04/2019     BMP  Lab Results   Component Value Date     10/04/2019    K 5.0 10/04/2019     10/04/2019    CO2 27 10/04/2019    BUN 8 10/04/2019    CREATININE 0.9 10/04/2019    CALCIUM 10.4 10/04/2019    ANIONGAP 10 10/04/2019    ESTGFRAFRICA >60 10/04/2019    EGFRNONAA >60 10/04/2019     Lab Results   Component Value Date    ALT 33 10/04/2019    AST 29 10/04/2019    ALKPHOS 151 (H) 10/04/2019    BILITOT 0.7 10/04/2019       No results found for: IRON, TIBC, FERRITIN, SATURATEDIRO  No results found for: [id number]  No results found for: FOLATE  No results found for: TSH      Review of Systems   Constitutional: Negative for activity change, appetite change, chills, diaphoresis, fatigue, fever and unexpected weight change.   HENT: Negative for congestion, dental problem, drooling, ear discharge, ear pain, facial swelling, hearing loss, mouth sores, nosebleeds, postnasal drip, rhinorrhea, sinus pressure, sneezing, sore throat, tinnitus, trouble swallowing and voice change.    Eyes: Negative for photophobia, pain, discharge, redness, itching and visual disturbance.   Respiratory: Negative for apnea, cough, choking, chest tightness, shortness of breath, wheezing and stridor.    Cardiovascular: Negative for chest pain, palpitations and leg swelling.   Gastrointestinal: Positive for abdominal pain. Negative for abdominal distention, anal bleeding, blood in stool, constipation, diarrhea, nausea, rectal pain and vomiting.   Endocrine: Negative for cold intolerance, heat intolerance, polydipsia, polyphagia and polyuria.   Genitourinary: Negative for decreased urine volume, difficulty urinating, discharge, dysuria, enuresis, flank pain, frequency, genital sores,  hematuria, penile pain, penile swelling, scrotal swelling, testicular pain and urgency.   Musculoskeletal: Negative for arthralgias, back pain, gait problem, joint swelling, myalgias, neck pain and neck stiffness.   Skin: Negative for color change, pallor, rash and wound.   Allergic/Immunologic: Negative for environmental allergies, food allergies and immunocompromised state.   Neurological: Negative for dizziness, tremors, seizures, syncope, facial asymmetry, speech difficulty, weakness, light-headedness, numbness and headaches.   Hematological: Negative for adenopathy. Does not bruise/bleed easily.   Psychiatric/Behavioral: Positive for dysphoric mood. Negative for agitation, behavioral problems, confusion, decreased concentration, hallucinations, self-injury, sleep disturbance and suicidal ideas. The patient is nervous/anxious. The patient is not hyperactive.        Objective:      Physical Exam   Constitutional: He is oriented to person, place, and time. He appears well-developed and well-nourished. He appears distressed.   HENT:   Head: Normocephalic.   Right Ear: External ear normal.   Left Ear: External ear normal.   Nose: Nose normal. Right sinus exhibits no maxillary sinus tenderness and no frontal sinus tenderness. Left sinus exhibits no maxillary sinus tenderness and no frontal sinus tenderness.   Mouth/Throat: Oropharynx is clear and moist. No oropharyngeal exudate.   Eyes: Pupils are equal, round, and reactive to light. EOM and lids are normal. Right eye exhibits no discharge. Left eye exhibits no discharge. Right conjunctiva is not injected. Right conjunctiva has no hemorrhage. Left conjunctiva is not injected. Left conjunctiva has no hemorrhage. No scleral icterus. Right eye exhibits normal extraocular motion. Left eye exhibits normal extraocular motion.   Neck: Normal range of motion. Neck supple. No JVD present. No tracheal deviation present. No thyromegaly present.   Cardiovascular: Normal rate and  regular rhythm.   Pulmonary/Chest: Effort normal. No stridor. No respiratory distress.   Abdominal: Soft. He exhibits no mass. There is no hepatosplenomegaly, splenomegaly or hepatomegaly. There is no tenderness.   Musculoskeletal: Normal range of motion. He exhibits no edema or tenderness.   Lymphadenopathy:        Head (right side): No posterior auricular and no occipital adenopathy present.        Head (left side): No posterior auricular and no occipital adenopathy present.     He has no cervical adenopathy.        Right cervical: No superficial cervical, no deep cervical and no posterior cervical adenopathy present.       Left cervical: No superficial cervical, no deep cervical and no posterior cervical adenopathy present.     He has no axillary adenopathy.        Right: No supraclavicular adenopathy present.        Left: No supraclavicular adenopathy present.   Neurological: He is alert and oriented to person, place, and time. He has normal strength. No cranial nerve deficit. Coordination normal.   Skin: Skin is dry. No rash noted. He is not diaphoretic. No cyanosis or erythema. Nails show no clubbing.   Psychiatric: His behavior is normal. Judgment and thought content normal. His mood appears anxious. Cognition and memory are normal.   Vitals reviewed.          Assessment:      1. Portal vein thrombosis    2. Neoplasm of abdomen    3. Liver mass    4. Lung nodule    5. Epigastric pain           Plan:     Portal vein thrombosis resolving with warfarin therapy at this point will have continued follow-up and I will see back in 4-6 weeks with repeat CT chest abdomen pelvis follow-up of pulmonary nodules as well as liver mass.  Continued resolution of portal vein thrombosis continue with current treatment recommendations of warfarin managed on [facility] discussed implications with patient and wife        Jarod Kevin Jr, MD FACP

## 2019-11-25 ENCOUNTER — IMMUNIZATION (OUTPATIENT)
Dept: INTERNAL MEDICINE | Facility: CLINIC | Age: 79
End: 2019-11-25
Payer: MEDICARE

## 2019-11-25 PROCEDURE — 99999 PR PBB SHADOW E&M-EST. PATIENT-LVL II: CPT | Mod: PBBFAC,,,

## 2019-11-25 PROCEDURE — 99999 PR PBB SHADOW E&M-EST. PATIENT-LVL II: ICD-10-PCS | Mod: PBBFAC,,,

## 2019-11-25 PROCEDURE — 99212 OFFICE O/P EST SF 10 MIN: CPT | Mod: PBBFAC,PN

## 2019-11-25 PROCEDURE — 90662 IIV NO PRSV INCREASED AG IM: CPT | Mod: PBBFAC,PN

## 2019-11-26 ENCOUNTER — ANTI-COAG VISIT (OUTPATIENT)
Dept: CARDIOLOGY | Facility: CLINIC | Age: 79
End: 2019-11-26
Payer: MEDICARE

## 2019-11-26 DIAGNOSIS — Z79.01 LONG TERM (CURRENT) USE OF ANTICOAGULANTS: ICD-10-CM

## 2019-11-26 DIAGNOSIS — I81 PORTAL VEIN THROMBOSIS: ICD-10-CM

## 2019-11-26 PROCEDURE — 93793 PR ANTICOAGULANT MGMT FOR PT TAKING WARFARIN: ICD-10-PCS | Mod: ,,,

## 2019-11-26 PROCEDURE — 93793 ANTICOAG MGMT PT WARFARIN: CPT | Mod: ,,,

## 2019-11-26 NOTE — PROGRESS NOTES
Patient contacted:  INR is therapeutic at 2.7.  Reports no recent changes.  Instructions given to maintain current dose of Warfarin 10 mg every Tuesday, Thursday, and Saturday; and 5 mg on all other days per week.  Recheck on 12/17/2019 (Lab, along with other labs).  Patient repeated back instructions and voiced understanding.

## 2019-12-16 ENCOUNTER — TELEPHONE (OUTPATIENT)
Dept: RADIOLOGY | Facility: HOSPITAL | Age: 79
End: 2019-12-16

## 2019-12-17 ENCOUNTER — OFFICE VISIT (OUTPATIENT)
Dept: HEMATOLOGY/ONCOLOGY | Facility: CLINIC | Age: 79
End: 2019-12-17
Payer: MEDICARE

## 2019-12-17 ENCOUNTER — HOSPITAL ENCOUNTER (OUTPATIENT)
Dept: RADIOLOGY | Facility: HOSPITAL | Age: 79
Discharge: HOME | End: 2019-12-17
Attending: INTERNAL MEDICINE
Payer: MEDICARE

## 2019-12-17 ENCOUNTER — ANTI-COAG VISIT (OUTPATIENT)
Dept: CARDIOLOGY | Facility: CLINIC | Age: 79
End: 2019-12-17
Payer: MEDICARE

## 2019-12-17 VITALS
OXYGEN SATURATION: 99 % | HEART RATE: 64 BPM | BODY MASS INDEX: 28.66 KG/M2 | SYSTOLIC BLOOD PRESSURE: 174 MMHG | DIASTOLIC BLOOD PRESSURE: 76 MMHG | HEIGHT: 70 IN | WEIGHT: 200.19 LBS | TEMPERATURE: 97 F

## 2019-12-17 DIAGNOSIS — R16.0 LIVER MASS: ICD-10-CM

## 2019-12-17 DIAGNOSIS — D49.89 NEOPLASM OF ABDOMEN: ICD-10-CM

## 2019-12-17 DIAGNOSIS — R10.13 EPIGASTRIC PAIN: ICD-10-CM

## 2019-12-17 DIAGNOSIS — I81 PORTAL VEIN THROMBOSIS: ICD-10-CM

## 2019-12-17 DIAGNOSIS — I81 PORTAL VEIN THROMBOSIS: Primary | ICD-10-CM

## 2019-12-17 DIAGNOSIS — Z79.01 LONG TERM (CURRENT) USE OF ANTICOAGULANTS: ICD-10-CM

## 2019-12-17 DIAGNOSIS — R97.8 OTHER ABNORMAL TUMOR MARKERS: ICD-10-CM

## 2019-12-17 DIAGNOSIS — R91.1 LUNG NODULE: ICD-10-CM

## 2019-12-17 PROCEDURE — 99213 OFFICE O/P EST LOW 20 MIN: CPT | Mod: PBBFAC,25 | Performed by: INTERNAL MEDICINE

## 2019-12-17 PROCEDURE — 1125F PR PAIN SEVERITY QUANTIFIED, PAIN PRESENT: ICD-10-PCS | Mod: ,,, | Performed by: INTERNAL MEDICINE

## 2019-12-17 PROCEDURE — 99214 PR OFFICE/OUTPT VISIT, EST, LEVL IV, 30-39 MIN: ICD-10-PCS | Mod: 25,S$PBB,, | Performed by: INTERNAL MEDICINE

## 2019-12-17 PROCEDURE — 1125F AMNT PAIN NOTED PAIN PRSNT: CPT | Mod: ,,, | Performed by: INTERNAL MEDICINE

## 2019-12-17 PROCEDURE — 1159F PR MEDICATION LIST DOCUMENTED IN MEDICAL RECORD: ICD-10-PCS | Mod: ,,, | Performed by: INTERNAL MEDICINE

## 2019-12-17 PROCEDURE — 71260 CT THORAX DX C+: CPT | Mod: TC

## 2019-12-17 PROCEDURE — 99214 OFFICE O/P EST MOD 30 MIN: CPT | Mod: 25,S$PBB,, | Performed by: INTERNAL MEDICINE

## 2019-12-17 PROCEDURE — 25500020 PHARM REV CODE 255: Performed by: INTERNAL MEDICINE

## 2019-12-17 PROCEDURE — 74177 CT ABD & PELVIS W/CONTRAST: CPT | Mod: TC

## 2019-12-17 PROCEDURE — 99999 PR PBB SHADOW E&M-EST. PATIENT-LVL III: ICD-10-PCS | Mod: PBBFAC,,, | Performed by: INTERNAL MEDICINE

## 2019-12-17 PROCEDURE — 1159F MED LIST DOCD IN RCRD: CPT | Mod: ,,, | Performed by: INTERNAL MEDICINE

## 2019-12-17 PROCEDURE — 99999 PR PBB SHADOW E&M-EST. PATIENT-LVL III: CPT | Mod: PBBFAC,,, | Performed by: INTERNAL MEDICINE

## 2019-12-17 RX ORDER — OXYCODONE AND ACETAMINOPHEN 5; 325 MG/1; MG/1
1 TABLET ORAL EVERY 6 HOURS PRN
Qty: 40 TABLET | Refills: 0 | Status: SHIPPED | OUTPATIENT
Start: 2019-12-17 | End: 2019-12-23

## 2019-12-17 RX ADMIN — IOHEXOL 30 ML: 350 INJECTION, SOLUTION INTRAVENOUS at 08:12

## 2019-12-17 RX ADMIN — IOHEXOL 100 ML: 350 INJECTION, SOLUTION INTRAVENOUS at 08:12

## 2019-12-17 NOTE — PROGRESS NOTES
Subjective:       Patient ID: Ottoniel Arellano Jr. is a 78 y.o. male.    Chief Complaint: Results and Coagulation Disorder    HPI 78-year-old male returns with repeat CT chest pelvis for review history of portal vein thrombosis currently on warfarin persistent abdominal discomfort.    Past Medical History:   Diagnosis Date    Arthritis     Coronary artery disease     Hx of heart artery stent      History reviewed. No pertinent family history.  Social History     Socioeconomic History    Marital status:      Spouse name: Not on file    Number of children: Not on file    Years of education: Not on file    Highest education level: Not on file   Occupational History    Not on file   Social Needs    Financial resource strain: Not on file    Food insecurity:     Worry: Not on file     Inability: Not on file    Transportation needs:     Medical: Not on file     Non-medical: Not on file   Tobacco Use    Smoking status: Former Smoker    Smokeless tobacco: Former User     Quit date: 5/18/1986   Substance and Sexual Activity    Alcohol use: No    Drug use: Not on file    Sexual activity: Not on file   Lifestyle    Physical activity:     Days per week: Not on file     Minutes per session: Not on file    Stress: Not on file   Relationships    Social connections:     Talks on phone: Not on file     Gets together: Not on file     Attends Restorationism service: Not on file     Active member of club or organization: Not on file     Attends meetings of clubs or organizations: Not on file     Relationship status: Not on file   Other Topics Concern    Not on file   Social History Narrative    Not on file     Past Surgical History:   Procedure Laterality Date    CARDIAC SURGERY      stents 12/29/2012    EYE SURGERY      right, skin graft    SPINAL FUSION      ACDF 05/04/2010    TONSILLECTOMY         Labs:  Lab Results   Component Value Date    WBC 8.66 12/17/2019    HGB 12.6 (L) 12/17/2019    HCT 39.1 (L)  12/17/2019    MCV 89 12/17/2019     12/17/2019     BMP  Lab Results   Component Value Date     12/17/2019    K 4.3 12/17/2019    CL 99 12/17/2019    CO2 26 12/17/2019    BUN 9 12/17/2019    CREATININE 0.8 12/17/2019    CREATININE 0.8 12/17/2019    CREATININE 0.8 12/17/2019    CALCIUM 10.1 12/17/2019    ANIONGAP 12 12/17/2019    ESTGFRAFRICA >60 12/17/2019    ESTGFRAFRICA >60 12/17/2019    ESTGFRAFRICA >60 12/17/2019    EGFRNONAA >60 12/17/2019    EGFRNONAA >60 12/17/2019    EGFRNONAA >60 12/17/2019     Lab Results   Component Value Date    ALT 40 12/17/2019    AST 65 (H) 12/17/2019    ALKPHOS 209 (H) 12/17/2019    BILITOT 0.8 12/17/2019       No results found for: IRON, TIBC, FERRITIN, SATURATEDIRO  No results found for: [id number]  No results found for: FOLATE  No results found for: TSH      Review of Systems   Constitutional: Negative for activity change, appetite change, chills, diaphoresis, fatigue, fever and unexpected weight change.   HENT: Negative for congestion, dental problem, drooling, ear discharge, ear pain, facial swelling, hearing loss, mouth sores, nosebleeds, postnasal drip, rhinorrhea, sinus pressure, sneezing, sore throat, tinnitus, trouble swallowing and voice change.    Eyes: Negative for photophobia, pain, discharge, redness, itching and visual disturbance.   Respiratory: Negative for apnea, cough, choking, chest tightness, shortness of breath, wheezing and stridor.    Cardiovascular: Negative for chest pain, palpitations and leg swelling.   Gastrointestinal: Positive for abdominal pain. Negative for abdominal distention, anal bleeding, blood in stool, constipation, diarrhea, nausea, rectal pain and vomiting.   Endocrine: Negative for cold intolerance, heat intolerance, polydipsia, polyphagia and polyuria.   Genitourinary: Negative for decreased urine volume, difficulty urinating, discharge, dysuria, enuresis, flank pain, frequency, genital sores, hematuria, penile pain, penile  swelling, scrotal swelling, testicular pain and urgency.   Musculoskeletal: Negative for arthralgias, back pain, gait problem, joint swelling, myalgias, neck pain and neck stiffness.   Skin: Negative for color change, pallor, rash and wound.   Allergic/Immunologic: Negative for environmental allergies, food allergies and immunocompromised state.   Neurological: Negative for dizziness, tremors, seizures, syncope, facial asymmetry, speech difficulty, weakness, light-headedness, numbness and headaches.   Hematological: Negative for adenopathy. Does not bruise/bleed easily.   Psychiatric/Behavioral: Positive for dysphoric mood. Negative for agitation, behavioral problems, confusion, decreased concentration, hallucinations, self-injury, sleep disturbance and suicidal ideas. The patient is nervous/anxious. The patient is not hyperactive.        Objective:      Physical Exam   Constitutional: He is oriented to person, place, and time. He appears well-developed and well-nourished. He appears distressed.   HENT:   Head: Normocephalic.   Right Ear: External ear normal.   Left Ear: External ear normal.   Nose: Nose normal. Right sinus exhibits no maxillary sinus tenderness and no frontal sinus tenderness. Left sinus exhibits no maxillary sinus tenderness and no frontal sinus tenderness.   Mouth/Throat: Oropharynx is clear and moist. No oropharyngeal exudate.   Eyes: Pupils are equal, round, and reactive to light. EOM and lids are normal. Right eye exhibits no discharge. Left eye exhibits no discharge. Right conjunctiva is not injected. Right conjunctiva has no hemorrhage. Left conjunctiva is not injected. Left conjunctiva has no hemorrhage. No scleral icterus. Right eye exhibits normal extraocular motion. Left eye exhibits normal extraocular motion.   Neck: Normal range of motion. Neck supple. No JVD present. No tracheal deviation present. No thyromegaly present.   Cardiovascular: Normal rate and regular rhythm.    Pulmonary/Chest: Effort normal. No stridor. No respiratory distress.   Abdominal: Soft. He exhibits no mass. There is no hepatosplenomegaly, splenomegaly or hepatomegaly. There is no tenderness.   Musculoskeletal: Normal range of motion. He exhibits no edema or tenderness.   Lymphadenopathy:        Head (right side): No posterior auricular and no occipital adenopathy present.        Head (left side): No posterior auricular and no occipital adenopathy present.     He has no cervical adenopathy.        Right cervical: No superficial cervical, no deep cervical and no posterior cervical adenopathy present.       Left cervical: No superficial cervical, no deep cervical and no posterior cervical adenopathy present.     He has no axillary adenopathy.        Right: No supraclavicular adenopathy present.        Left: No supraclavicular adenopathy present.   Neurological: He is alert and oriented to person, place, and time. He has normal strength. No cranial nerve deficit. Coordination normal.   Skin: Skin is dry. No rash noted. He is not diaphoretic. No cyanosis or erythema. Nails show no clubbing.   Psychiatric: He has a normal mood and affect. His behavior is normal. Judgment and thought content normal. Cognition and memory are normal.   Vitals reviewed.          Assessment:      1. Portal vein thrombosis    2. Liver mass    3. Epigastric pain    4. Neoplasm of abdomen    5. Other abnormal tumor markers            Plan:     Continue on warfarin as outlined for portal vein thrombosis CT chest abdomen pelvis reveals no significant change in abdominal mass which has been biopsied before pulmonary nodules are stable.  Alpha fetoprotein pending will communicate results through patient portal to family otherwise follow-up in 3 months for review this point do not feel repeat biopsy is warranted 25 min face-to-face time coordination care continue with warfarin follow-up and warfarin clinic with 50% time face-to-face with patient  family        Jarod Kevin Jr, MD FACP

## 2019-12-17 NOTE — PROGRESS NOTES
Patient's INR is supra-therapeutic at 3.5.  Patient contacted.  No significant changes or extra doses reported.  No signs of bleeding noted; advised patient to seek immediate medical attention if he notices any abnormal bleeding.  Instructions given for patient to hold dose of warfarin on today; then resume current dose of warfarin 10mg on Tuesdays, Thursdays and Saturdays; and 5mg on all other days of the week.  Patient repeated directions and voiced understanding.  Recheck in 2 weeks.

## 2019-12-18 ENCOUNTER — DOCUMENTATION ONLY (OUTPATIENT)
Dept: HEMATOLOGY/ONCOLOGY | Facility: CLINIC | Age: 79
End: 2019-12-18

## 2019-12-18 DIAGNOSIS — R16.0 LIVER MASS: Primary | ICD-10-CM

## 2019-12-18 NOTE — NURSING
Spoke with patient over the phone this am regarding an in basket message received from Dr. Kevin.  Informed patient that Dr. Kevin had received the pending lab results back and wanted the patient and his wife to come back in to discuss plans going forward regarding lab results.  The patient requested to come in on 12/23/19 to see Dr. Kevin so that his wife could be with him.  He is agreeable to date time and location of appt.  He has my direct contact information should he need to reach out to me with questions.   Oncology Navigation   Intake  MD Assigned: griselda  Initial Nurse Navigator Contact: 12/18/19  Contact Method: Pool basket  Date Worked: 12/18/19  Reason if booked > 7 days after scheduling: Other  Other reason booked > 7 days: MD request to have patient back to discuss possible biopsy  Reason for Treatment Delay: Further work-up  Further Work-Up: biopsy     Treatment  Current Status: Staging work-up    Procedures: Biopsy       Support Systems: Spouse/significant other  Barriers of Care: Comorbidities  Comorbidities: blood thinners     Acuity  Treatment Tolerability: Has not started treatment yet/treatment fully completed and side effects resolved  Comorbidities in Medical History: 2   Needed: 0  Verbalizes the need for more education: 1  Navigation Acuity: 2     Follow Up  Follow up in about 5 days (around 12/23/2019).

## 2019-12-23 ENCOUNTER — DOCUMENTATION ONLY (OUTPATIENT)
Dept: HEMATOLOGY/ONCOLOGY | Facility: CLINIC | Age: 79
End: 2019-12-23

## 2019-12-23 ENCOUNTER — TELEPHONE (OUTPATIENT)
Dept: TRANSPLANT | Facility: CLINIC | Age: 79
End: 2019-12-23

## 2019-12-23 ENCOUNTER — OFFICE VISIT (OUTPATIENT)
Dept: HEMATOLOGY/ONCOLOGY | Facility: CLINIC | Age: 79
End: 2019-12-23
Payer: MEDICARE

## 2019-12-23 ENCOUNTER — TELEPHONE (OUTPATIENT)
Dept: HEMATOLOGY/ONCOLOGY | Facility: CLINIC | Age: 79
End: 2019-12-23

## 2019-12-23 VITALS
WEIGHT: 196.44 LBS | HEIGHT: 70 IN | BODY MASS INDEX: 28.12 KG/M2 | SYSTOLIC BLOOD PRESSURE: 156 MMHG | HEART RATE: 61 BPM | DIASTOLIC BLOOD PRESSURE: 77 MMHG | OXYGEN SATURATION: 98 % | TEMPERATURE: 98 F

## 2019-12-23 DIAGNOSIS — I81 PORTAL VEIN THROMBOSIS: ICD-10-CM

## 2019-12-23 DIAGNOSIS — R16.0 LIVER MASS: ICD-10-CM

## 2019-12-23 DIAGNOSIS — R16.0 LIVER MASS: Primary | ICD-10-CM

## 2019-12-23 PROCEDURE — 99213 OFFICE O/P EST LOW 20 MIN: CPT | Mod: PBBFAC | Performed by: INTERNAL MEDICINE

## 2019-12-23 PROCEDURE — 99214 PR OFFICE/OUTPT VISIT, EST, LEVL IV, 30-39 MIN: ICD-10-PCS | Mod: S$PBB,,, | Performed by: INTERNAL MEDICINE

## 2019-12-23 PROCEDURE — 1159F MED LIST DOCD IN RCRD: CPT | Mod: ,,, | Performed by: INTERNAL MEDICINE

## 2019-12-23 PROCEDURE — 1159F PR MEDICATION LIST DOCUMENTED IN MEDICAL RECORD: ICD-10-PCS | Mod: ,,, | Performed by: INTERNAL MEDICINE

## 2019-12-23 PROCEDURE — 99214 OFFICE O/P EST MOD 30 MIN: CPT | Mod: S$PBB,,, | Performed by: INTERNAL MEDICINE

## 2019-12-23 PROCEDURE — 1125F AMNT PAIN NOTED PAIN PRSNT: CPT | Mod: ,,, | Performed by: INTERNAL MEDICINE

## 2019-12-23 PROCEDURE — 99999 PR PBB SHADOW E&M-EST. PATIENT-LVL III: CPT | Mod: PBBFAC,,, | Performed by: INTERNAL MEDICINE

## 2019-12-23 PROCEDURE — 99999 PR PBB SHADOW E&M-EST. PATIENT-LVL III: ICD-10-PCS | Mod: PBBFAC,,, | Performed by: INTERNAL MEDICINE

## 2019-12-23 PROCEDURE — 1125F PR PAIN SEVERITY QUANTIFIED, PAIN PRESENT: ICD-10-PCS | Mod: ,,, | Performed by: INTERNAL MEDICINE

## 2019-12-23 RX ORDER — OXYCODONE HYDROCHLORIDE 5 MG/1
5 TABLET ORAL EVERY 4 HOURS PRN
Qty: 60 TABLET | Refills: 0 | Status: SHIPPED | OUTPATIENT
Start: 2019-12-23 | End: 2019-12-23 | Stop reason: SDUPTHER

## 2019-12-23 RX ORDER — OXYCODONE HYDROCHLORIDE 5 MG/1
5 TABLET ORAL EVERY 4 HOURS PRN
Qty: 60 TABLET | Refills: 0 | Status: SHIPPED | OUTPATIENT
Start: 2019-12-23 | End: 2020-01-23 | Stop reason: SDUPTHER

## 2019-12-23 NOTE — TELEPHONE ENCOUNTER
Patient: Ottoniel Arellano Jr.       MRN: [id number]      : 1940     Age: 79 y.o.  [address]  [place] [address]    Provider: Hepatologist Debra Camacho    Urgency of review: non-urgent    Patient Transplant Status: Not a candidate    Reason for presentation: Indeterminate lesion    Clinical Summary:  I have not seen the patient yet in clinic.  He has been seen by an oncologist.  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  He had a biopsy that was indeterminate.    Imaging to be reviewed: July and December CT    HCC Treatment History: none    ABO:  n/a    Platelets:   Lab Results   Component Value Date/Time     2019 08:58 AM     Creatinine:   Lab Results   Component Value Date/Time    CREATININE 0.8 2019 08:58 AM    CREATININE 0.8 2019 08:58 AM    CREATININE 0.8 2019 08:58 AM     Bilirubin:   Lab Results   Component Value Date/Time    BILITOT 0.8 2019 08:58 AM     AFP Last 3 each if available:   Lab Results   Component Value Date/Time    AFP 1012 (H) 2019 08:58 AM     (H) 10/04/2019 01:18 PM     (H) 2019 04:22 PM       MELD: MELD-Na score: 20 at 2019  8:58 AM  MELD score: 20 at 2019  8:58 AM  Calculated from:  Serum Creatinine: 0.8 mg/dL (Rounded to 1 mg/dL) at 2019  8:58 AM  Serum Sodium: 137 mmol/L at 2019  8:58 AM  Total Bilirubin: 0.8 mg/dL (Rounded to 1 mg/dL) at 2019  8:58 AM  INR(ratio): 3.5 at 2019  8:58 AM  Age: 78 years    Plan:     Follow-up Provider:

## 2019-12-23 NOTE — NURSING
Received call from IR confirming patient's liver biopsy date for 12/31/19. However after patient's meeting with Dr. Kevin it was decided that we would hold off on biopsy until patient was able to meet with Dr. Camacho and be presented at Multidisciplinary Tumor Board. Sent message to Dr. Camacho's office requesting appt ASAP. Dr. Kevin requested Tumor Board presentation, patient aware of plan going forward    Oncology Navigation   Intake  MD Assigned: griselda  Initial Nurse Navigator Contact: 12/18/19  Contact Method: Pool basket  Date Worked: 12/18/19  Reason if booked > 7 days after scheduling: Other  Other reason booked > 7 days: MD request to have patient back to discuss possible biopsy  Reason for Treatment Delay: Further work-up  Further Work-Up: biopsy     Treatment  Current Status: Staging work-up    Procedures: Biopsy  Biopsy Schedule Date: 12/31/19 (CT guided liver biopsy)       Support Systems: Spouse/significant other  Barriers of Care: Comorbidities  Comorbidities: blood thinners     Acuity  Treatment Tolerability: Has not started treatment yet/treatment fully completed and side effects resolved  Comorbidities in Medical History: 2   Needed: 0  Verbalizes the need for more education: 1  Navigation Acuity: 2     Follow Up  No follow-ups on file.

## 2019-12-23 NOTE — TELEPHONE ENCOUNTER
[facility] called and said that Roxicodone 5mg medicine could not be filled patient needs a hard copy to take to another pharmacy please

## 2019-12-23 NOTE — PROGRESS NOTES
Subjective:       Patient ID: Ottoniel Arellano Jr. is a 79 y.o. male.    Chief Complaint: Results; Coagulation Disorder; and Pain    HPI 79-year-old male with diagnosis of portal vein thrombosis currently on warfarin with mass involving liver previous CT biopsy no diagnosis but rising alpha fetoprotein patient presents for further evaluation    Past Medical History:   Diagnosis Date    Arthritis     Coronary artery disease     Hx of heart artery stent      History reviewed. No pertinent family history.  Social History     Socioeconomic History    Marital status:      Spouse name: Not on file    Number of children: Not on file    Years of education: Not on file    Highest education level: Not on file   Occupational History    Not on file   Social Needs    Financial resource strain: Not on file    Food insecurity:     Worry: Not on file     Inability: Not on file    Transportation needs:     Medical: Not on file     Non-medical: Not on file   Tobacco Use    Smoking status: Former Smoker    Smokeless tobacco: Former User     Quit date: 5/18/1986   Substance and Sexual Activity    Alcohol use: No    Drug use: Not on file    Sexual activity: Not on file   Lifestyle    Physical activity:     Days per week: Not on file     Minutes per session: Not on file    Stress: Not on file   Relationships    Social connections:     Talks on phone: Not on file     Gets together: Not on file     Attends Congregation service: Not on file     Active member of club or organization: Not on file     Attends meetings of clubs or organizations: Not on file     Relationship status: Not on file   Other Topics Concern    Not on file   Social History Narrative    Not on file     Past Surgical History:   Procedure Laterality Date    CARDIAC SURGERY      stents 12/29/2012    EYE SURGERY      right, skin graft    SPINAL FUSION      ACDF 05/04/2010    TONSILLECTOMY         Labs:  Lab Results   Component Value Date    WBC 8.66  12/17/2019    HGB 12.6 (L) 12/17/2019    HCT 39.1 (L) 12/17/2019    MCV 89 12/17/2019     12/17/2019     BMP  Lab Results   Component Value Date     12/17/2019    K 4.3 12/17/2019    CL 99 12/17/2019    CO2 26 12/17/2019    BUN 9 12/17/2019    CREATININE 0.8 12/17/2019    CREATININE 0.8 12/17/2019    CREATININE 0.8 12/17/2019    CALCIUM 10.1 12/17/2019    ANIONGAP 12 12/17/2019    ESTGFRAFRICA >60 12/17/2019    ESTGFRAFRICA >60 12/17/2019    ESTGFRAFRICA >60 12/17/2019    EGFRNONAA >60 12/17/2019    EGFRNONAA >60 12/17/2019    EGFRNONAA >60 12/17/2019     Lab Results   Component Value Date    ALT 40 12/17/2019    AST 65 (H) 12/17/2019    ALKPHOS 209 (H) 12/17/2019    BILITOT 0.8 12/17/2019       No results found for: IRON, TIBC, FERRITIN, SATURATEDIRO  No results found for: [id number]  No results found for: FOLATE  No results found for: TSH      Review of Systems   Constitutional: Negative for activity change, appetite change, chills, diaphoresis, fatigue, fever and unexpected weight change.   HENT: Negative for congestion, dental problem, drooling, ear discharge, ear pain, facial swelling, hearing loss, mouth sores, nosebleeds, postnasal drip, rhinorrhea, sinus pressure, sneezing, sore throat, tinnitus, trouble swallowing and voice change.    Eyes: Negative for photophobia, pain, discharge, redness, itching and visual disturbance.   Respiratory: Negative for apnea, cough, choking, chest tightness, shortness of breath, wheezing and stridor.    Cardiovascular: Negative for chest pain, palpitations and leg swelling.   Gastrointestinal: Positive for abdominal distention and abdominal pain. Negative for anal bleeding, blood in stool, constipation, diarrhea, nausea, rectal pain and vomiting.   Endocrine: Negative for cold intolerance, heat intolerance, polydipsia, polyphagia and polyuria.   Genitourinary: Negative for decreased urine volume, difficulty urinating, discharge, dysuria, enuresis, flank pain,  frequency, genital sores, hematuria, penile pain, penile swelling, scrotal swelling, testicular pain and urgency.   Musculoskeletal: Negative for arthralgias, back pain, gait problem, joint swelling, myalgias, neck pain and neck stiffness.   Skin: Negative for color change, pallor, rash and wound.   Allergic/Immunologic: Negative for environmental allergies, food allergies and immunocompromised state.   Neurological: Negative for dizziness, tremors, seizures, syncope, facial asymmetry, speech difficulty, weakness, light-headedness, numbness and headaches.   Hematological: Negative for adenopathy. Does not bruise/bleed easily.   Psychiatric/Behavioral: Positive for dysphoric mood. Negative for agitation, behavioral problems, confusion, decreased concentration, hallucinations, self-injury, sleep disturbance and suicidal ideas. The patient is nervous/anxious. The patient is not hyperactive.        Objective:      Physical Exam   Constitutional: He is oriented to person, place, and time. He appears well-developed and well-nourished. He appears distressed.   HENT:   Head: Normocephalic.   Right Ear: External ear normal.   Left Ear: External ear normal.   Nose: Nose normal. Right sinus exhibits no maxillary sinus tenderness and no frontal sinus tenderness. Left sinus exhibits no maxillary sinus tenderness and no frontal sinus tenderness.   Mouth/Throat: Oropharynx is clear and moist. No oropharyngeal exudate.   Eyes: Pupils are equal, round, and reactive to light. EOM and lids are normal. Right eye exhibits no discharge. Left eye exhibits no discharge. Right conjunctiva is not injected. Right conjunctiva has no hemorrhage. Left conjunctiva is not injected. Left conjunctiva has no hemorrhage. No scleral icterus. Right eye exhibits normal extraocular motion. Left eye exhibits normal extraocular motion.   Neck: Normal range of motion. Neck supple. No JVD present. No tracheal deviation present. No thyromegaly present.    Cardiovascular: Normal rate and regular rhythm.   Pulmonary/Chest: Effort normal. No stridor. No respiratory distress.   Abdominal: Soft. He exhibits no mass. There is no hepatosplenomegaly, splenomegaly or hepatomegaly. There is no tenderness.   Musculoskeletal: Normal range of motion. He exhibits no edema or tenderness.   Lymphadenopathy:        Head (right side): No posterior auricular and no occipital adenopathy present.        Head (left side): No posterior auricular and no occipital adenopathy present.     He has no cervical adenopathy.        Right cervical: No superficial cervical, no deep cervical and no posterior cervical adenopathy present.       Left cervical: No superficial cervical, no deep cervical and no posterior cervical adenopathy present.     He has no axillary adenopathy.        Right: No supraclavicular adenopathy present.        Left: No supraclavicular adenopathy present.   Neurological: He is alert and oriented to person, place, and time. He has normal strength. No cranial nerve deficit. Coordination normal.   Skin: Skin is dry. No rash noted. He is not diaphoretic. No cyanosis or erythema. Nails show no clubbing.   Psychiatric: His behavior is normal. Judgment and thought content normal. His mood appears anxious. Cognition and memory are normal. He exhibits a depressed mood.   Vitals reviewed.          Assessment:      1. Liver mass    2. Portal vein thrombosis           Plan:   Progressive increase in alpha fetoprotein previous CT biopsy no diagnosis.  At this point concerned about possible underlying hepatocellular carcinoma.  Would recommend GI evaluation through Dr. Camacho.  I have spoken to her by phone and she will place patient on liver conference.  In addition to seen patient I will see back in 2-3 weeks will cancel CT-directed biopsy for next week continue on current dosing of warfarin to be maintained in therapeutic range discussed implications with his wife answered questions  nurse navigator aware 25 min face-to-face time coordination of care greater than 50% time face-to-face with patient          Jarod Kevin Jr, MD FACP

## 2019-12-26 ENCOUNTER — DOCUMENTATION ONLY (OUTPATIENT)
Dept: HEMATOLOGY/ONCOLOGY | Facility: CLINIC | Age: 79
End: 2019-12-26

## 2019-12-27 ENCOUNTER — TELEPHONE (OUTPATIENT)
Dept: HEMATOLOGY/ONCOLOGY | Facility: CLINIC | Age: 79
End: 2019-12-27

## 2019-12-27 NOTE — NURSING
Pt called in today regarding rescheduling lab appt to [place] location as requested.  I changed this for him and also sent a message to dr cobian regarding seeing the pt sooner than February as per dr villalobos request .  I will follow up on this to get patient in sooner and notify him of new appt.  He is good with this plan and has my direct contact information should he need anything further in the meantime.   Oncology Navigation   Intake  MD Assigned: griselda  Initial Nurse Navigator Contact: 12/26/19  Contact Method: Phone  Date Worked: 12/26/19  Reason if booked > 7 days after scheduling: Other  Other reason booked > 7 days: MD request to have patient back to discuss possible biopsy  Reason for Treatment Delay: Further work-up  Further Work-Up: biopsy     Treatment  Current Status: Staging work-up    Procedures: Biopsy  Biopsy Schedule Date: 12/31/19       Support Systems: Spouse/significant other  Barriers of Care: Comorbidities  Comorbidities: blood thinners     Acuity  Treatment Tolerability: Has not started treatment yet/treatment fully completed and side effects resolved  Comorbidities in Medical History: 2   Needed: 0  Verbalizes the need for more education: 1  Navigation Acuity: 2     Follow Up  Follow up in about 1 day (around 12/27/2019).

## 2019-12-27 NOTE — TELEPHONE ENCOUNTER
Spoke with Carlotta in [facility] regarding setting up earlier appt with dr camacho than 2/5/2020.  She stated that Dr. Camacho was out until next Tuesday and that she would leave a message with her nurse Karla to call me back to arrange appt.  I will call patient when I hear from her.

## 2019-12-30 ENCOUNTER — TELEPHONE (OUTPATIENT)
Dept: HEMATOLOGY/ONCOLOGY | Facility: CLINIC | Age: 79
End: 2019-12-30

## 2019-12-30 NOTE — TELEPHONE ENCOUNTER
Called patient to let him and his wife know we were able to schedule an appt with Dr. Doug ortiz, 1/8/20 at 3pm at the [facility] location. Wife verbalized understanding of all information. Also placed appt slip in the mail per wife request

## 2019-12-31 ENCOUNTER — CONFERENCE (OUTPATIENT)
Dept: TRANSPLANT | Facility: CLINIC | Age: 79
End: 2019-12-31

## 2019-12-31 ENCOUNTER — LAB VISIT (OUTPATIENT)
Dept: LAB | Facility: HOSPITAL | Age: 79
End: 2019-12-31
Attending: NUCLEAR MEDICINE
Payer: MEDICARE

## 2019-12-31 ENCOUNTER — TELEPHONE (OUTPATIENT)
Dept: CARDIOLOGY | Facility: HOSPITAL | Age: 79
End: 2019-12-31

## 2019-12-31 DIAGNOSIS — Z79.01 LONG TERM (CURRENT) USE OF ANTICOAGULANTS: ICD-10-CM

## 2019-12-31 LAB
INR PPP: 7 (ref 0.8–1.2)
PROTHROMBIN TIME: 67.9 SEC (ref 9–12.5)

## 2019-12-31 PROCEDURE — 85610 PROTHROMBIN TIME: CPT

## 2019-12-31 PROCEDURE — 36415 COLL VENOUS BLD VENIPUNCTURE: CPT | Mod: PO

## 2019-12-31 NOTE — TELEPHONE ENCOUNTER
Patient: Ottoniel Arellano Jr.       MRN: [id number]      : 1940     Age: 79 y.o.  [address]  [place] [address]     Provider: Hepatologist Debra Camacho     Urgency of review: non-urgent     Patient Transplant Status: Not a candidate     Reason for presentation: Indeterminate lesion     Clinical Summary:  I have not seen the patient yet in clinic.  He has been seen by an oncologist.  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  He had a biopsy that was indeterminate.     Imaging to be reviewed: July and December CT     HCC Treatment History: none     ABO:  n/a     Platelets:         Lab Results   Component Value Date/Time      2019 08:58 AM      Creatinine:         Lab Results   Component Value Date/Time     CREATININE 0.8 2019 08:58 AM     CREATININE 0.8 2019 08:58 AM     CREATININE 0.8 2019 08:58 AM      Bilirubin:         Lab Results   Component Value Date/Time     BILITOT 0.8 2019 08:58 AM      AFP Last 3 each if available:         Lab Results   Component Value Date/Time     AFP 1012 (H) 2019 08:58 AM      (H) 10/04/2019 01:18 PM      (H) 2019 04:22 PM         MELD: MELD-Na score: 20 at 2019  8:58 AM  MELD score: 20 at 2019  8:58 AM  Calculated from:  Serum Creatinine: 0.8 mg/dL (Rounded to 1 mg/dL) at 2019  8:58 AM  Serum Sodium: 137 mmol/L at 2019  8:58 AM  Total Bilirubin: 0.8 mg/dL (Rounded to 1 mg/dL) at 2019  8:58 AM  INR(ratio): 3.5 at 2019  8:58 AM  Age: 78 years     Plan: Imaging studies show a 5.2 cm mass in segment IV which shows enhancement and washout (best seen on study 19) with extensive PVTT (given density of thrombus). Overall similar dating back to July. CT chest showed multiple pulmonary nodules that will need to be followed up. Pt has normal bilirubin and would be a candidate for  Y90.    HCC by imaging criteria.    Patient scheduled for appointment with Dr Camacho 1/8/20.  Note forwarded to Dr Camacho's staff.       Follow-up Provider: Dr Camacho

## 2020-01-01 NOTE — TELEPHONE ENCOUNTER
INR 7  Called pt and hold his Coumadin now  If bleeding, advise to go to ER  Contact [facility] on 01-

## 2020-01-02 ENCOUNTER — ANTI-COAG VISIT (OUTPATIENT)
Dept: CARDIOLOGY | Facility: CLINIC | Age: 80
End: 2020-01-02
Payer: MEDICARE

## 2020-01-02 ENCOUNTER — LAB VISIT (OUTPATIENT)
Dept: LAB | Facility: HOSPITAL | Age: 80
End: 2020-01-02
Attending: NUCLEAR MEDICINE
Payer: MEDICARE

## 2020-01-02 ENCOUNTER — TELEPHONE (OUTPATIENT)
Dept: CARDIOLOGY | Facility: CLINIC | Age: 80
End: 2020-01-02

## 2020-01-02 DIAGNOSIS — Z79.01 LONG TERM (CURRENT) USE OF ANTICOAGULANTS: ICD-10-CM

## 2020-01-02 DIAGNOSIS — I81 PORTAL VEIN THROMBOSIS: ICD-10-CM

## 2020-01-02 LAB
INR PPP: 5.6 (ref 0.8–1.2)
PROTHROMBIN TIME: 54.9 SEC (ref 9–12.5)

## 2020-01-02 PROCEDURE — 93793 PR ANTICOAGULANT MGMT FOR PT TAKING WARFARIN: ICD-10-PCS | Mod: ,,,

## 2020-01-02 PROCEDURE — 93793 ANTICOAG MGMT PT WARFARIN: CPT | Mod: ,,,

## 2020-01-02 PROCEDURE — 85610 PROTHROMBIN TIME: CPT

## 2020-01-02 PROCEDURE — 36415 COLL VENOUS BLD VENIPUNCTURE: CPT | Mod: PO

## 2020-01-02 NOTE — TELEPHONE ENCOUNTER
Labs called a critical INR at 5.57 and his pt 54.9 at 2:49.    was Nancy curran with a contact number at [phone]

## 2020-01-02 NOTE — PROGRESS NOTES
1/2/20 3:57pm    Patient's INR was rechecked on today.  INR has improved but remains supra-therapeutic at 5.6 (PT 54.9).  Previous instructions followed.   No signs of bleeding noted; advised patient to seek immediate medical attention if he notices any abnormal bleeding.  Instructions given for patient to hold dose of warfarin on today and tomorrow; then take warfarin 5mg until next INR check. Patient repeated directions and voiced understanding.  Recheck INR on Monday, 1/6/20.

## 2020-01-02 NOTE — PROGRESS NOTES
Patient's INR is supra-therapeutic at 7.0.  Patient contacted.  Mr. Arellano is currently being followed by hematology and gastro for a mass on his liver.  No other changes or extra doses reported.  No signs of bleeding noted; advised patient to seek immediate medical attention if he notices any abnormal bleeding.  INR was taken after hours on 12/31.  Patient was advised by cardiology to hold dose of warfarin.  Warfarin was already taken on the 31st but patient was able to hold dose on yesterday.  Patient advised to eat a serving of cabbage on today and continue to hold warfarin until instructed otherwise.  Patient voiced understanding.  Recheck INR on today, 1/2/20.

## 2020-01-06 ENCOUNTER — LAB VISIT (OUTPATIENT)
Dept: LAB | Facility: HOSPITAL | Age: 80
End: 2020-01-06
Attending: NUCLEAR MEDICINE
Payer: MEDICARE

## 2020-01-06 DIAGNOSIS — Z79.01 LONG TERM (CURRENT) USE OF ANTICOAGULANTS: ICD-10-CM

## 2020-01-06 LAB
INR PPP: 2 (ref 0.8–1.2)
PROTHROMBIN TIME: 20.9 SEC (ref 9–12.5)

## 2020-01-06 PROCEDURE — 85610 PROTHROMBIN TIME: CPT

## 2020-01-06 PROCEDURE — 36415 COLL VENOUS BLD VENIPUNCTURE: CPT | Mod: PO

## 2020-01-07 ENCOUNTER — ANTI-COAG VISIT (OUTPATIENT)
Dept: CARDIOLOGY | Facility: CLINIC | Age: 80
End: 2020-01-07
Payer: MEDICARE

## 2020-01-07 DIAGNOSIS — Z79.01 LONG TERM (CURRENT) USE OF ANTICOAGULANTS: ICD-10-CM

## 2020-01-07 DIAGNOSIS — I81 PORTAL VEIN THROMBOSIS: ICD-10-CM

## 2020-01-07 PROCEDURE — 93793 PR ANTICOAGULANT MGMT FOR PT TAKING WARFARIN: ICD-10-PCS | Mod: ,,,

## 2020-01-07 PROCEDURE — 93793 ANTICOAG MGMT PT WARFARIN: CPT | Mod: ,,,

## 2020-01-07 NOTE — PROGRESS NOTES
Patient's INR is therapeutic at 2.0.  Patient contacted.  Mr. Arellano reports he held he is sure he held his dose of warfarin on last Wednesday and Thursday but may have taken 5mg on last Friday.  No other changes reported.  Instructions given for patient to lower dose of warfarin to 2.5mg on Tuesday, Thursday and Saturday; and 5mg on all other days of the week.  Patient wrote down instructions and repeated back.   Recheck INR in 1 week.  Please call should you have any questions or concerns at [phone] or [phone].

## 2020-01-08 ENCOUNTER — OFFICE VISIT (OUTPATIENT)
Dept: GASTROENTEROLOGY | Facility: CLINIC | Age: 80
End: 2020-01-08
Payer: MEDICARE

## 2020-01-08 VITALS
SYSTOLIC BLOOD PRESSURE: 154 MMHG | WEIGHT: 197.06 LBS | DIASTOLIC BLOOD PRESSURE: 82 MMHG | HEART RATE: 71 BPM | HEIGHT: 70 IN | BODY MASS INDEX: 28.21 KG/M2

## 2020-01-08 DIAGNOSIS — I81 PORTAL VEIN THROMBOSIS SECONDARY TO HCC INVASION: Primary | ICD-10-CM

## 2020-01-08 DIAGNOSIS — C22.0 PORTAL VEIN THROMBOSIS SECONDARY TO HCC INVASION: Primary | ICD-10-CM

## 2020-01-08 PROCEDURE — 1126F AMNT PAIN NOTED NONE PRSNT: CPT | Mod: ,,, | Performed by: INTERNAL MEDICINE

## 2020-01-08 PROCEDURE — 1159F MED LIST DOCD IN RCRD: CPT | Mod: ,,, | Performed by: INTERNAL MEDICINE

## 2020-01-08 PROCEDURE — 1126F PR PAIN SEVERITY QUANTIFIED, NO PAIN PRESENT: ICD-10-PCS | Mod: ,,, | Performed by: INTERNAL MEDICINE

## 2020-01-08 PROCEDURE — 1159F PR MEDICATION LIST DOCUMENTED IN MEDICAL RECORD: ICD-10-PCS | Mod: ,,, | Performed by: INTERNAL MEDICINE

## 2020-01-08 PROCEDURE — 99213 OFFICE O/P EST LOW 20 MIN: CPT | Mod: PBBFAC | Performed by: INTERNAL MEDICINE

## 2020-01-08 PROCEDURE — 99204 OFFICE O/P NEW MOD 45 MIN: CPT | Mod: S$PBB,,, | Performed by: INTERNAL MEDICINE

## 2020-01-08 PROCEDURE — 99999 PR PBB SHADOW E&M-EST. PATIENT-LVL III: ICD-10-PCS | Mod: PBBFAC,,, | Performed by: INTERNAL MEDICINE

## 2020-01-08 PROCEDURE — 99999 PR PBB SHADOW E&M-EST. PATIENT-LVL III: CPT | Mod: PBBFAC,,, | Performed by: INTERNAL MEDICINE

## 2020-01-08 PROCEDURE — 99204 PR OFFICE/OUTPT VISIT, NEW, LEVL IV, 45-59 MIN: ICD-10-PCS | Mod: S$PBB,,, | Performed by: INTERNAL MEDICINE

## 2020-01-08 NOTE — H&P (VIEW-ONLY)
Subjective:     Ottoniel Arellano Jr. is here for evaluation of Liver Mass (Referred from Dr. Jarod Kevin)      HPI  Ottoniel Arellano Jr. is here for evaluation of liver mass that has been diagnosis HCC on imaging at IR conference.  Seems the patient presented around July with a mass that was indeterminate.  He did have a biopsy which was unremarkable for malignancy but is AFP has been elevated.  Initially around 300 now greater than a 1000.  He has been having some increasing abdominal discomfort.  He notes significant weight loss as well as decreased appetite.  He denies any known history of liver disease. No known history of cirrhosis.    No evidence of liver decompensation: no ascites, confusion or GI bleeding.      AFP > 1000    IR conference 12/2019  Plan: Imaging studies show a 5.2 cm mass in segment IV which shows enhancement and washout (best seen on study 7/12/19) with extensive PVTT (given density of thrombus). Overall similar dating back to July. CT chest showed multiple pulmonary nodules that will need to be followed up. Pt has normal bilirubin and would be a candidate for Y90.     HCC by imaging criteria.    Review of Systems   Constitutional: Positive for activity change, appetite change and unexpected weight change (loss).   HENT: Negative.    Eyes: Negative.    Respiratory: Negative.    Cardiovascular: Negative.    Gastrointestinal: Positive for abdominal pain.   Genitourinary: Negative.    Musculoskeletal: Positive for arthralgias and back pain.   Skin: Negative.    Neurological: Negative.    Psychiatric/Behavioral: Negative.        Objective:     Physical Exam   Constitutional: He is oriented to person, place, and time. He appears well-developed and well-nourished. No distress.   HENT:   Head: Normocephalic and atraumatic.   Mouth/Throat: Oropharynx is clear and moist. No oropharyngeal exudate.   Eyes: Pupils are equal, round, and reactive to light. Conjunctivae are normal. Right eye exhibits no  discharge. Left eye exhibits no discharge. No scleral icterus.   Pulmonary/Chest: Effort normal and breath sounds normal. No respiratory distress. He has no wheezes.   Abdominal: Soft. He exhibits no distension. There is no tenderness.   Musculoskeletal: He exhibits no edema.   Neurological: He is alert and oriented to person, place, and time.   Psychiatric: He has a normal mood and affect. His behavior is normal.   Vitals reviewed.      MELD-Na score: 20 at 12/17/2019  8:58 AM  MELD score: 20 at 12/17/2019  8:58 AM  Calculated from:  Serum Creatinine: 0.8 mg/dL (Rounded to 1 mg/dL) at 12/17/2019  8:58 AM  Serum Sodium: 137 mmol/L at 12/17/2019  8:58 AM  Total Bilirubin: 0.8 mg/dL (Rounded to 1 mg/dL) at 12/17/2019  8:58 AM  INR(ratio): 3.5 at 12/17/2019  8:58 AM  Age: 78 years    WBC   Date Value Ref Range Status   12/17/2019 8.66 3.90 - 12.70 K/uL Final     Hemoglobin   Date Value Ref Range Status   12/17/2019 12.6 (L) 14.0 - 18.0 g/dL Final     Hematocrit   Date Value Ref Range Status   12/17/2019 39.1 (L) 40.0 - 54.0 % Final     Platelets   Date Value Ref Range Status   12/17/2019 283 150 - 350 K/uL Final     BUN, Bld   Date Value Ref Range Status   12/17/2019 9 8 - 23 mg/dL Final     Creatinine   Date Value Ref Range Status   12/17/2019 0.8 0.5 - 1.4 mg/dL Final   12/17/2019 0.8 0.5 - 1.4 mg/dL Final   12/17/2019 0.8 0.5 - 1.4 mg/dL Final     Glucose   Date Value Ref Range Status   12/17/2019 111 (H) 70 - 110 mg/dL Final     Calcium   Date Value Ref Range Status   12/17/2019 10.1 8.7 - 10.5 mg/dL Final     Sodium   Date Value Ref Range Status   12/17/2019 137 136 - 145 mmol/L Final     Potassium   Date Value Ref Range Status   12/17/2019 4.3 3.5 - 5.1 mmol/L Final     Chloride   Date Value Ref Range Status   12/17/2019 99 95 - 110 mmol/L Final     Magnesium   Date Value Ref Range Status   07/12/2019 1.8 1.6 - 2.6 mg/dL Final     AST   Date Value Ref Range Status   12/17/2019 65 (H) 10 - 40 U/L Final     ALT    Date Value Ref Range Status   12/17/2019 40 10 - 44 U/L Final     Alkaline Phosphatase   Date Value Ref Range Status   12/17/2019 209 (H) 55 - 135 U/L Final     Total Bilirubin   Date Value Ref Range Status   12/17/2019 0.8 0.1 - 1.0 mg/dL Final     Comment:     For infants and newborns, interpretation of results should be based  on gestational age, weight and in agreement with clinical  observations.  Premature Infant recommended reference ranges:  Up to 24 hours.............<8.0 mg/dL  Up to 48 hours............<12.0 mg/dL  3-5 days..................<15.0 mg/dL  6-29 days.................<15.0 mg/dL       Albumin   Date Value Ref Range Status   12/17/2019 3.5 3.5 - 5.2 g/dL Final     INR   Date Value Ref Range Status   01/06/2020 2.0 (H) 0.8 - 1.2 Final     Comment:     Coumadin Therapy:  2.0 - 3.0 for INR for all indicators except mechanical heart valves  and antiphospholipid syndromes which should use 2.5 - 3.5.           Assessment/Plan:     1. 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 is a 79 y.o. male withLiver Mass (Referred from Dr. 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  Uncertain patient's risk factor.  He has no known history of liver disease.  Based on labs there is no suggestion of cirrhosis but is possible he has underlying well-compensated cirrhosis.  At this point whether not he is cirrhotic does not affect management.  He is currently compensated and would benefit from yttrium therapy.  Concerned about overall prognosis given tumor thrombus.  Given he has tumor thrombus would recommend discontinuing anticoagulation especially because patient has limitations as relates to transportation and finances.  -extensive discussion with wife and patient regarding the above  -will proceed with yttrium therapy  -case discussed with Dr. Kevin; will stop anticoagulation given  tumor thrombus  -patient reports previously getting gas card from Oncology social worker; will see if we can help get him that card gain for transportation needs  -     Comprehensive metabolic panel; Future; Expected date: 01/08/2020  -     CBC auto differential; Future; Expected date: 01/08/2020  -     AFP tumor marker; Future; Expected date: 01/08/2020  -     Protime-INR; Future; Expected date: 01/08/2020    Return to clinic in 4 months with preclinic labs at that time he should also be due for his post yytrium imaging.      Eusebia Camacho MD

## 2020-01-08 NOTE — H&P (VIEW-ONLY)
Subjective:     Ottoniel Arellano Jr. is here for evaluation of Liver Mass (Referred from Dr. Jarod Kevin)      HPI  Ottoneil Arellano Jr. is here for evaluation of liver mass that has been diagnosis HCC on imaging at IR conference.  Seems the patient presented around July with a mass that was indeterminate.  He did have a biopsy which was unremarkable for malignancy but is AFP has been elevated.  Initially around 300 now greater than a 1000.  He has been having some increasing abdominal discomfort.  He notes significant weight loss as well as decreased appetite.  He denies any known history of liver disease. No known history of cirrhosis.    No evidence of liver decompensation: no ascites, confusion or GI bleeding.      AFP > 1000    IR conference 12/2019  Plan: Imaging studies show a 5.2 cm mass in segment IV which shows enhancement and washout (best seen on study 7/12/19) with extensive PVTT (given density of thrombus). Overall similar dating back to July. CT chest showed multiple pulmonary nodules that will need to be followed up. Pt has normal bilirubin and would be a candidate for Y90.     HCC by imaging criteria.    Review of Systems   Constitutional: Positive for activity change, appetite change and unexpected weight change (loss).   HENT: Negative.    Eyes: Negative.    Respiratory: Negative.    Cardiovascular: Negative.    Gastrointestinal: Positive for abdominal pain.   Genitourinary: Negative.    Musculoskeletal: Positive for arthralgias and back pain.   Skin: Negative.    Neurological: Negative.    Psychiatric/Behavioral: Negative.        Objective:     Physical Exam   Constitutional: He is oriented to person, place, and time. He appears well-developed and well-nourished. No distress.   HENT:   Head: Normocephalic and atraumatic.   Mouth/Throat: Oropharynx is clear and moist. No oropharyngeal exudate.   Eyes: Pupils are equal, round, and reactive to light. Conjunctivae are normal. Right eye exhibits no  discharge. Left eye exhibits no discharge. No scleral icterus.   Pulmonary/Chest: Effort normal and breath sounds normal. No respiratory distress. He has no wheezes.   Abdominal: Soft. He exhibits no distension. There is no tenderness.   Musculoskeletal: He exhibits no edema.   Neurological: He is alert and oriented to person, place, and time.   Psychiatric: He has a normal mood and affect. His behavior is normal.   Vitals reviewed.      MELD-Na score: 20 at 12/17/2019  8:58 AM  MELD score: 20 at 12/17/2019  8:58 AM  Calculated from:  Serum Creatinine: 0.8 mg/dL (Rounded to 1 mg/dL) at 12/17/2019  8:58 AM  Serum Sodium: 137 mmol/L at 12/17/2019  8:58 AM  Total Bilirubin: 0.8 mg/dL (Rounded to 1 mg/dL) at 12/17/2019  8:58 AM  INR(ratio): 3.5 at 12/17/2019  8:58 AM  Age: 78 years    WBC   Date Value Ref Range Status   12/17/2019 8.66 3.90 - 12.70 K/uL Final     Hemoglobin   Date Value Ref Range Status   12/17/2019 12.6 (L) 14.0 - 18.0 g/dL Final     Hematocrit   Date Value Ref Range Status   12/17/2019 39.1 (L) 40.0 - 54.0 % Final     Platelets   Date Value Ref Range Status   12/17/2019 283 150 - 350 K/uL Final     BUN, Bld   Date Value Ref Range Status   12/17/2019 9 8 - 23 mg/dL Final     Creatinine   Date Value Ref Range Status   12/17/2019 0.8 0.5 - 1.4 mg/dL Final   12/17/2019 0.8 0.5 - 1.4 mg/dL Final   12/17/2019 0.8 0.5 - 1.4 mg/dL Final     Glucose   Date Value Ref Range Status   12/17/2019 111 (H) 70 - 110 mg/dL Final     Calcium   Date Value Ref Range Status   12/17/2019 10.1 8.7 - 10.5 mg/dL Final     Sodium   Date Value Ref Range Status   12/17/2019 137 136 - 145 mmol/L Final     Potassium   Date Value Ref Range Status   12/17/2019 4.3 3.5 - 5.1 mmol/L Final     Chloride   Date Value Ref Range Status   12/17/2019 99 95 - 110 mmol/L Final     Magnesium   Date Value Ref Range Status   07/12/2019 1.8 1.6 - 2.6 mg/dL Final     AST   Date Value Ref Range Status   12/17/2019 65 (H) 10 - 40 U/L Final     ALT    Date Value Ref Range Status   12/17/2019 40 10 - 44 U/L Final     Alkaline Phosphatase   Date Value Ref Range Status   12/17/2019 209 (H) 55 - 135 U/L Final     Total Bilirubin   Date Value Ref Range Status   12/17/2019 0.8 0.1 - 1.0 mg/dL Final     Comment:     For infants and newborns, interpretation of results should be based  on gestational age, weight and in agreement with clinical  observations.  Premature Infant recommended reference ranges:  Up to 24 hours.............<8.0 mg/dL  Up to 48 hours............<12.0 mg/dL  3-5 days..................<15.0 mg/dL  6-29 days.................<15.0 mg/dL       Albumin   Date Value Ref Range Status   12/17/2019 3.5 3.5 - 5.2 g/dL Final     INR   Date Value Ref Range Status   01/06/2020 2.0 (H) 0.8 - 1.2 Final     Comment:     Coumadin Therapy:  2.0 - 3.0 for INR for all indicators except mechanical heart valves  and antiphospholipid syndromes which should use 2.5 - 3.5.           Assessment/Plan:     1. 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 is a 79 y.o. male withLiver Mass (Referred from Dr. 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  Uncertain patient's risk factor.  He has no known history of liver disease.  Based on labs there is no suggestion of cirrhosis but is possible he has underlying well-compensated cirrhosis.  At this point whether not he is cirrhotic does not affect management.  He is currently compensated and would benefit from yttrium therapy.  Concerned about overall prognosis given tumor thrombus.  Given he has tumor thrombus would recommend discontinuing anticoagulation especially because patient has limitations as relates to transportation and finances.  -extensive discussion with wife and patient regarding the above  -will proceed with yttrium therapy  -case discussed with Dr. Kevin; will stop anticoagulation given  tumor thrombus  -patient reports previously getting gas card from Oncology social worker; will see if we can help get him that card gain for transportation needs  -     Comprehensive metabolic panel; Future; Expected date: 01/08/2020  -     CBC auto differential; Future; Expected date: 01/08/2020  -     AFP tumor marker; Future; Expected date: 01/08/2020  -     Protime-INR; Future; Expected date: 01/08/2020    Return to clinic in 4 months with preclinic labs at that time he should also be due for his post yytrium imaging.      Eusebia Camacho MD

## 2020-01-09 ENCOUNTER — TELEPHONE (OUTPATIENT)
Dept: HEMATOLOGY/ONCOLOGY | Facility: CLINIC | Age: 80
End: 2020-01-09

## 2020-01-09 NOTE — TELEPHONE ENCOUNTER
KASI Intern attempted to contact the pt to check on him and see if there was anything that he needed from KASI at this time. The pt did not answer the phone call. KASI Intern left a message for the pt to call us back. KASI diana will f/u with the pt when the phone call is returned or at the pt's next appointment.

## 2020-01-09 NOTE — TELEPHONE ENCOUNTER
KASI Intern spoke with the pt over the phone. Pt was returning the SW Intern's phone call. The KASI Intern told the pt that SW just wanted to reach out to him and see if there was anything that we could do to support him during this time. The pt stated that the gas card SW gave him at a previous appointment helped tremendously and he would like to be able to receive more in the future as he will be [facility]ing the [facility] more often now. KASI Intern told the pt that he would be able to receive a gas card at his next appt. SW will f/u with the pt at his next appt.

## 2020-01-10 ENCOUNTER — TELEPHONE (OUTPATIENT)
Dept: HEMATOLOGY/ONCOLOGY | Facility: CLINIC | Age: 80
End: 2020-01-10

## 2020-01-10 NOTE — TELEPHONE ENCOUNTER
Called patient back regarding voice mail left this am about making sure all appts for Dr. Kevin were cancelled for January as he is following Dr cobian for now.  All appts cancelled for January with Dr. Kevin and lab to pts request .  He will call with any further needs .

## 2020-01-13 ENCOUNTER — TELEPHONE (OUTPATIENT)
Dept: GASTROENTEROLOGY | Facility: CLINIC | Age: 80
End: 2020-01-13

## 2020-01-13 NOTE — TELEPHONE ENCOUNTER
----- Message from John Tay sent at 1/13/2020 12:54 PM CST -----  Contact: self [phone]  Pt would like return call from nurse regarding Tumor marker. Please call back at [phone].  Md Richardson

## 2020-01-13 NOTE — TELEPHONE ENCOUNTER
Spoke with patient who states that he is still experiencing abdominal pain and wants to know what else can be done. Patient is scheduled for a CT scan and labs ordered by Dr. Kevin on 03/10/2020.      Patient states that abdominal pain is constant and current medications are not helping.

## 2020-01-15 ENCOUNTER — TELEPHONE (OUTPATIENT)
Dept: RADIOLOGY | Facility: HOSPITAL | Age: 80
End: 2020-01-15

## 2020-01-15 ENCOUNTER — ANTI-COAG VISIT (OUTPATIENT)
Dept: CARDIOLOGY | Facility: CLINIC | Age: 80
End: 2020-01-15

## 2020-01-15 NOTE — PROGRESS NOTES
Patient contacted regarding overdue INR results.  Per patient, warfarin has been discontinued by Dr. Camacho.  Please see gastro note dated 1/8/2020.  Patient advised to contact coumadin clinic if he needs any assistance in the future.  Anticoagulation episode resolved.

## 2020-01-16 DIAGNOSIS — K76.9 LIVER LESION: ICD-10-CM

## 2020-01-16 DIAGNOSIS — I10 ESSENTIAL HYPERTENSION: ICD-10-CM

## 2020-01-16 DIAGNOSIS — C22.0 PORTAL VEIN THROMBOSIS SECONDARY TO HCC INVASION: ICD-10-CM

## 2020-01-16 DIAGNOSIS — R16.0 LIVER MASS: Primary | ICD-10-CM

## 2020-01-16 DIAGNOSIS — I81 PORTAL VEIN THROMBOSIS SECONDARY TO HCC INVASION: ICD-10-CM

## 2020-01-21 ENCOUNTER — DOCUMENTATION ONLY (OUTPATIENT)
Dept: HEMATOLOGY/ONCOLOGY | Facility: CLINIC | Age: 80
End: 2020-01-21

## 2020-01-21 ENCOUNTER — HOSPITAL ENCOUNTER (OUTPATIENT)
Facility: HOSPITAL | Age: 80
Discharge: HOME | End: 2020-01-21
Attending: RADIOLOGY | Admitting: RADIOLOGY
Payer: MEDICARE

## 2020-01-21 ENCOUNTER — HOSPITAL ENCOUNTER (OUTPATIENT)
Dept: RADIOLOGY | Facility: HOSPITAL | Age: 80
Discharge: HOME | End: 2020-01-21
Attending: PHYSICIAN ASSISTANT
Payer: MEDICARE

## 2020-01-21 ENCOUNTER — HOSPITAL ENCOUNTER (OUTPATIENT)
Dept: RADIOLOGY | Facility: HOSPITAL | Age: 80
Discharge: HOME | End: 2020-01-21
Attending: PHYSICIAN ASSISTANT | Admitting: RADIOLOGY
Payer: MEDICARE

## 2020-01-21 VITALS
HEART RATE: 101 BPM | DIASTOLIC BLOOD PRESSURE: 77 MMHG | HEIGHT: 70 IN | WEIGHT: 185 LBS | SYSTOLIC BLOOD PRESSURE: 161 MMHG | BODY MASS INDEX: 26.48 KG/M2 | RESPIRATION RATE: 16 BRPM | TEMPERATURE: 97 F | OXYGEN SATURATION: 96 %

## 2020-01-21 DIAGNOSIS — R16.0 LIVER MASS: ICD-10-CM

## 2020-01-21 DIAGNOSIS — R16.0 LIVER MASS, LEFT LOBE: ICD-10-CM

## 2020-01-21 LAB
ALBUMIN SERPL BCP-MCNC: 3.5 G/DL (ref 3.5–5.2)
ALP SERPL-CCNC: 206 U/L (ref 55–135)
ALT SERPL W/O P-5'-P-CCNC: 21 U/L (ref 10–44)
ANION GAP SERPL CALC-SCNC: 14 MMOL/L (ref 8–16)
AST SERPL-CCNC: 46 U/L (ref 10–40)
BASOPHILS # BLD AUTO: 0.05 K/UL (ref 0–0.2)
BASOPHILS NFR BLD: 0.6 % (ref 0–1.9)
BILIRUB SERPL-MCNC: 1 MG/DL (ref 0.1–1)
BUN SERPL-MCNC: 7 MG/DL (ref 8–23)
CALCIUM SERPL-MCNC: 10.1 MG/DL (ref 8.7–10.5)
CHLORIDE SERPL-SCNC: 98 MMOL/L (ref 95–110)
CO2 SERPL-SCNC: 25 MMOL/L (ref 23–29)
CREAT SERPL-MCNC: 0.8 MG/DL (ref 0.5–1.4)
DIFFERENTIAL METHOD: ABNORMAL
EOSINOPHIL # BLD AUTO: 0 K/UL (ref 0–0.5)
EOSINOPHIL NFR BLD: 0.4 % (ref 0–8)
ERYTHROCYTE [DISTWIDTH] IN BLOOD BY AUTOMATED COUNT: 15 % (ref 11.5–14.5)
EST. GFR  (AFRICAN AMERICAN): >60 ML/MIN/1.73 M^2
EST. GFR  (NON AFRICAN AMERICAN): >60 ML/MIN/1.73 M^2
GLUCOSE SERPL-MCNC: 118 MG/DL (ref 70–110)
HCT VFR BLD AUTO: 38.1 % (ref 40–54)
HGB BLD-MCNC: 12.4 G/DL (ref 14–18)
IMM GRANULOCYTES # BLD AUTO: 0.04 K/UL (ref 0–0.04)
IMM GRANULOCYTES NFR BLD AUTO: 0.5 % (ref 0–0.5)
INR PPP: 1.1 (ref 0.8–1.2)
LYMPHOCYTES # BLD AUTO: 1.1 K/UL (ref 1–4.8)
LYMPHOCYTES NFR BLD: 13.3 % (ref 18–48)
MCH RBC QN AUTO: 28.7 PG (ref 27–31)
MCHC RBC AUTO-ENTMCNC: 32.5 G/DL (ref 32–36)
MCV RBC AUTO: 88 FL (ref 82–98)
MONOCYTES # BLD AUTO: 0.8 K/UL (ref 0.3–1)
MONOCYTES NFR BLD: 8.9 % (ref 4–15)
NEUTROPHILS # BLD AUTO: 6.4 K/UL (ref 1.8–7.7)
NEUTROPHILS NFR BLD: 76.3 % (ref 38–73)
NRBC BLD-RTO: 0 /100 WBC
PERIPHERAL STENT: YES
PLATELET # BLD AUTO: 272 K/UL (ref 150–350)
PMV BLD AUTO: 10.8 FL (ref 9.2–12.9)
POTASSIUM SERPL-SCNC: 4 MMOL/L (ref 3.5–5.1)
PROT SERPL-MCNC: 8.2 G/DL (ref 6–8.4)
PROTHROMBIN TIME: 11.3 SEC (ref 9–12.5)
RBC # BLD AUTO: 4.32 M/UL (ref 4.6–6.2)
SODIUM SERPL-SCNC: 137 MMOL/L (ref 136–145)
WBC # BLD AUTO: 8.39 K/UL (ref 3.9–12.7)

## 2020-01-21 PROCEDURE — 63600175 PHARM REV CODE 636 W HCPCS: Performed by: RADIOLOGY

## 2020-01-21 PROCEDURE — 25000003 PHARM REV CODE 250

## 2020-01-21 PROCEDURE — C1769 GUIDE WIRE: HCPCS

## 2020-01-21 PROCEDURE — 37243 VASC EMBOLIZE/OCCLUDE ORGAN: CPT

## 2020-01-21 PROCEDURE — 78201 LIVER IMAGING STATIC ONLY: CPT | Mod: TC

## 2020-01-21 PROCEDURE — 63600175 PHARM REV CODE 636 W HCPCS: Performed by: PHYSICIAN ASSISTANT

## 2020-01-21 PROCEDURE — 85025 COMPLETE CBC W/AUTO DIFF WBC: CPT

## 2020-01-21 PROCEDURE — 63600175 PHARM REV CODE 636 W HCPCS

## 2020-01-21 PROCEDURE — 25500020 PHARM REV CODE 255

## 2020-01-21 PROCEDURE — A9540 TC99M MAA: HCPCS

## 2020-01-21 PROCEDURE — 80053 COMPREHEN METABOLIC PANEL: CPT

## 2020-01-21 PROCEDURE — 85610 PROTHROMBIN TIME: CPT

## 2020-01-21 RX ORDER — CEFAZOLIN SODIUM 1 G/3ML
1 INJECTION, POWDER, FOR SOLUTION INTRAMUSCULAR; INTRAVENOUS
Status: CANCELLED | OUTPATIENT
Start: 2020-01-21

## 2020-01-21 RX ORDER — SODIUM CHLORIDE 9 MG/ML
INJECTION, SOLUTION INTRAVENOUS CONTINUOUS
Status: DISCONTINUED | OUTPATIENT
Start: 2020-01-21 | End: 2020-01-21 | Stop reason: HOSPADM

## 2020-01-21 RX ORDER — SODIUM CHLORIDE 9 MG/ML
INJECTION, SOLUTION INTRAVENOUS CONTINUOUS
Status: DISCONTINUED | OUTPATIENT
Start: 2020-01-21 | End: 2020-01-22 | Stop reason: HOSPADM

## 2020-01-21 RX ORDER — CEFAZOLIN SODIUM 2 G/50ML
2 SOLUTION INTRAVENOUS ONCE
Status: DISCONTINUED | OUTPATIENT
Start: 2020-01-21 | End: 2020-01-21

## 2020-01-21 RX ORDER — DIPHENHYDRAMINE HYDROCHLORIDE 50 MG/ML
25 INJECTION INTRAMUSCULAR; INTRAVENOUS ONCE
Status: COMPLETED | OUTPATIENT
Start: 2020-01-21 | End: 2020-01-21

## 2020-01-21 RX ADMIN — DIPHENHYDRAMINE HYDROCHLORIDE 25 MG: 50 INJECTION INTRAMUSCULAR; INTRAVENOUS at 11:01

## 2020-01-21 RX ADMIN — SODIUM CHLORIDE: 9 INJECTION, SOLUTION INTRAVENOUS at 11:01

## 2020-01-21 RX ADMIN — HYDROCORTISONE SODIUM SUCCINATE 200 MG: 100 INJECTION, POWDER, FOR SOLUTION INTRAMUSCULAR; INTRAVENOUS at 11:01

## 2020-01-21 RX ADMIN — ONDANSETRON HYDROCHLORIDE 16 MG: 2 INJECTION, SOLUTION INTRAMUSCULAR; INTRAVENOUS at 11:01

## 2020-01-21 RX ADMIN — VANCOMYCIN HYDROCHLORIDE 1 G: 1 INJECTION, POWDER, LYOPHILIZED, FOR SOLUTION INTRAVENOUS at 01:01

## 2020-01-21 NOTE — PROGRESS NOTES
Brief f/u with pt and wife. Provided him with gas card. He lives in [facility]. Looking to relocate to [place]. In the meantime SW will attempt to continue to assist with a gas card. Will plan to reassess needs/complete full assessment at an upcoming appt when more time is available.

## 2020-01-21 NOTE — DISCHARGE INSTRUCTIONS
"Post-op          DRIVING: Due to sedation you received during your procedure, DO NOT drive or operate machinery for 24 hours. Avoid making critical decisions or signing legal documents until tomorrow.    . ACTIVITY: AVOID activities that require bending of the affected arm/wrist for 3 days and submerging the site in water for 3 days. REMOVE the dressing the day after  the procedure, and shower.  Apply a bandaid to site after shower.  WEAR wrist immobilizer until tomorrow night.    You may RESUME your normal activities or prescribed exercise program as instructed by your physician after 3 days.                                                                                                                  WOUND CARE: It is not unusual to have a small amount of bruising to appear at or near the puncture site. It is also common to have a tender "knot" develop beneath the skin at the puncture site of the wrist/arm. This is usually scar tissue and is not a cause for concern or alarm. This tender knot may take several weeks to fully resolve. The bruise will usually spread over several days. However, if the lump gets bigger, call your doctor immediately.     DISCOMFORT: For general discomfort at the puncture site, you may take 1 or 2 Acetaminophen (Tylenol) tablets every 4 - 6 hours as needed. (Do not take more than 4000 mg a day)    . SIGNS AND SYMPTOMS TO REPORT:  Call your physician immediately if any of the following occur:                                            1. Loss of feeling, warmth or color to the affected arm/wrist                                                                                                          2. Mild beeding from the site                 3. Pain that is sudden, sharp or persistent in the affected arm/wrist                 4. Swelling or a change in "lump" size, increased redness or drainage at the puncture site                                                                         "       5. High fever (101 degrees or higher)    . GO TO  THE EMERGENCY ROOM OR CALL 911 IF YOU HAVE:  numbness or severe pain of limb, if your limb becomes cold or discolored or if you develop uncontrolled bleeding from the puncture site (quickly apply firm, direct pressure above the site).

## 2020-01-21 NOTE — NURSING
Vitals stable: Ambulated in room. Cardiac monitor removed. PIV removed. D/C  instructions reviewed. Pt verbalized understanding. Wheeled to car via wheelchair accompanied by nurse.

## 2020-01-21 NOTE — PROCEDURES
Radiology Post-Procedure Note    Pre Op Diagnosis: Hepatocellular carcinoma    Post Op Diagnosis: Same    Procedure: Yttrium planning    Procedure performed by: Dr Ar Ham    Written Informed Consent Obtained: Yes    Specimen Removed: No    Estimated Blood Loss: Minimal    Findings: no complications of y90 mapping.    Patient tolerated procedure well.    Ar Ham MD  Staff Radiologist  Department of Radiology  Pager: [phone]

## 2020-01-22 NOTE — DISCHARGE SUMMARY
Radiology Discharge Summary      Hospital Course: No complications    Admit Date: 1/21/2020  Discharge Date: 01/22/2020     Instructions Given to Patient: Yes  Diet: regular diet and Resume prior diet  Activity: activity as tolerated and no driving for today    Description of Condition on Discharge: Stable  Vital Signs (Most Recent): Temp: 97.2 °F (36.2 °C) (01/21/20 1051)  Pulse: 101 (01/21/20 1600)  Resp: 16 (01/21/20 1600)  BP: (!) 161/77 (01/21/20 1550)  SpO2: 96 % (01/21/20 1600)    Discharge Disposition: Home    Discharge Diagnosis: hcc     Follow-up: y90 in 1-2 weeks    Ar Ham MD  Staff Radiologist  Department of Radiology  Pager: [phone]

## 2020-01-23 ENCOUNTER — DOCUMENTATION ONLY (OUTPATIENT)
Dept: HEMATOLOGY/ONCOLOGY | Facility: CLINIC | Age: 80
End: 2020-01-23

## 2020-01-23 DIAGNOSIS — I10 ESSENTIAL HYPERTENSION: ICD-10-CM

## 2020-01-23 DIAGNOSIS — E78.49 OTHER HYPERLIPIDEMIA: ICD-10-CM

## 2020-01-23 DIAGNOSIS — C22.0 PORTAL VEIN THROMBOSIS SECONDARY TO HCC INVASION: ICD-10-CM

## 2020-01-23 DIAGNOSIS — R16.0 LIVER MASS: Primary | ICD-10-CM

## 2020-01-23 DIAGNOSIS — I81 PORTAL VEIN THROMBOSIS SECONDARY TO HCC INVASION: ICD-10-CM

## 2020-01-23 DIAGNOSIS — I25.10 CORONARY ARTERY DISEASE INVOLVING NATIVE CORONARY ARTERY OF NATIVE HEART WITHOUT ANGINA PECTORIS: ICD-10-CM

## 2020-01-23 NOTE — NURSING
pts wife left voicemail regarding the pt needing a refill on his ocycodone.  Upon researching this request it was found that the Rx has already been refilled today .  Pt informed and he is good with this.  He will call back with any further needs. Interdisciplinary Breast Cancer Conference    Ottoniel Arellano Jr.    Male                                                                     Cancer Staging  No matching staging information was found for the patient.

## 2020-02-04 ENCOUNTER — HOSPITAL ENCOUNTER (OUTPATIENT)
Dept: RADIOLOGY | Facility: HOSPITAL | Age: 80
Discharge: HOME | End: 2020-02-04
Attending: PHYSICIAN ASSISTANT | Admitting: RADIOLOGY
Payer: MEDICARE

## 2020-02-04 ENCOUNTER — DOCUMENTATION ONLY (OUTPATIENT)
Dept: HEMATOLOGY/ONCOLOGY | Facility: CLINIC | Age: 80
End: 2020-02-04

## 2020-02-04 ENCOUNTER — HOSPITAL ENCOUNTER (OUTPATIENT)
Facility: HOSPITAL | Age: 80
Discharge: HOME | End: 2020-02-05
Attending: RADIOLOGY | Admitting: INTERNAL MEDICINE
Payer: MEDICARE

## 2020-02-04 DIAGNOSIS — C22.0 HEPATOCELLULAR CARCINOMA: ICD-10-CM

## 2020-02-04 DIAGNOSIS — R16.0 LIVER MASS: ICD-10-CM

## 2020-02-04 DIAGNOSIS — K76.9 LIVER LESION: ICD-10-CM

## 2020-02-04 DIAGNOSIS — C22.0 HCC (HEPATOCELLULAR CARCINOMA): ICD-10-CM

## 2020-02-04 DIAGNOSIS — I25.10 CORONARY ARTERY DISEASE INVOLVING NATIVE CORONARY ARTERY OF NATIVE HEART WITHOUT ANGINA PECTORIS: Primary | ICD-10-CM

## 2020-02-04 DIAGNOSIS — M51.36 DDD (DEGENERATIVE DISC DISEASE), LUMBAR: ICD-10-CM

## 2020-02-04 LAB
ALBUMIN SERPL BCP-MCNC: 3.2 G/DL (ref 3.5–5.2)
ALP SERPL-CCNC: 172 U/L (ref 55–135)
ALT SERPL W/O P-5'-P-CCNC: 18 U/L (ref 10–44)
ANION GAP SERPL CALC-SCNC: 11 MMOL/L (ref 8–16)
APTT BLDCRRT: 29.9 SEC (ref 21–32)
AST SERPL-CCNC: 42 U/L (ref 10–40)
BASOPHILS # BLD AUTO: 0.05 K/UL (ref 0–0.2)
BASOPHILS NFR BLD: 0.5 % (ref 0–1.9)
BILIRUB DIRECT SERPL-MCNC: 0.4 MG/DL (ref 0.1–0.3)
BILIRUB SERPL-MCNC: 0.7 MG/DL (ref 0.1–1)
BUN SERPL-MCNC: 7 MG/DL (ref 8–23)
CALCIUM SERPL-MCNC: 9.8 MG/DL (ref 8.7–10.5)
CHLORIDE SERPL-SCNC: 100 MMOL/L (ref 95–110)
CO2 SERPL-SCNC: 27 MMOL/L (ref 23–29)
CREAT SERPL-MCNC: 0.9 MG/DL (ref 0.5–1.4)
DIFFERENTIAL METHOD: ABNORMAL
EOSINOPHIL # BLD AUTO: 0.1 K/UL (ref 0–0.5)
EOSINOPHIL NFR BLD: 0.7 % (ref 0–8)
ERYTHROCYTE [DISTWIDTH] IN BLOOD BY AUTOMATED COUNT: 15.5 % (ref 11.5–14.5)
EST. GFR  (AFRICAN AMERICAN): >60 ML/MIN/1.73 M^2
EST. GFR  (NON AFRICAN AMERICAN): >60 ML/MIN/1.73 M^2
GLUCOSE SERPL-MCNC: 109 MG/DL (ref 70–110)
HCT VFR BLD AUTO: 36 % (ref 40–54)
HGB BLD-MCNC: 11.6 G/DL (ref 14–18)
IMM GRANULOCYTES # BLD AUTO: 0.04 K/UL (ref 0–0.04)
IMM GRANULOCYTES NFR BLD AUTO: 0.4 % (ref 0–0.5)
INR PPP: 1.1 (ref 0.8–1.2)
LYMPHOCYTES # BLD AUTO: 1.2 K/UL (ref 1–4.8)
LYMPHOCYTES NFR BLD: 12.7 % (ref 18–48)
MCH RBC QN AUTO: 28 PG (ref 27–31)
MCHC RBC AUTO-ENTMCNC: 32.2 G/DL (ref 32–36)
MCV RBC AUTO: 87 FL (ref 82–98)
MONOCYTES # BLD AUTO: 0.8 K/UL (ref 0.3–1)
MONOCYTES NFR BLD: 8.7 % (ref 4–15)
NEUTROPHILS # BLD AUTO: 7.2 K/UL (ref 1.8–7.7)
NEUTROPHILS NFR BLD: 77 % (ref 38–73)
NRBC BLD-RTO: 0 /100 WBC
PLATELET # BLD AUTO: 404 K/UL (ref 150–350)
PMV BLD AUTO: 9.5 FL (ref 9.2–12.9)
POTASSIUM SERPL-SCNC: 3.9 MMOL/L (ref 3.5–5.1)
PROT SERPL-MCNC: 8 G/DL (ref 6–8.4)
PROTHROMBIN TIME: 11.5 SEC (ref 9–12.5)
RBC # BLD AUTO: 4.14 M/UL (ref 4.6–6.2)
SODIUM SERPL-SCNC: 138 MMOL/L (ref 136–145)
WBC # BLD AUTO: 9.42 K/UL (ref 3.9–12.7)

## 2020-02-04 PROCEDURE — 85730 THROMBOPLASTIN TIME PARTIAL: CPT

## 2020-02-04 PROCEDURE — 85610 PROTHROMBIN TIME: CPT

## 2020-02-04 PROCEDURE — 82248 BILIRUBIN DIRECT: CPT

## 2020-02-04 PROCEDURE — 78201 LIVER IMAGING STATIC ONLY: CPT | Mod: TC

## 2020-02-04 PROCEDURE — 63600175 PHARM REV CODE 636 W HCPCS

## 2020-02-04 PROCEDURE — 25000003 PHARM REV CODE 250

## 2020-02-04 PROCEDURE — 63600175 PHARM REV CODE 636 W HCPCS: Performed by: PHYSICIAN ASSISTANT

## 2020-02-04 PROCEDURE — C1769 GUIDE WIRE: HCPCS

## 2020-02-04 PROCEDURE — 63600175 PHARM REV CODE 636 W HCPCS: Performed by: RADIOLOGY

## 2020-02-04 PROCEDURE — 99152 MOD SED SAME PHYS/QHP 5/>YRS: CPT

## 2020-02-04 PROCEDURE — 25500020 PHARM REV CODE 255

## 2020-02-04 PROCEDURE — 80053 COMPREHEN METABOLIC PANEL: CPT

## 2020-02-04 PROCEDURE — 85025 COMPLETE CBC W/AUTO DIFF WBC: CPT

## 2020-02-04 PROCEDURE — 25000003 PHARM REV CODE 250: Performed by: PHYSICIAN ASSISTANT

## 2020-02-04 RX ORDER — ONDANSETRON 2 MG/ML
4 INJECTION INTRAMUSCULAR; INTRAVENOUS EVERY 6 HOURS PRN
Status: DISCONTINUED | OUTPATIENT
Start: 2020-02-04 | End: 2020-02-05 | Stop reason: HOSPADM

## 2020-02-04 RX ORDER — METOPROLOL TARTRATE 25 MG/1
25 TABLET, FILM COATED ORAL 2 TIMES DAILY
Status: DISCONTINUED | OUTPATIENT
Start: 2020-02-04 | End: 2020-02-05 | Stop reason: HOSPADM

## 2020-02-04 RX ORDER — SODIUM CHLORIDE 9 MG/ML
INJECTION, SOLUTION INTRAVENOUS CONTINUOUS
Status: DISCONTINUED | OUTPATIENT
Start: 2020-02-04 | End: 2020-02-05 | Stop reason: HOSPADM

## 2020-02-04 RX ORDER — LIDOCAINE HYDROCHLORIDE 10 MG/ML
1 INJECTION, SOLUTION EPIDURAL; INFILTRATION; INTRACAUDAL; PERINEURAL ONCE AS NEEDED
Status: DISCONTINUED | OUTPATIENT
Start: 2020-02-04 | End: 2020-02-05 | Stop reason: HOSPADM

## 2020-02-04 RX ORDER — OXYCODONE HYDROCHLORIDE 5 MG/1
5 TABLET ORAL EVERY 4 HOURS PRN
Status: DISCONTINUED | OUTPATIENT
Start: 2020-02-04 | End: 2020-02-05 | Stop reason: HOSPADM

## 2020-02-04 RX ORDER — PANTOPRAZOLE SODIUM 40 MG/1
40 TABLET, DELAYED RELEASE ORAL DAILY
Status: DISCONTINUED | OUTPATIENT
Start: 2020-02-05 | End: 2020-02-05 | Stop reason: HOSPADM

## 2020-02-04 RX ORDER — ACETAMINOPHEN 325 MG/1
650 TABLET ORAL EVERY 6 HOURS PRN
Status: DISCONTINUED | OUTPATIENT
Start: 2020-02-04 | End: 2020-02-05 | Stop reason: HOSPADM

## 2020-02-04 RX ORDER — ONDANSETRON 2 MG/ML
8 INJECTION INTRAMUSCULAR; INTRAVENOUS ONCE
Status: COMPLETED | OUTPATIENT
Start: 2020-02-04 | End: 2020-02-04

## 2020-02-04 RX ORDER — NAPROXEN SODIUM 220 MG/1
81 TABLET, FILM COATED ORAL DAILY
Status: DISCONTINUED | OUTPATIENT
Start: 2020-02-04 | End: 2020-02-05 | Stop reason: HOSPADM

## 2020-02-04 RX ORDER — VANCOMYCIN HCL IN 5 % DEXTROSE 1.5G/250ML
1500 PLASTIC BAG, INJECTION (ML) INTRAVENOUS
Status: COMPLETED | OUTPATIENT
Start: 2020-02-04 | End: 2020-02-04

## 2020-02-04 RX ORDER — HYDRALAZINE HYDROCHLORIDE 20 MG/ML
10 INJECTION INTRAMUSCULAR; INTRAVENOUS EVERY 6 HOURS PRN
Status: DISCONTINUED | OUTPATIENT
Start: 2020-02-04 | End: 2020-02-05 | Stop reason: HOSPADM

## 2020-02-04 RX ADMIN — ONDANSETRON 4 MG: 2 INJECTION INTRAMUSCULAR; INTRAVENOUS at 09:02

## 2020-02-04 RX ADMIN — METOPROLOL TARTRATE 25 MG: 25 TABLET ORAL at 09:02

## 2020-02-04 RX ADMIN — ONDANSETRON 4 MG: 2 INJECTION INTRAMUSCULAR; INTRAVENOUS at 03:02

## 2020-02-04 RX ADMIN — HYDRALAZINE HYDROCHLORIDE 10 MG: 20 INJECTION INTRAMUSCULAR; INTRAVENOUS at 04:02

## 2020-02-04 RX ADMIN — ONDANSETRON 8 MG: 2 INJECTION INTRAMUSCULAR; INTRAVENOUS at 09:02

## 2020-02-04 RX ADMIN — PROMETHAZINE HYDROCHLORIDE 6.25 MG: 25 INJECTION INTRAMUSCULAR; INTRAVENOUS at 06:02

## 2020-02-04 RX ADMIN — ASPIRIN 81 MG 81 MG: 81 TABLET ORAL at 03:02

## 2020-02-04 RX ADMIN — VANCOMYCIN HYDROCHLORIDE 1500 MG: 100 INJECTION, POWDER, LYOPHILIZED, FOR SOLUTION INTRAVENOUS at 09:02

## 2020-02-04 RX ADMIN — OXYCODONE HYDROCHLORIDE 5 MG: 5 TABLET ORAL at 03:02

## 2020-02-04 RX ADMIN — SODIUM CHLORIDE: 0.9 INJECTION, SOLUTION INTRAVENOUS at 03:02

## 2020-02-04 RX ADMIN — OXYCODONE HYDROCHLORIDE 5 MG: 5 TABLET ORAL at 09:02

## 2020-02-04 NOTE — ASSESSMENT & PLAN NOTE
-S/P yttrium treatment.   -Continue symptomatic care.   -Monitor LFTs.   -Plan to discharge with Cipro BID X 10 days.

## 2020-02-04 NOTE — HPI
Ottoniel Arellano is an 79 year old male with CAD and arthritis who presented for yttrium treatment of his HCC performed by Dr. Ham today. He reports chronic diffuse abdominal pain, nausea, decreased appetite and weight loss, but denies other symptoms including fever and chills. He denies any change in chronic symptoms following the procedure. Of note, it appears the patient has been unable to take his oral medications due his chronic symptoms. Code status was discussed with the patient. He is a full code. His wife, Maddy Arellano, is his surrogate medical decision maker.

## 2020-02-04 NOTE — SUBJECTIVE & OBJECTIVE
Past Medical History:   Diagnosis Date    Arthritis     Coronary artery disease     HCC (hepatocellular carcinoma)        Past Surgical History:   Procedure Laterality Date    ANGIOGRAM, CORONARY, WITH LEFT HEART CATHETERIZATION      stents 12/29/2012    EYE SURGERY      right, skin graft    FLUOROSCOPY N/A 1/21/2020    Procedure: Y90 Mapping;  Surgeon: Ar Ham MD;  Location: [facility] CATH LAB;  Service: General;  Laterality: N/A;    SPINAL FUSION      ACDF 05/04/2010    TONSILLECTOMY         Review of patient's allergies indicates:   Allergen Reactions    Penicillins Hives and Rash    Shellfish containing products      Pt states face and hands turn blotchy red and itch       No current facility-administered medications on file prior to encounter.      Current Outpatient Medications on File Prior to Encounter   Medication Sig    ascorbic acid (VITAMIN C) 500 MG tablet Take 500 mg by mouth once daily.    aspirin 81 MG Chew Take 81 mg by mouth once daily.    atorvastatin (LIPITOR) 40 MG tablet Take 40 mg by mouth once daily.     cinnamon bark 500 mg capsule Take 1,000 mg by mouth once daily.    clonazePAM (KLONOPIN) 1 MG tablet Take 1 tablet (1 mg total) by mouth On call Procedure for Anxiety. Take 1 tablet 1 hr before the MRI procedure    diphenhydrAMINE (BENADRYL) 25 mg capsule Take 50 mg by mouth nightly as needed for Itching.    fish oil-omega-3 fatty acids 300-1,000 mg capsule Take 2 g by mouth once daily.    fluticasone propionate (FLONASE ALLERGY RELIEF) 50 mcg/actuation nasal spray 1 spray by Each Nare route once daily.    meloxicam (MOBIC) 15 MG tablet Take 15 mg by mouth daily as needed for Pain.    metoprolol succinate (TOPROL-XL) 25 MG 24 hr tablet Take 25 mg by mouth once daily.    metoprolol tartrate (LOPRESSOR) 25 MG tablet     NEXIUM 40 mg capsule Take 40 mg by mouth daily as needed.     ondansetron (ZOFRAN-ODT) 4 MG TbDL Take 1 tablet (4 mg total) by mouth every 8 (eight)  hours as needed.    oxyCODONE (ROXICODONE) 5 MG immediate release tablet Take 1 tablet (5 mg total) by mouth every 4 (four) hours as needed for Pain. Medically necessary for more than 7 days    promethazine (PHENERGAN) 12.5 MG Tab Take 1 tablet (12.5 mg total) by mouth every 6 to 8 hours as needed.    tamsulosin (FLOMAX) 0.4 mg Cp24 Take 0.4 mg by mouth every evening.     vitamin D (VITAMIN D3) 1000 units Tab Take 1,000 Units by mouth.    vitamin E 400 unit Tab Take 1 tablet by mouth once daily.    warfarin (COUMADIN) 5 MG tablet Take 2 tablets by mouth on Tuesdays and Thursdays; and 1 tablet on all other days of the week. (Patient taking differently: Take 1/2 tablet by mouth on Tuesdays, Thursdays and Saturdays; and 1 tablet on all other days of the week.)     Family History     None        Tobacco Use    Smoking status: Former Smoker    Smokeless tobacco: Former User     Quit date: 5/18/1986   Substance and Sexual Activity    Alcohol use: No    Drug use: Not on file    Sexual activity: Not on file     Review of Systems   Constitutional: Positive for appetite change (decreased) and unexpected weight change (loss). Negative for chills, diaphoresis, fatigue and fever.   HENT: Negative for congestion, ear pain, mouth sores, sore throat and trouble swallowing.    Eyes: Negative for pain and visual disturbance.   Respiratory: Negative for cough, chest tightness and shortness of breath.    Cardiovascular: Negative for chest pain, palpitations and leg swelling.   Gastrointestinal: Positive for abdominal pain and nausea. Negative for constipation and diarrhea.   Endocrine: Negative for cold intolerance, heat intolerance, polydipsia and polyuria.   Genitourinary: Negative for dysuria, frequency and hematuria.   Musculoskeletal: Negative for arthralgias, back pain, myalgias and neck pain.   Skin: Negative for pallor, rash and wound.   Allergic/Immunologic: Negative for environmental allergies and immunocompromised  state.   Neurological: Negative for dizziness, seizures, syncope, weakness, numbness and headaches.   Hematological: Negative for adenopathy. Does not bruise/bleed easily.   Psychiatric/Behavioral: Negative for agitation, confusion and sleep disturbance.     Objective:     Vital Signs (Most Recent):  Temp: 97 °F (36.1 °C) (02/04/20 1230)  Pulse: 73 (02/04/20 1400)  Resp: 14 (02/04/20 1400)  BP: (!) 137/57 (02/04/20 1400)  SpO2: 97 % (02/04/20 1400) Vital Signs (24h Range):  Temp:  [97 °F (36.1 °C)-98.2 °F (36.8 °C)] 97 °F (36.1 °C)  Pulse:  [65-79] 73  Resp:  [14-18] 14  SpO2:  [95 %-98 %] 97 %  BP: (137-164)/(53-83) 137/57     Weight: 89.4 kg (197 lb)  Body mass index is 28.27 kg/m².    Physical Exam   Constitutional: He is oriented to person, place, and time. He appears well-developed and well-nourished.   HENT:   Head: Normocephalic and atraumatic.   Eyes: Conjunctivae are normal.   Neck: Neck supple. No JVD present.   Cardiovascular: Normal rate, regular rhythm and normal heart sounds.   Pulmonary/Chest: Effort normal and breath sounds normal. He has no wheezes.   Abdominal: Soft. Bowel sounds are normal. He exhibits no distension. There is tenderness (diffuse).   Musculoskeletal: Normal range of motion.   Neurological: He is alert and oriented to person, place, and time.   Skin: Skin is warm and dry. No rash noted.   Psychiatric: He has a normal mood and affect. His behavior is normal. Thought content normal.   Nursing note and vitals reviewed.          Significant Labs:   CBC:   Recent Labs   Lab 02/04/20  0923   WBC 9.42   HGB 11.6*   HCT 36.0*   *     CMP:   Recent Labs   Lab 02/04/20  0923      K 3.9      CO2 27      BUN 7*   CREATININE 0.9   CALCIUM 9.8   PROT 8.0   ALBUMIN 3.2*   BILITOT 0.7   ALKPHOS 172*   AST 42*   ALT 18   ANIONGAP 11   EGFRNONAA >60     All pertinent labs within the past 24 hours have been reviewed.    Significant Imaging: I have reviewed all pertinent  imaging results/findings within the past 24 hours.

## 2020-02-04 NOTE — PROGRESS NOTES
Sw met with patient on today. Pt requested gas card for upcoming appointments. Sw provided patient with gas card and will f/u with any needs.

## 2020-02-04 NOTE — PROCEDURES
Radiology Post-Procedure Note    Pre Op Diagnosis: Hepatocellular carcinoma    Post Op Diagnosis: Same    Procedure: Yttrium treatment    Procedure performed by: Dr Ar Ham    Written Informed Consent Obtained: Yes    Specimen Removed: No    Estimated Blood Loss: Minimal    Findings: no complications.   Patient tolerated procedure well.    Ar Ham MD  Staff Radiologist  Department of Radiology  Pager: [phone]

## 2020-02-04 NOTE — NURSING TRANSFER
Nursing Transfer Note      2/4/2020     Transfer To:222    Transfer via stretcher    Transfer with none    Transported by [employer]    Medicines sent: none    Chart send with patient: Yes    Notified: spouse    Care handed off to donnie at 1505. On tele moniter. ivfs on pump at prescribed rate

## 2020-02-04 NOTE — H&P
[facility] Hospital Medicine  History & Physical    Patient Name: Ottoniel Arellano Jr.  MRN: [id number]  Admission Date: 2/4/2020  Attending Physician: Ar Ham MD   Primary Care Provider: David Stevens MD         Patient information was obtained from patient, past medical records and ER records.     Subjective:     Principal Problem:Hepatocellular carcinoma    Chief Complaint:   Chief Complaint   Patient presents with    Abdominal Pain        HPI: Ottoniel Arellano is an 79 year old male with CAD and arthritis who presented for yttrium treatment of his HCC performed by Dr. Ham today. He reports chronic diffuse abdominal pain, nausea, decreased appetite and weight loss, but denies other symptoms including fever and chills. He denies any change in chronic symptoms following the procedure. Of note, it appears the patient has been unable to take his oral medications due his chronic symptoms. Code status was discussed with the patient. He is a full code. His wife, Maddy Arellano, is his surrogate medical decision maker.     Past Medical History:   Diagnosis Date    Arthritis     Coronary artery disease     HCC (hepatocellular carcinoma)        Past Surgical History:   Procedure Laterality Date    ANGIOGRAM, CORONARY, WITH LEFT HEART CATHETERIZATION      stents 12/29/2012    EYE SURGERY      right, skin graft    FLUOROSCOPY N/A 1/21/2020    Procedure: Y90 Mapping;  Surgeon: Ar Ham MD;  Location: [facility] CATH LAB;  Service: General;  Laterality: N/A;    SPINAL FUSION      ACDF 05/04/2010    TONSILLECTOMY         Review of patient's allergies indicates:   Allergen Reactions    Penicillins Hives and Rash    Shellfish containing products      Pt states face and hands turn blotchy red and itch       No current facility-administered medications on file prior to encounter.      Current Outpatient Medications on File Prior to Encounter   Medication Sig    ascorbic acid (VITAMIN C) 500 MG tablet  Take 500 mg by mouth once daily.    aspirin 81 MG Chew Take 81 mg by mouth once daily.    atorvastatin (LIPITOR) 40 MG tablet Take 40 mg by mouth once daily.     cinnamon bark 500 mg capsule Take 1,000 mg by mouth once daily.    clonazePAM (KLONOPIN) 1 MG tablet Take 1 tablet (1 mg total) by mouth On call Procedure for Anxiety. Take 1 tablet 1 hr before the MRI procedure    diphenhydrAMINE (BENADRYL) 25 mg capsule Take 50 mg by mouth nightly as needed for Itching.    fish oil-omega-3 fatty acids 300-1,000 mg capsule Take 2 g by mouth once daily.    fluticasone propionate (FLONASE ALLERGY RELIEF) 50 mcg/actuation nasal spray 1 spray by Each Nare route once daily.    meloxicam (MOBIC) 15 MG tablet Take 15 mg by mouth daily as needed for Pain.    metoprolol succinate (TOPROL-XL) 25 MG 24 hr tablet Take 25 mg by mouth once daily.    metoprolol tartrate (LOPRESSOR) 25 MG tablet     NEXIUM 40 mg capsule Take 40 mg by mouth daily as needed.     ondansetron (ZOFRAN-ODT) 4 MG TbDL Take 1 tablet (4 mg total) by mouth every 8 (eight) hours as needed.    oxyCODONE (ROXICODONE) 5 MG immediate release tablet Take 1 tablet (5 mg total) by mouth every 4 (four) hours as needed for Pain. Medically necessary for more than 7 days    promethazine (PHENERGAN) 12.5 MG Tab Take 1 tablet (12.5 mg total) by mouth every 6 to 8 hours as needed.    tamsulosin (FLOMAX) 0.4 mg Cp24 Take 0.4 mg by mouth every evening.     vitamin D (VITAMIN D3) 1000 units Tab Take 1,000 Units by mouth.    vitamin E 400 unit Tab Take 1 tablet by mouth once daily.    warfarin (COUMADIN) 5 MG tablet Take 2 tablets by mouth on Tuesdays and Thursdays; and 1 tablet on all other days of the week. (Patient taking differently: Take 1/2 tablet by mouth on Tuesdays, Thursdays and Saturdays; and 1 tablet on all other days of the week.)     Family History     Reviewed and not pertinent.         Tobacco Use    Smoking status: Former Smoker    Smokeless  tobacco: Former User     Quit date: 5/18/1986   Substance and Sexual Activity    Alcohol use: No    Drug use: Not on file    Sexual activity: Not on file     Review of Systems   Constitutional: Positive for appetite change (decreased) and unexpected weight change (loss). Negative for chills, diaphoresis, fatigue and fever.   HENT: Negative for congestion, ear pain, mouth sores, sore throat and trouble swallowing.    Eyes: Negative for pain and visual disturbance.   Respiratory: Negative for cough, chest tightness and shortness of breath.    Cardiovascular: Negative for chest pain, palpitations and leg swelling.   Gastrointestinal: Positive for abdominal pain and nausea. Negative for constipation and diarrhea.   Endocrine: Negative for cold intolerance, heat intolerance, polydipsia and polyuria.   Genitourinary: Negative for dysuria, frequency and hematuria.   Musculoskeletal: Negative for arthralgias, back pain, myalgias and neck pain.   Skin: Negative for pallor, rash and wound.   Allergic/Immunologic: Negative for environmental allergies and immunocompromised state.   Neurological: Negative for dizziness, seizures, syncope, weakness, numbness and headaches.   Hematological: Negative for adenopathy. Does not bruise/bleed easily.   Psychiatric/Behavioral: Negative for agitation, confusion and sleep disturbance.     Objective:     Vital Signs (Most Recent):  Temp: 97 °F (36.1 °C) (02/04/20 1230)  Pulse: 73 (02/04/20 1400)  Resp: 14 (02/04/20 1400)  BP: (!) 137/57 (02/04/20 1400)  SpO2: 97 % (02/04/20 1400) Vital Signs (24h Range):  Temp:  [97 °F (36.1 °C)-98.2 °F (36.8 °C)] 97 °F (36.1 °C)  Pulse:  [65-79] 73  Resp:  [14-18] 14  SpO2:  [95 %-98 %] 97 %  BP: (137-164)/(53-83) 137/57     Weight: 89.4 kg (197 lb)  Body mass index is 28.27 kg/m².    Physical Exam   Constitutional: He is oriented to person, place, and time. He appears well-developed and well-nourished.   HENT:   Head: Normocephalic and atraumatic.    Eyes: Conjunctivae are normal.   Neck: Neck supple. No JVD present.   Cardiovascular: Normal rate, regular rhythm and normal heart sounds.   Pulmonary/Chest: Effort normal and breath sounds normal. He has no wheezes.   Abdominal: Soft. Bowel sounds are normal. He exhibits no distension. There is tenderness (diffuse).   Musculoskeletal: Normal range of motion.   Neurological: He is alert and oriented to person, place, and time.   Skin: Skin is warm and dry. No rash noted.   Psychiatric: He has a normal mood and affect. His behavior is normal. Thought content normal.   Nursing note and vitals reviewed.          Significant Labs:   CBC:   Recent Labs   Lab 02/04/20  0923   WBC 9.42   HGB 11.6*   HCT 36.0*   *     CMP:   Recent Labs   Lab 02/04/20  0923      K 3.9      CO2 27      BUN 7*   CREATININE 0.9   CALCIUM 9.8   PROT 8.0   ALBUMIN 3.2*   BILITOT 0.7   ALKPHOS 172*   AST 42*   ALT 18   ANIONGAP 11   EGFRNONAA >60     All pertinent labs within the past 24 hours have been reviewed.    Significant Imaging: I have reviewed all pertinent imaging results/findings within the past 24 hours.           Assessment/Plan:     * Hepatocellular carcinoma  -S/P yttrium treatment.   -Continue symptomatic care.   -Monitor LFTs.   -Plan to discharge with Cipro BID X 10 days.       Essential hypertension  -Stable.   -Resume metoprolol and monitor.       CAD (coronary artery disease)  -Asymptomatic.   -Resume ASA and metoprolol.        VTE Risk Mitigation (From admission, onward)         Ordered     Place sequential compression device  Until discontinued      02/04/20 4450                   JESSICA Marc  Department of Hospital Medicine   [facility]

## 2020-02-05 VITALS
SYSTOLIC BLOOD PRESSURE: 163 MMHG | BODY MASS INDEX: 26.04 KG/M2 | HEIGHT: 70 IN | HEART RATE: 67 BPM | TEMPERATURE: 98 F | WEIGHT: 181.88 LBS | OXYGEN SATURATION: 97 % | DIASTOLIC BLOOD PRESSURE: 71 MMHG | RESPIRATION RATE: 18 BRPM

## 2020-02-05 PROBLEM — E43 SEVERE MALNUTRITION: Status: ACTIVE | Noted: 2020-02-05

## 2020-02-05 LAB
ALBUMIN SERPL BCP-MCNC: 2.7 G/DL (ref 3.5–5.2)
ALP SERPL-CCNC: 153 U/L (ref 55–135)
ALT SERPL W/O P-5'-P-CCNC: 15 U/L (ref 10–44)
ANION GAP SERPL CALC-SCNC: 9 MMOL/L (ref 8–16)
AST SERPL-CCNC: 40 U/L (ref 10–40)
BASOPHILS # BLD AUTO: 0.05 K/UL (ref 0–0.2)
BASOPHILS NFR BLD: 0.4 % (ref 0–1.9)
BILIRUB SERPL-MCNC: 0.8 MG/DL (ref 0.1–1)
BUN SERPL-MCNC: 7 MG/DL (ref 8–23)
CALCIUM SERPL-MCNC: 8.9 MG/DL (ref 8.7–10.5)
CHLORIDE SERPL-SCNC: 104 MMOL/L (ref 95–110)
CO2 SERPL-SCNC: 24 MMOL/L (ref 23–29)
CREAT SERPL-MCNC: 0.8 MG/DL (ref 0.5–1.4)
DIFFERENTIAL METHOD: ABNORMAL
EOSINOPHIL # BLD AUTO: 0.1 K/UL (ref 0–0.5)
EOSINOPHIL NFR BLD: 0.9 % (ref 0–8)
ERYTHROCYTE [DISTWIDTH] IN BLOOD BY AUTOMATED COUNT: 15.4 % (ref 11.5–14.5)
EST. GFR  (AFRICAN AMERICAN): >60 ML/MIN/1.73 M^2
EST. GFR  (NON AFRICAN AMERICAN): >60 ML/MIN/1.73 M^2
GLUCOSE SERPL-MCNC: 115 MG/DL (ref 70–110)
HCT VFR BLD AUTO: 34.4 % (ref 40–54)
HGB BLD-MCNC: 10.9 G/DL (ref 14–18)
IMM GRANULOCYTES # BLD AUTO: 0.05 K/UL (ref 0–0.04)
IMM GRANULOCYTES NFR BLD AUTO: 0.4 % (ref 0–0.5)
LYMPHOCYTES # BLD AUTO: 0.9 K/UL (ref 1–4.8)
LYMPHOCYTES NFR BLD: 8.1 % (ref 18–48)
MAGNESIUM SERPL-MCNC: 1.6 MG/DL (ref 1.6–2.6)
MCH RBC QN AUTO: 27.6 PG (ref 27–31)
MCHC RBC AUTO-ENTMCNC: 31.7 G/DL (ref 32–36)
MCV RBC AUTO: 87 FL (ref 82–98)
MONOCYTES # BLD AUTO: 1 K/UL (ref 0.3–1)
MONOCYTES NFR BLD: 8.6 % (ref 4–15)
NEUTROPHILS # BLD AUTO: 9.2 K/UL (ref 1.8–7.7)
NEUTROPHILS NFR BLD: 81.6 % (ref 38–73)
NRBC BLD-RTO: 0 /100 WBC
PHOSPHATE SERPL-MCNC: 2.8 MG/DL (ref 2.7–4.5)
PLATELET # BLD AUTO: 377 K/UL (ref 150–350)
PMV BLD AUTO: 10.1 FL (ref 9.2–12.9)
POTASSIUM SERPL-SCNC: 4 MMOL/L (ref 3.5–5.1)
PROT SERPL-MCNC: 7 G/DL (ref 6–8.4)
RBC # BLD AUTO: 3.95 M/UL (ref 4.6–6.2)
SODIUM SERPL-SCNC: 137 MMOL/L (ref 136–145)
WBC # BLD AUTO: 11.28 K/UL (ref 3.9–12.7)

## 2020-02-05 PROCEDURE — 97802 MEDICAL NUTRITION INDIV IN: CPT

## 2020-02-05 PROCEDURE — 84100 ASSAY OF PHOSPHORUS: CPT

## 2020-02-05 PROCEDURE — 25000003 PHARM REV CODE 250: Performed by: PHYSICIAN ASSISTANT

## 2020-02-05 PROCEDURE — 63600175 PHARM REV CODE 636 W HCPCS: Performed by: PHYSICIAN ASSISTANT

## 2020-02-05 PROCEDURE — 36415 COLL VENOUS BLD VENIPUNCTURE: CPT

## 2020-02-05 PROCEDURE — 80053 COMPREHEN METABOLIC PANEL: CPT

## 2020-02-05 PROCEDURE — 85025 COMPLETE CBC W/AUTO DIFF WBC: CPT

## 2020-02-05 PROCEDURE — 83735 ASSAY OF MAGNESIUM: CPT

## 2020-02-05 RX ORDER — CIPROFLOXACIN 500 MG/1
500 TABLET ORAL 2 TIMES DAILY
Qty: 20 TABLET | Refills: 0 | Status: SHIPPED | OUTPATIENT
Start: 2020-02-05 | End: 2020-02-15

## 2020-02-05 RX ORDER — ONDANSETRON 4 MG/1
4 TABLET, FILM COATED ORAL EVERY 8 HOURS PRN
Qty: 30 TABLET | Refills: 1 | Status: SHIPPED | OUTPATIENT
Start: 2020-02-05 | End: 2020-02-25 | Stop reason: SDUPTHER

## 2020-02-05 RX ADMIN — ASPIRIN 81 MG 81 MG: 81 TABLET ORAL at 09:02

## 2020-02-05 RX ADMIN — METOPROLOL TARTRATE 25 MG: 25 TABLET ORAL at 09:02

## 2020-02-05 RX ADMIN — SODIUM CHLORIDE: 0.9 INJECTION, SOLUTION INTRAVENOUS at 12:02

## 2020-02-05 RX ADMIN — PANTOPRAZOLE SODIUM 40 MG: 40 TABLET, DELAYED RELEASE ORAL at 09:02

## 2020-02-05 RX ADMIN — OXYCODONE HYDROCHLORIDE 5 MG: 5 TABLET ORAL at 02:02

## 2020-02-05 NOTE — ASSESSMENT & PLAN NOTE
Nutrition Problem:  (Severe) Protein-Calorie Malnutrition  Malnutrition in the context of Chronic Illness/Injury    Related to (etiology):  Increased demand for calories and protein secondary to cancer    Signs and Symptoms (as evidenced by):  Energy Intake: <50% of estimated energy requirement for 2 weeks  Body Fat Depletion: mild depletion of orbitals and triceps   Muscle Mass Depletion: mild depletion of temples and clavicle region   Weight Loss: 13% x 6mo    Interventions(treatment strategy):  Commercial beverage - calories and protein - Boost Plus  General healthy diet  Collaboration with other providers    Nutrition Diagnosis Status:  New

## 2020-02-05 NOTE — PLAN OF CARE
SW met with patient at the bedside to assess for discharge summary.  Patient was alert and oriented.  Patient denied the use of any assistive equipment before coming to the hospital.  Patient identified his help at home as his wife.  Patient denied the need for all assistive equipment, but requested home health services and a gas card to assist with transportation back home.  The patient identified no additional needs upon discharge.  SW provided a transitional care folder, information on advanced directives, information on pharmacy bedside delivery, and discharge planning begins on admission with contact information for any needs/questions.     D/C Plan:  Home  PCP:  Dr. David Stevens  Preferred Pharmacy:    [facility] - [place], [place] - [address]  [address]  [place] [address]  Phone: [phone] Fax: [phone]    [id number] - [place], [place] - [address]  [address]  [place] [address]  Phone: [phone] Fax: [phone]    Discharge transportation:  Family  [facility]:  Active  Pharmacy Bedside Delivery:  Yes       02/05/20 1340   Discharge Assessment   Assessment Type Discharge Planning Assessment   Confirmed/corrected address and phone number on facesheet? Yes   Assessment information obtained from? Patient   Expected Length of Stay (days)   (TBD)   Communicated expected length of stay with patient/caregiver yes   Prior to hospitilization cognitive status: Alert/Oriented   Prior to hospitalization functional status: Independent   Current cognitive status: Alert/Oriented   Current Functional Status: Independent   Facility Arrived From: [facility]   Lives With spouse   Able to Return to Prior Arrangements yes   Is patient able to care for self after discharge? Unable to determine at this time (comments)   Who are your caregiver(s) and their phone number(s)? Maddy Arellano (spouse) [phone]   Patient's perception of discharge disposition home or selfcare    Readmission Within the Last 30 Days no previous admission in last 30 days   Patient currently being followed by outpatient case management? No   Patient currently receives any other outside agency services? Yes   Name and contact number of agency or person providing outside services [facility]   Is it the patient/care giver preference to resume care with the current outside agency? Yes   Equipment Currently Used at Home none   Do you have any problems affording any of your prescribed medications? No   Is the patient taking medications as prescribed? yes   Does the patient have transportation home? Yes   Transportation Anticipated family or friend will provide   Dialysis Name and Scheduled days N/A   Does the patient receive services at the [facility]madin Clinic? No   Discharge Plan A Home with family   Discharge Plan B Home Health;Home with family   DME Needed Upon Discharge  none   Patient/Family in Agreement with Plan yes

## 2020-02-05 NOTE — DISCHARGE SUMMARY
[facility] Hospital Medicine  Discharge Summary      Patient Name: Ottoniel Arellano Jr.  MRN: [id number]  Admission Date: 2/4/2020  Hospital Length of Stay: 0 days  Discharge Date and Time: 2/5/2020 12:56 PM  Attending Physician: No att. providers found   Discharging Provider: Piedad Chauhan MD  Primary Care Provider: David Stevens MD      HPI:   Ottoniel Arellano is an 79 year old male with CAD and arthritis who presented for yttrium treatment of his HCC performed by Dr. Ham today. He reports chronic diffuse abdominal pain, nausea, decreased appetite and weight loss, but denies other symptoms including fever and chills. He denies any change in chronic symptoms following the procedure. Of note, it appears the patient has been unable to take his oral medications due his chronic symptoms. Code status was discussed with the patient. He is a full code. His wife, Maddy Arellano, is his surrogate medical decision maker.     Procedure(s) (LRB):  TheraSphere Liver Mass (N/A)      Hospital Course:   Patient 80 yo male placed in extended recovery s/p Y90 procedure performed by IR. Patient tolerating procedure without complication and pain controlled. Patient seen and examined today prior to his discharge to home deemed stable for a safe d/c.Please see procedure note from Dr Ham for detailed account of procedure performed.         Consults:   Consults (From admission, onward)        Status Ordering Provider     Inpatient consult to Registered Dietitian/Nutritionist  Once     Provider:  (Not yet assigned)    PIEDAD Wood          No new Assessment & Plan notes have been filed under this hospital service since the last note was generated.  Service: Hospital Medicine    Final Active Diagnoses:    Diagnosis Date Noted POA    PRINCIPAL PROBLEM:  Hepatocellular carcinoma [C22.0] 07/12/2019 Yes    Severe malnutrition [E43] 02/05/2020 Unknown    Essential hypertension [I10] 08/26/2019 Yes    CAD (coronary  artery disease) [I25.10] 07/12/2019 Yes      Problems Resolved During this Admission:       Discharged Condition: stable    Disposition: Home or Self Care    Follow Up:  Follow-up Information     David Stevens MD In 3 days.    Specialty:  Family Medicine  Contact information:  [address]  [address]  [facility]  [place] [address]  [phone]                 Patient Instructions:      Ambulatory referral/consult to Home Health   Referral Priority: Routine Referral Type: Home Health   Referral Reason: Specialty Services Required   Requested Specialty: Home Health Services   Number of Visits Requested: 1     Diet Cardiac     Activity as tolerated       Significant Diagnostic Studies: Labs:   BMP:   Recent Labs   Lab 02/04/20  0923 02/05/20  0424    115*    137   K 3.9 4.0    104   CO2 27 24   BUN 7* 7*   CREATININE 0.9 0.8   CALCIUM 9.8 8.9   MG  --  1.6   , CMP   Recent Labs   Lab 02/04/20 0923 02/05/20  0424    137   K 3.9 4.0    104   CO2 27 24    115*   BUN 7* 7*   CREATININE 0.9 0.8   CALCIUM 9.8 8.9   PROT 8.0 7.0   ALBUMIN 3.2* 2.7*   BILITOT 0.7 0.8   ALKPHOS 172* 153*   AST 42* 40   ALT 18 15   ANIONGAP 11 9   ESTGFRAFRICA >60 >60   EGFRNONAA >60 >60   , CBC   Recent Labs   Lab 02/04/20 0923 02/05/20  0424   WBC 9.42 11.28   HGB 11.6* 10.9*   HCT 36.0* 34.4*   * 377*    and All labs within the past 24 hours have been reviewed    Pending Diagnostic Studies:     None         Medications:  Reconciled Home Medications:      Medication List      START taking these medications    ciprofloxacin HCl 500 MG tablet  Commonly known as:  CIPRO  Take 1 tablet (500 mg total) by mouth 2 (two) times daily. for 10 days     ondansetron 4 MG tablet  Commonly known as:  ZOFRAN  Take 1 tablet (4 mg total) by mouth every 8 (eight) hours as needed for Nausea.        CHANGE how you take these medications    warfarin 5 MG tablet  Commonly known as:  COUMADIN  Take 2  tablets by mouth on Tuesdays and Thursdays; and 1 tablet on all other days of the week.  What changed:  additional instructions        CONTINUE taking these medications    aspirin 81 MG Chew  Take 81 mg by mouth once daily.     atorvastatin 40 MG tablet  Commonly known as:  LIPITOR  Take 40 mg by mouth once daily.     cinnamon bark 500 mg capsule  Take 1,000 mg by mouth once daily.     clonazePAM 1 MG tablet  Commonly known as:  KLONOPIN  Take 1 tablet (1 mg total) by mouth On call Procedure for Anxiety. Take 1 tablet 1 hr before the MRI procedure     diphenhydrAMINE 25 mg capsule  Commonly known as:  BENADRYL  Take 50 mg by mouth nightly as needed for Itching.     fish oil-omega-3 fatty acids 300-1,000 mg capsule  Take 2 g by mouth once daily.     Flonase Allergy Relief 50 mcg/actuation nasal spray  Generic drug:  fluticasone propionate  1 spray by Each Nare route once daily.     meloxicam 15 MG tablet  Commonly known as:  MOBIC  Take 15 mg by mouth daily as needed for Pain.     metoprolol succinate 25 MG 24 hr tablet  Commonly known as:  TOPROL-XL  Take 25 mg by mouth once daily.     metoprolol tartrate 25 MG tablet  Commonly known as:  LOPRESSOR     NexIUM 40 MG capsule  Generic drug:  esomeprazole  Take 40 mg by mouth daily as needed.     oxyCODONE 5 MG immediate release tablet  Commonly known as:  Roxicodone  Take 1 tablet (5 mg total) by mouth every 4 (four) hours as needed for Pain. Medically necessary for more than 7 days     promethazine 12.5 MG Tab  Commonly known as:  PHENERGAN  Take 1 tablet (12.5 mg total) by mouth every 6 to 8 hours as needed.     tamsulosin 0.4 mg Cap  Commonly known as:  FLOMAX  Take 0.4 mg by mouth every evening.     Vitamin C 500 MG tablet  Generic drug:  ascorbic acid (vitamin C)  Take 500 mg by mouth once daily.     vitamin D 1000 units Tab  Commonly known as:  VITAMIN D3  Take 1,000 Units by mouth.     vitamin E 400 unit Tab  Take 1 tablet by mouth once daily.        STOP taking  these medications    ondansetron 4 MG Tbdl  Commonly known as:  ZOFRAN-ODT            Indwelling Lines/Drains at time of discharge:   Lines/Drains/Airways     None                 Time spent on the discharge of patient: 36 minutes  Patient was seen and examined on the date of discharge and determined to be suitable for discharge.         Jeremi Chauhan MD  Department of Hospital Medicine  [facility]

## 2020-02-05 NOTE — PLAN OF CARE
Preference signed for [facility].  Referral sent via [facility].       02/05/20 1320   Post-Acute Status   Post-Acute Authorization Home Health/Hospice   Patient choice form signed by patient/caregiver List from [facility]       2-6-20 9:00am Spoke with Desi at [facility].  States she did not received the referral and requested it be faxed to [phone]. Faxed referral and home health orders.    1:20pm Spoke to Desi. She received the above information and are admitting the patient.

## 2020-02-05 NOTE — PLAN OF CARE
Patient awake and alert free from falls and injury spouse at bedside, normal sinus rhythm on monitor, pain controlled with prn medication, Left wrist incision covered with gauze clean, dry and intact, frequent weight shift encouraged, continuous normal saline infusing,  POC reviewed with patient and spouse,   bed low locked, call light within reach, will continue to monitor

## 2020-02-05 NOTE — PLAN OF CARE
Dx chemo beads  Poc instructed on dbc 10 x q1h wa instructed on turning q2h. Instructed on fall risk and precautions. Bed alarm on urinal at bedside. Instructed on pain management ,meds and scale. Oriented to room and call light. , ivf infusing. Urinal at bedside. Wawter, phone and call light in reach.

## 2020-02-05 NOTE — NURSING
Discharge summary, health teachings and instructions given to patient with spouse at bedside--verbalized understanding. IV removed-no complications noted. Tele monitor removed and returned to the monitor room. All questions answered with regards to discharge instructions. Reminded of the importance of follow-up appointments, to call PCP for an appointment. Prescribed meds at bedside.

## 2020-02-05 NOTE — CONSULTS
"  [facility] -   Adult Nutrition  Consult Note    SUMMARY     Recommendations    Recommendation: 1. Send Boost Plus TID 2. Continue current diet 3. RD to follow  Goals: Meet >85% EEN/EPN by RD f/u  Nutrition Goal Status: new  Communication of RD Recs: (POC, sticky note)    Reason for Assessment    Reason For Assessment: consult  Diagnosis: (Liver mass, hepatocellular carcinoma)  Relevant Medical History: CAD, arthritis  General Information Comments: Consulted for pt starting chemo and poor appetite. Pt reports 0% PO intake today r/t nausea. Reports decreased appetite of PO intake <50% x2 weeks PTA. Weight loss of 27lbs/13% x6 months noted per epic records. Pt reports he's always had a small appetite. Educated on high calorie meal choices to help with wt gain or maintenance at home. NFPE performed 2/5/20. Mild muscle wasting and fat depletion noted.   Nutrition Discharge Planning: Regular diet    Nutrition Risk Screen    Nutrition Risk Screen: other (see comments)(pt with chemo, poor appetite, has lost weight.)    Nutrition/Diet History    Spiritual, Cultural Beliefs, Lutheran Practices, Values that Affect Care: no    Anthropometrics    Temp: 98 °F (36.7 °C)  Height Method: Stated  Height: 5' 10" (177.8 cm)  Height (inches): 70 in  Weight Method: Bed Scale  Weight: 82.5 kg (181 lb 14.1 oz)  Weight (lb): 181.88 lb  Ideal Body Weight (IBW), Male: 166 lb  % Ideal Body Weight, Male (lb): 118.67 %  BMI (Calculated): 26.1  BMI Grade: 25 - 29.9 - overweight       Lab/Procedures/Meds    Pertinent Labs Reviewed: reviewed  BMP  Lab Results   Component Value Date     02/05/2020    K 4.0 02/05/2020     02/05/2020    CO2 24 02/05/2020    BUN 7 (L) 02/05/2020    CREATININE 0.8 02/05/2020    CALCIUM 8.9 02/05/2020    ANIONGAP 9 02/05/2020    ESTGFRAFRICA >60 02/05/2020    EGFRNONAA >60 02/05/2020     No results for input(s): POCTGLUCOSE in the last 24 hours.  No results found for: LABA1C, HGBA1C  Lab " Results   Component Value Date    ALBUMIN 2.7 (L) 02/05/2020       Pertinent Medications Reviewed: reviewed      Estimated/Assessed Needs    Weight Used For Calorie Calculations: 82.5 kg (181 lb 14.1 oz)  Energy Calorie Requirements (kcal): 1974-1794  Energy Need Method: Kcal/kg  Protein Requirements: 99-124g  Weight Used For Protein Calculations: 82.5 kg (181 lb 14.1 oz)     Estimated Fluid Requirement Method: RDA Method(or per MD)  RDA Method (mL): 2475         Nutrition Prescription Ordered    Current Diet Order: Regular    Evaluation of Received Nutrient/Fluid Intake          Intake/Output Summary (Last 24 hours) at 2/5/2020 1405  Last data filed at 2/5/2020 0600  Gross per 24 hour   Intake 2108.33 ml   Output 500 ml   Net 1608.33 ml       % Intake of Estimated Energy Needs: 0 - 25 %  % Meal Intake: 0 - 25 %    Nutrition Risk    Level of Risk/Frequency of Follow-up: (2x/week)     Assessment and Plan    Severe malnutrition  Nutrition Problem:  (Severe) Protein-Calorie Malnutrition  Malnutrition in the context of Chronic Illness/Injury    Related to (etiology):  Increased demand for calories and protein secondary to cancer    Signs and Symptoms (as evidenced by):  Energy Intake: <50% of estimated energy requirement for 2 weeks  Body Fat Depletion: mild depletion of orbitals and triceps   Muscle Mass Depletion: mild depletion of temples and clavicle region   Weight Loss: 13% x 6mo    Interventions(treatment strategy):  Commercial beverage - calories and protein - Boost Plus  General healthy diet  Collaboration with other providers    Nutrition Diagnosis Status:  New             Monitor and Evaluation    Food and Nutrient Intake: energy intake  Food and Nutrient Adminstration: diet order  Anthropometric Measurements: weight  Biochemical Data, Medical Tests and Procedures: electrolyte and renal panel, glucose/endocrine profile  Nutrition-Focused Physical Findings: overall appearance     Malnutrition Assessment                  Orbital Region (Subcutaneous Fat Loss): mild depletion  Upper Arm Region (Subcutaneous Fat Loss): mild depletion  Thoracic and Lumbar Region: mild depletion   Alevism Region (Muscle Loss): mild depletion  Clavicle Bone Region (Muscle Loss): mild depletion  Clavicle and Acromion Bone Region (Muscle Loss): mild depletion  Dorsal Hand (Muscle Loss): mild depletion                 Nutrition Follow-Up    RD Follow-up?: Yes

## 2020-02-05 NOTE — DISCHARGE INSTRUCTIONS
DISCHARGE INSTRUCTIONS POST Y-90    1) Patients should not sleep in the same bed with their partners for one week. Adult family members may stay in the same room with the patient, keeping a six foot distance for one week.    2) No close contact with children, pregnant women, or animals for one week.    3) No public transportation next to a fellow passenger for more than two hours, for one week.    4) Uneaten food may be disposed of in the usual fashion.    5) Clothing and linens may be changed and laundered in the usual fashion.    6) Drink plenty of fluids. Dehydration can result in readmission.    7) Should you require urgent care or readmission, notify your health care providers of your procedure and date of treatment, no hesitation should be given in providing the care you need.    8)  In the event of death from any cause after the procedure, there may be restrictions on cremation or embalming. Please have a family member or friend contact the office. Your interventional radiologist should be informed immediately so the appropriate precautions can be taken.      WHAT TO WATCH FOR    1) It is normal to experience fatigue, flu like symptoms, fever (less than 101.5 degrees Fahrenheit or 38.5 degrees Celsius), nausea, and pain on your right side, back shoulder which is controllable with prescribed pain medication.    2) Call the interventional radiologist for  * Fever greater than 101.5/38.5  * Shortness of breath  * Pain in the chest, back or abdomen that does not respond to prescribed pain medications  *Visible changes in the skin over the abdomen or back

## 2020-02-05 NOTE — PROGRESS NOTES
Pt sitting up in bed. C/o mid epigastric pain and rt lateral abdominal pain.  Pt medicated per mar pt with elevated bp will medicate per mar. Pt wife at bedside. C/o dizziness upon turning but pt stayed the same.

## 2020-02-06 NOTE — PLAN OF CARE
02/06/20 0757   Final Note   Assessment Type Final Discharge Note   Anticipated Discharge Disposition Home-Health   Right Care Referral Info   Post Acute Recommendation Home-care   Facility Name [facility]

## 2020-02-07 PROCEDURE — G0180 PR HOME HEALTH MD CERTIFICATION: ICD-10-PCS | Mod: ,,, | Performed by: INTERNAL MEDICINE

## 2020-02-07 PROCEDURE — G0180 MD CERTIFICATION HHA PATIENT: HCPCS | Mod: ,,, | Performed by: INTERNAL MEDICINE

## 2020-02-10 DIAGNOSIS — I81 PORTAL VEIN THROMBOSIS: ICD-10-CM

## 2020-02-10 DIAGNOSIS — R16.0 LIVER MASS: ICD-10-CM

## 2020-02-10 RX ORDER — OXYCODONE HYDROCHLORIDE 5 MG/1
5 TABLET ORAL EVERY 4 HOURS PRN
Qty: 60 TABLET | Refills: 0 | Status: SHIPPED | OUTPATIENT
Start: 2020-02-10 | End: 2020-02-25 | Stop reason: SDUPTHER

## 2020-02-11 ENCOUNTER — DOCUMENTATION ONLY (OUTPATIENT)
Dept: HEMATOLOGY/ONCOLOGY | Facility: CLINIC | Age: 80
End: 2020-02-11

## 2020-02-11 NOTE — NURSING
Pt called in this am and left a voicemail on Krissy May's phone regarding receiving a phone call from someone regarding setting up some treatments.  When I returned his call he explained that he had gotten a call from someone wanting to set up some treatments but he lost the message and did not have a phone number to call back. After researching, it was not radiology, not Dr. Camacho's office, nor hem/onc. It was not radiology at the hospital either.  I returned pts call and reviewed this with him.  He will await another call and if it is a voicemail will write the number down next time.  He does have follow up in March with scans and Dr. Kevin which he is aware of he has my direct number to call back with any further concerns.   Oncology Navigation   Intake  MD Assigned: griselda  Initial Nurse Navigator Contact: 12/26/19  Contact Method: Phone  Date Worked: 02/11/20  Reason if booked > 7 days after scheduling: Other  Other reason booked > 7 days: MD request to have patient back to discuss possible biopsy  Reason for Treatment Delay: Further work-up  Further Work-Up: biopsy     Treatment  Current Status: Staging work-up    Procedures: Biopsy  Biopsy Schedule Date: 12/31/19       Support Systems: Spouse/significant other  Barriers of Care: Comorbidities  Comorbidities: blood thinners     Acuity  Treatment Tolerability: Has not started treatment yet/treatment fully completed and side effects resolved  Comorbidities in Medical History: 2   Needed: 0  Verbalizes the need for more education: 1  Navigation Acuity: 2     Follow Up  No follow-ups on file.

## 2020-02-18 ENCOUNTER — EXTERNAL HOME HEALTH (OUTPATIENT)
Dept: HOME HEALTH SERVICES | Facility: HOSPITAL | Age: 80
End: 2020-02-18

## 2020-02-18 ENCOUNTER — EXTERNAL HOME HEALTH (OUTPATIENT)
Dept: HOME HEALTH SERVICES | Facility: HOSPITAL | Age: 80
End: 2020-02-18
Payer: MEDICARE

## 2020-02-19 ENCOUNTER — EXTERNAL HOME HEALTH (OUTPATIENT)
Dept: HOME HEALTH SERVICES | Facility: HOSPITAL | Age: 80
End: 2020-02-19
Payer: MEDICARE

## 2020-02-21 ENCOUNTER — TELEPHONE (OUTPATIENT)
Dept: HEMATOLOGY/ONCOLOGY | Facility: CLINIC | Age: 80
End: 2020-02-21

## 2020-02-21 NOTE — TELEPHONE ENCOUNTER
Patient wife called about patient having trouble eating and keeping the food down. Patient has accepted an appt for Tuesday to f/u with Dr. Kevin.

## 2020-02-25 ENCOUNTER — HOSPITAL ENCOUNTER (OUTPATIENT)
Dept: RADIOLOGY | Facility: HOSPITAL | Age: 80
Discharge: HOME | End: 2020-02-25
Attending: INTERNAL MEDICINE
Payer: MEDICARE

## 2020-02-25 ENCOUNTER — TELEPHONE (OUTPATIENT)
Dept: HEMATOLOGY/ONCOLOGY | Facility: CLINIC | Age: 80
End: 2020-02-25

## 2020-02-25 ENCOUNTER — OFFICE VISIT (OUTPATIENT)
Dept: HEMATOLOGY/ONCOLOGY | Facility: CLINIC | Age: 80
End: 2020-02-25
Payer: MEDICARE

## 2020-02-25 VITALS
BODY MASS INDEX: 25.31 KG/M2 | RESPIRATION RATE: 18 BRPM | OXYGEN SATURATION: 96 % | HEIGHT: 70 IN | TEMPERATURE: 98 F | WEIGHT: 176.81 LBS | SYSTOLIC BLOOD PRESSURE: 160 MMHG | DIASTOLIC BLOOD PRESSURE: 74 MMHG | HEART RATE: 65 BPM

## 2020-02-25 DIAGNOSIS — I81 PORTAL VEIN THROMBOSIS: ICD-10-CM

## 2020-02-25 DIAGNOSIS — C22.0 HCC (HEPATOCELLULAR CARCINOMA): ICD-10-CM

## 2020-02-25 DIAGNOSIS — R16.0 LIVER MASS: ICD-10-CM

## 2020-02-25 DIAGNOSIS — D49.89 NEOPLASM OF ABDOMEN: ICD-10-CM

## 2020-02-25 DIAGNOSIS — C22.0 HCC (HEPATOCELLULAR CARCINOMA): Primary | ICD-10-CM

## 2020-02-25 PROCEDURE — 99999 PR PBB SHADOW E&M-EST. PATIENT-LVL IV: CPT | Mod: PBBFAC,,, | Performed by: INTERNAL MEDICINE

## 2020-02-25 PROCEDURE — 74177 CT ABD & PELVIS W/CONTRAST: CPT | Mod: TC

## 2020-02-25 PROCEDURE — 74177 CT ABD & PELVIS W/CONTRAST: CPT | Mod: 26,,, | Performed by: RADIOLOGY

## 2020-02-25 PROCEDURE — 99214 OFFICE O/P EST MOD 30 MIN: CPT | Mod: S$PBB,,, | Performed by: INTERNAL MEDICINE

## 2020-02-25 PROCEDURE — 71260 CT THORAX DX C+: CPT | Mod: 26,,, | Performed by: RADIOLOGY

## 2020-02-25 PROCEDURE — 74177 CT CHEST ABDOMEN PELVIS WITH CONTRAST (XPD): ICD-10-PCS | Mod: 26,,, | Performed by: RADIOLOGY

## 2020-02-25 PROCEDURE — 25500020 PHARM REV CODE 255: Performed by: INTERNAL MEDICINE

## 2020-02-25 PROCEDURE — 71260 CT CHEST ABDOMEN PELVIS WITH CONTRAST (XPD): ICD-10-PCS | Mod: 26,,, | Performed by: RADIOLOGY

## 2020-02-25 PROCEDURE — 99214 OFFICE O/P EST MOD 30 MIN: CPT | Mod: PBBFAC,25 | Performed by: INTERNAL MEDICINE

## 2020-02-25 PROCEDURE — 99214 PR OFFICE/OUTPT VISIT, EST, LEVL IV, 30-39 MIN: ICD-10-PCS | Mod: S$PBB,,, | Performed by: INTERNAL MEDICINE

## 2020-02-25 PROCEDURE — 99999 PR PBB SHADOW E&M-EST. PATIENT-LVL IV: ICD-10-PCS | Mod: PBBFAC,,, | Performed by: INTERNAL MEDICINE

## 2020-02-25 RX ORDER — MORPHINE SULFATE 30 MG/1
30 TABLET, FILM COATED, EXTENDED RELEASE ORAL EVERY 8 HOURS
Qty: 90 TABLET | Refills: 0 | Status: SHIPPED | OUTPATIENT
Start: 2020-02-25 | End: 2020-03-05

## 2020-02-25 RX ORDER — OXYCODONE HYDROCHLORIDE 5 MG/1
5 TABLET ORAL EVERY 4 HOURS PRN
Qty: 90 TABLET | Refills: 0 | Status: SHIPPED | OUTPATIENT
Start: 2020-02-25 | End: 2020-03-31 | Stop reason: SDUPTHER

## 2020-02-25 RX ORDER — MORPHINE SULFATE 30 MG/1
30 TABLET, FILM COATED, EXTENDED RELEASE ORAL EVERY 8 HOURS
Qty: 90 TABLET | Refills: 0 | Status: SHIPPED | OUTPATIENT
Start: 2020-02-25 | End: 2020-02-25

## 2020-02-25 RX ORDER — MEGESTROL ACETATE 40 MG/ML
SUSPENSION ORAL
COMMUNITY
Start: 2020-02-17 | End: 2020-03-03

## 2020-02-25 RX ORDER — OXYCODONE HYDROCHLORIDE 5 MG/1
5 TABLET ORAL EVERY 4 HOURS PRN
Qty: 90 TABLET | Refills: 0 | Status: SHIPPED | OUTPATIENT
Start: 2020-02-25 | End: 2020-02-25

## 2020-02-25 RX ORDER — ONDANSETRON 4 MG/1
4 TABLET, FILM COATED ORAL EVERY 8 HOURS PRN
Qty: 30 TABLET | Refills: 11 | Status: SHIPPED | OUTPATIENT
Start: 2020-02-25 | End: 2020-03-05

## 2020-02-25 RX ADMIN — IOHEXOL 100 ML: 350 INJECTION, SOLUTION INTRAVENOUS at 03:02

## 2020-02-25 NOTE — TELEPHONE ENCOUNTER
Pt left voicemail requesting a gas card. SW contacted pt and informed pt that a gas card would be given to him after she contacted appropriate staff. JAVIER Serrano, provided pt with a $50 gas card.    F/u as needs arise.    Addendum:    Pt contacted SW once more to verbalize consent for reimbursement of hotel stay and agreed to provide receipt after his appt on tmrw.

## 2020-02-25 NOTE — PROGRESS NOTES
Subjective:       Patient ID: Ottoniel Arellano Jr. is a 79 y.o. male.    Chief Complaint: Follow-up; Results; Cancer; and Pain    HPI 79-year-old male seen urgently patient is concerned along with his wife about increasing pain and weight loss and lack of appetite patient treated with yttrium 99 for hepatocellular carcinoma.  ECOG status 2    Past Medical History:   Diagnosis Date    Arthritis     Coronary artery disease     HCC (hepatocellular carcinoma)      History reviewed. No pertinent family history.  Social History     Socioeconomic History    Marital status:      Spouse name: Not on file    Number of children: Not on file    Years of education: Not on file    Highest education level: Not on file   Occupational History    Not on file   Social Needs    Financial resource strain: Not on file    Food insecurity:     Worry: Not on file     Inability: Not on file    Transportation needs:     Medical: Not on file     Non-medical: Not on file   Tobacco Use    Smoking status: Former Smoker    Smokeless tobacco: Former User     Quit date: 5/18/1986   Substance and Sexual Activity    Alcohol use: No    Drug use: Not on file    Sexual activity: Not on file   Lifestyle    Physical activity:     Days per week: Not on file     Minutes per session: Not on file    Stress: Not on file   Relationships    Social connections:     Talks on phone: Not on file     Gets together: Not on file     Attends Mosque service: Not on file     Active member of club or organization: Not on file     Attends meetings of clubs or organizations: Not on file     Relationship status: Not on file   Other Topics Concern    Not on file   Social History Narrative    Not on file     Past Surgical History:   Procedure Laterality Date    ANGIOGRAM, CORONARY, WITH LEFT HEART CATHETERIZATION      stents 12/29/2012    EYE SURGERY      right, skin graft    FLUOROSCOPY N/A 1/21/2020    Procedure: Y90 Mapping;  Surgeon: Ar CHUN  MD Jitendra;  Location: [facility] CATH LAB;  Service: General;  Laterality: N/A;    FLUOROSCOPY N/A 2/4/2020    Procedure: TheraSphere Liver Mass;  Surgeon: Ar Ham MD;  Location: [facility] CATH LAB;  Service: General;  Laterality: N/A;    SPINAL FUSION      ACDF 05/04/2010    TONSILLECTOMY         Labs:  Lab Results   Component Value Date    WBC 11.28 02/05/2020    HGB 10.9 (L) 02/05/2020    HCT 34.4 (L) 02/05/2020    MCV 87 02/05/2020     (H) 02/05/2020     BMP  Lab Results   Component Value Date     02/05/2020    K 4.0 02/05/2020     02/05/2020    CO2 24 02/05/2020    BUN 7 (L) 02/05/2020    CREATININE 0.8 02/05/2020    CALCIUM 8.9 02/05/2020    ANIONGAP 9 02/05/2020    ESTGFRAFRICA >60 02/05/2020    EGFRNONAA >60 02/05/2020     Lab Results   Component Value Date    ALT 15 02/05/2020    AST 40 02/05/2020    ALKPHOS 153 (H) 02/05/2020    BILITOT 0.8 02/05/2020       No results found for: IRON, TIBC, FERRITIN, SATURATEDIRO  No results found for: [id number]  No results found for: FOLATE  No results found for: TSH      Review of Systems   Constitutional: Positive for activity change, appetite change, fatigue and unexpected weight change. Negative for chills, diaphoresis and fever.   HENT: Negative for congestion, dental problem, drooling, ear discharge, ear pain, facial swelling, hearing loss, mouth sores, nosebleeds, postnasal drip, rhinorrhea, sinus pressure, sneezing, sore throat, tinnitus, trouble swallowing and voice change.    Eyes: Negative for photophobia, pain, discharge, redness, itching and visual disturbance.   Respiratory: Negative for apnea, cough, choking, chest tightness, shortness of breath, wheezing and stridor.    Cardiovascular: Negative for chest pain, palpitations and leg swelling.   Gastrointestinal: Positive for abdominal distention and abdominal pain. Negative for anal bleeding, blood in stool, constipation, diarrhea, nausea, rectal pain and vomiting.   Endocrine: Negative  for cold intolerance, heat intolerance, polydipsia, polyphagia and polyuria.   Genitourinary: Negative for decreased urine volume, difficulty urinating, discharge, dysuria, enuresis, flank pain, frequency, genital sores, hematuria, penile pain, penile swelling, scrotal swelling, testicular pain and urgency.   Musculoskeletal: Negative for arthralgias, back pain, gait problem, joint swelling, myalgias, neck pain and neck stiffness.   Skin: Negative for color change, pallor, rash and wound.   Allergic/Immunologic: Negative for environmental allergies, food allergies and immunocompromised state.   Neurological: Positive for weakness. Negative for dizziness, tremors, seizures, syncope, facial asymmetry, speech difficulty, light-headedness, numbness and headaches.   Hematological: Negative for adenopathy. Does not bruise/bleed easily.   Psychiatric/Behavioral: Positive for dysphoric mood. Negative for agitation, behavioral problems, confusion, decreased concentration, hallucinations, self-injury, sleep disturbance and suicidal ideas. The patient is nervous/anxious. The patient is not hyperactive.        Objective:      Physical Exam   Constitutional: He is oriented to person, place, and time. He appears cachectic. He has a sickly appearance. He appears ill. He appears distressed.   HENT:   Head: Normocephalic.   Right Ear: External ear normal.   Left Ear: External ear normal.   Nose: Nose normal. Right sinus exhibits no maxillary sinus tenderness and no frontal sinus tenderness. Left sinus exhibits no maxillary sinus tenderness and no frontal sinus tenderness.   Mouth/Throat: Oropharynx is clear and moist. No oropharyngeal exudate.   Eyes: Pupils are equal, round, and reactive to light. EOM and lids are normal. Right eye exhibits no discharge. Left eye exhibits no discharge. Right conjunctiva is not injected. Right conjunctiva has no hemorrhage. Left conjunctiva is not injected. Left conjunctiva has no hemorrhage. No  scleral icterus. Right eye exhibits normal extraocular motion. Left eye exhibits normal extraocular motion.   Neck: Normal range of motion. Neck supple. No JVD present. No tracheal deviation present. No thyromegaly present.   Cardiovascular: Normal rate and regular rhythm.   Pulmonary/Chest: Effort normal. No stridor. No respiratory distress.   Abdominal: Soft. Bowel sounds are normal. He exhibits no mass. There is no hepatosplenomegaly, splenomegaly or hepatomegaly. There is no tenderness.       Musculoskeletal: Normal range of motion. He exhibits no edema or tenderness.   Lymphadenopathy:        Head (right side): No posterior auricular and no occipital adenopathy present.        Head (left side): No posterior auricular and no occipital adenopathy present.     He has no cervical adenopathy.        Right cervical: No superficial cervical, no deep cervical and no posterior cervical adenopathy present.       Left cervical: No superficial cervical, no deep cervical and no posterior cervical adenopathy present.     He has no axillary adenopathy.        Right: No supraclavicular adenopathy present.        Left: No supraclavicular adenopathy present.   Neurological: He is alert and oriented to person, place, and time. He has normal strength. No cranial nerve deficit. Coordination normal.   Skin: Skin is dry. No rash noted. He is not diaphoretic. No cyanosis or erythema. Nails show no clubbing.   Psychiatric: He has a normal mood and affect. His behavior is normal. Judgment and thought content normal. Cognition and memory are normal.   Vitals reviewed.          Assessment:      1. HCC (hepatocellular carcinoma)    2. Neoplasm of abdomen    3. Portal vein thrombosis    4. Liver mass           Plan:     Extensive conversation reviewed situation with weight loss increasing abdominal pain will start patient off on MS Contin 30 mg Q 12 hr escalated Q 8 hr if needed as well as Mauro a codon p.r.n. breakthrough pain Q 4 hr bowel  for full axis discussed.  In addition will proceed with CBC CMP and alpha fetoprotein today along with CT chest abdomen pelvis and return in the next 48 hr referral made to ambulatory palliative care. .Advance Care Planning  patient is seen urgently in the 25 min face-to-face time coordination of care greater 50% time face-to-face with patient               Jarod Kevin Jr, MD FACP

## 2020-02-26 ENCOUNTER — OFFICE VISIT (OUTPATIENT)
Dept: HEMATOLOGY/ONCOLOGY | Facility: CLINIC | Age: 80
End: 2020-02-26
Payer: MEDICARE

## 2020-02-26 ENCOUNTER — SOCIAL WORK (OUTPATIENT)
Dept: HEMATOLOGY/ONCOLOGY | Facility: CLINIC | Age: 80
End: 2020-02-26

## 2020-02-26 ENCOUNTER — DOCUMENTATION ONLY (OUTPATIENT)
Dept: HEMATOLOGY/ONCOLOGY | Facility: CLINIC | Age: 80
End: 2020-02-26

## 2020-02-26 VITALS
BODY MASS INDEX: 25.25 KG/M2 | HEART RATE: 84 BPM | HEIGHT: 70 IN | WEIGHT: 176.38 LBS | TEMPERATURE: 98 F | SYSTOLIC BLOOD PRESSURE: 148 MMHG | DIASTOLIC BLOOD PRESSURE: 71 MMHG

## 2020-02-26 DIAGNOSIS — C22.0 HCC (HEPATOCELLULAR CARCINOMA): ICD-10-CM

## 2020-02-26 DIAGNOSIS — C22.0 HEPATOCELLULAR CARCINOMA: Primary | ICD-10-CM

## 2020-02-26 DIAGNOSIS — R94.6 ABNORMAL RESULTS OF THYROID FUNCTION STUDIES: ICD-10-CM

## 2020-02-26 PROCEDURE — 99213 OFFICE O/P EST LOW 20 MIN: CPT | Mod: PBBFAC | Performed by: INTERNAL MEDICINE

## 2020-02-26 PROCEDURE — 99999 PR PBB SHADOW E&M-EST. PATIENT-LVL III: ICD-10-PCS | Mod: PBBFAC,,, | Performed by: INTERNAL MEDICINE

## 2020-02-26 PROCEDURE — 99215 OFFICE O/P EST HI 40 MIN: CPT | Mod: S$PBB,,, | Performed by: INTERNAL MEDICINE

## 2020-02-26 PROCEDURE — 99999 PR PBB SHADOW E&M-EST. PATIENT-LVL III: CPT | Mod: PBBFAC,,, | Performed by: INTERNAL MEDICINE

## 2020-02-26 PROCEDURE — 99215 PR OFFICE/OUTPT VISIT, EST, LEVL V, 40-54 MIN: ICD-10-PCS | Mod: S$PBB,,, | Performed by: INTERNAL MEDICINE

## 2020-02-26 RX ORDER — SODIUM CHLORIDE 0.9 % (FLUSH) 0.9 %
10 SYRINGE (ML) INJECTION
Status: CANCELLED | OUTPATIENT
Start: 2020-03-03

## 2020-02-26 RX ORDER — HEPARIN 100 UNIT/ML
500 SYRINGE INTRAVENOUS
Status: CANCELLED | OUTPATIENT
Start: 2020-03-03

## 2020-02-26 NOTE — NURSING
Met with patient and wife today in person with dr griselda cordova.  Together we set up NP education for Opdivo, palliative care appt, rad/onc consult appt, LAb MD and chemo infusion appts. He will see the  prior to teaching session and he prefers all of his appts to be a the cancer center location. Informed consent obtained and scanned into media tab of epic chart. Insurance authorization obtained today from nadja in referral center.  Sofía in with pat today as well and I notified her of start date. Pharmacy notified of new drug start as well. My role as nurse navigator reviewed and my direct contact number as well as after hours number given to them. They verify intent to attend all apts as set up and will call with any further concerns.   Oncology Navigation   Intake  MD Assigned: griselda  Initial Nurse Navigator Contact: 19  Contact Method: Phone  Date Worked: 20  Reason if booked > 7 days after scheduling: Other  Other reason booked > 7 days: MD request to have patient back to discuss possible biopsy  Reason for Treatment Delay: Further work-up  Further Work-Up: biopsy     Treatment  Current Status: Staging work-up  Treatment Type(s): Other (Comment)  Other Treatment: Opdivo    Procedures: Biopsy  Biopsy Schedule Date: 19       Support Systems: Spouse/significant other  Barriers of Care: Comorbidities  Comorbidities: blood thinners     Acuity  Systemic Treatment - predicted or initiated: Other (+1)  Treatment Tolerability: 1  ECO  Comorbidities in Medical History: 2   Needed: 0  Verbalizes Financial Concerns: 0  Transportation: 0  Verbalizes the need for more education: 1  Navigation Acuity: 7     Follow Up  No follow-ups on file.

## 2020-02-26 NOTE — PROGRESS NOTES
Met with pt and his wife to f/u as planned per yesterday. Pt is smiling and in no visible distress. Pt's wife is tearful and jittery. He furnished receipt for hotel which will be submitted for reimbursement through [employer] patient assistance fund. He also inquired about Cancer Services and a referral was made. Made referral to [employer], as well, to assist in the future with potential lodging needs. Discussed how obtaining lodging through [employer] would work.     Provided emotional support, primarily to pt's wife. We discussed taking things a day at a time, a treatment at a time. Will provide as much support and assistance as SW is able to. Pt expressed gratitude. Will plan to f/u with pt again when he RTC, or sooner, if needed/requested.

## 2020-02-26 NOTE — PROGRESS NOTES
Subjective:       Patient ID: Ottoniel Arellano Jr. is a 79 y.o. male.    Chief Complaint: Results and Cancer    HPI 79-year-old male returns for review of laboratory studies and CT chest abdomen pelvis for possible progression of hepatocellular carcinoma ECOG status 2 accompanied by his wife    Past Medical History:   Diagnosis Date    Arthritis     Coronary artery disease     HCC (hepatocellular carcinoma)      History reviewed. No pertinent family history.  Social History     Socioeconomic History    Marital status:      Spouse name: Not on file    Number of children: Not on file    Years of education: Not on file    Highest education level: Not on file   Occupational History    Not on file   Social Needs    Financial resource strain: Not on file    Food insecurity:     Worry: Not on file     Inability: Not on file    Transportation needs:     Medical: Not on file     Non-medical: Not on file   Tobacco Use    Smoking status: Former Smoker    Smokeless tobacco: Former User     Quit date: 5/18/1986   Substance and Sexual Activity    Alcohol use: No    Drug use: Not on file    Sexual activity: Not on file   Lifestyle    Physical activity:     Days per week: Not on file     Minutes per session: Not on file    Stress: Not on file   Relationships    Social connections:     Talks on phone: Not on file     Gets together: Not on file     Attends Cheondoism service: Not on file     Active member of club or organization: Not on file     Attends meetings of clubs or organizations: Not on file     Relationship status: Not on file   Other Topics Concern    Not on file   Social History Narrative    Not on file     Past Surgical History:   Procedure Laterality Date    ANGIOGRAM, CORONARY, WITH LEFT HEART CATHETERIZATION      stents 12/29/2012    EYE SURGERY      right, skin graft    FLUOROSCOPY N/A 1/21/2020    Procedure: Y90 Mapping;  Surgeon: Ar Ham MD;  Location: [facility] CATH LAB;  Service:  General;  Laterality: N/A;    FLUOROSCOPY N/A 2/4/2020    Procedure: TheraSphere Liver Mass;  Surgeon: Ar Ham MD;  Location: [facility] CATH LAB;  Service: General;  Laterality: N/A;    SPINAL FUSION      ACDF 05/04/2010    TONSILLECTOMY         Labs:  Lab Results   Component Value Date    WBC 6.19 02/25/2020    HGB 12.9 (L) 02/25/2020    HCT 39.1 (L) 02/25/2020    MCV 88 02/25/2020     02/25/2020     BMP  Lab Results   Component Value Date     02/25/2020    K 4.6 02/25/2020    CL 97 02/25/2020    CO2 28 02/25/2020    BUN 9 02/25/2020    CREATININE 0.8 02/25/2020    CALCIUM 10.1 02/25/2020    ANIONGAP 12 02/25/2020    ESTGFRAFRICA >60 02/25/2020    EGFRNONAA >60 02/25/2020     Lab Results   Component Value Date    ALT 19 02/25/2020    AST 58 (H) 02/25/2020    ALKPHOS 192 (H) 02/25/2020    BILITOT 0.9 02/25/2020       No results found for: IRON, TIBC, FERRITIN, SATURATEDIRO  No results found for: [id number]  No results found for: FOLATE  No results found for: TSH      Review of Systems   Constitutional: Positive for activity change, appetite change, fatigue and unexpected weight change. Negative for chills, diaphoresis and fever.   HENT: Negative for congestion, dental problem, drooling, ear discharge, ear pain, facial swelling, hearing loss, mouth sores, nosebleeds, postnasal drip, rhinorrhea, sinus pressure, sneezing, sore throat, tinnitus, trouble swallowing and voice change.    Eyes: Negative for photophobia, pain, discharge, redness, itching and visual disturbance.   Respiratory: Negative for apnea, cough, choking, chest tightness, shortness of breath, wheezing and stridor.    Cardiovascular: Negative for chest pain, palpitations and leg swelling.   Gastrointestinal: Positive for abdominal distention and abdominal pain. Negative for anal bleeding, blood in stool, constipation, diarrhea, nausea, rectal pain and vomiting.   Endocrine: Negative for cold intolerance, heat intolerance, polydipsia,  polyphagia and polyuria.   Genitourinary: Negative for decreased urine volume, difficulty urinating, discharge, dysuria, enuresis, flank pain, frequency, genital sores, hematuria, penile pain, penile swelling, scrotal swelling, testicular pain and urgency.   Musculoskeletal: Negative for arthralgias, back pain, gait problem, joint swelling, myalgias, neck pain and neck stiffness.   Skin: Negative for color change, pallor, rash and wound.   Allergic/Immunologic: Negative for environmental allergies, food allergies and immunocompromised state.   Neurological: Positive for weakness. Negative for dizziness, tremors, seizures, syncope, facial asymmetry, speech difficulty, light-headedness, numbness and headaches.   Hematological: Negative for adenopathy. Does not bruise/bleed easily.   Psychiatric/Behavioral: Positive for dysphoric mood. Negative for agitation, behavioral problems, confusion, decreased concentration, hallucinations, self-injury, sleep disturbance and suicidal ideas. The patient is nervous/anxious. The patient is not hyperactive.        Objective:      Physical Exam   Constitutional: He is oriented to person, place, and time. He appears cachectic. He has a sickly appearance. He appears ill. He appears distressed.   HENT:   Head: Normocephalic.   Right Ear: External ear normal.   Left Ear: External ear normal.   Nose: Nose normal. Right sinus exhibits no maxillary sinus tenderness and no frontal sinus tenderness. Left sinus exhibits no maxillary sinus tenderness and no frontal sinus tenderness.   Mouth/Throat: Oropharynx is clear and moist. No oropharyngeal exudate.   Eyes: Pupils are equal, round, and reactive to light. EOM and lids are normal. Right eye exhibits no discharge. Left eye exhibits no discharge. Right conjunctiva is not injected. Right conjunctiva has no hemorrhage. Left conjunctiva is not injected. Left conjunctiva has no hemorrhage. No scleral icterus. Right eye exhibits normal extraocular  motion. Left eye exhibits normal extraocular motion.   Neck: Normal range of motion. Neck supple. No JVD present. No tracheal deviation present. No thyromegaly present.   Cardiovascular: Normal rate and regular rhythm.   Pulmonary/Chest: Effort normal. No stridor. No respiratory distress.   Abdominal: Soft. He exhibits no mass. There is no hepatosplenomegaly, splenomegaly or hepatomegaly. There is no tenderness.   Musculoskeletal: Normal range of motion. He exhibits no edema or tenderness.   Lymphadenopathy:        Head (right side): No posterior auricular and no occipital adenopathy present.        Head (left side): No posterior auricular and no occipital adenopathy present.     He has no cervical adenopathy.        Right cervical: No superficial cervical, no deep cervical and no posterior cervical adenopathy present.       Left cervical: No superficial cervical, no deep cervical and no posterior cervical adenopathy present.     He has no axillary adenopathy.        Right: No supraclavicular adenopathy present.        Left: No supraclavicular adenopathy present.   Neurological: He is alert and oriented to person, place, and time. He has normal strength. No cranial nerve deficit. Coordination normal.   Skin: Skin is dry. No rash noted. He is not diaphoretic. No cyanosis or erythema. Nails show no clubbing.   Psychiatric: His behavior is normal. Judgment and thought content normal. His mood appears anxious. Cognition and memory are normal. He exhibits a depressed mood.   Vitals reviewed.          Assessment:      1. Hepatocellular carcinoma    2. HCC (hepatocellular carcinoma)    3. Abnormal results of thyroid function studies            Plan:     Extensive conversation unfortunately results of laboratory studies demonstrate rapid increase in alpha fetoprotein CT chest abdomen pelvis demonstrates metastatic disease and retroperitoneal lymphadenopathy outside of liver.  At this time I have talked to the patient he has  a treatable but not curable malignancy referral has been made to advanced care planning an with palliative care.  Talked about treatment options I will ask Radiation Oncology to see whether not any focal radiation to his large retroperitoneal node would be of benefit.  We will start the patient systemically on nivolumab therapy recently reported data demonstrates improvement in response rates in setting with immunotherapy.  Discussed implications nurse navigator present 40 min face-to-face time coordination of care greater than 50% time face-to-face with patient and family.  Orders placed through the  [facility] Oncology pathway. Advance Care Planning                Jarod Kevin Jr, MD FACP

## 2020-02-27 ENCOUNTER — DOCUMENTATION ONLY (OUTPATIENT)
Dept: HEMATOLOGY/ONCOLOGY | Facility: CLINIC | Age: 80
End: 2020-02-27

## 2020-02-27 ENCOUNTER — OFFICE VISIT (OUTPATIENT)
Dept: HEMATOLOGY/ONCOLOGY | Facility: CLINIC | Age: 80
End: 2020-02-27
Payer: MEDICARE

## 2020-02-27 DIAGNOSIS — R10.13 EPIGASTRIC PAIN: ICD-10-CM

## 2020-02-27 DIAGNOSIS — I81 PORTAL VEIN THROMBOSIS SECONDARY TO HCC INVASION: ICD-10-CM

## 2020-02-27 DIAGNOSIS — I25.10 CORONARY ARTERY DISEASE INVOLVING NATIVE CORONARY ARTERY OF NATIVE HEART WITHOUT ANGINA PECTORIS: ICD-10-CM

## 2020-02-27 DIAGNOSIS — E43 SEVERE MALNUTRITION: ICD-10-CM

## 2020-02-27 DIAGNOSIS — C22.0 HEPATOCELLULAR CARCINOMA: Primary | ICD-10-CM

## 2020-02-27 DIAGNOSIS — I10 ESSENTIAL HYPERTENSION: ICD-10-CM

## 2020-02-27 DIAGNOSIS — C22.0 PORTAL VEIN THROMBOSIS SECONDARY TO HCC INVASION: ICD-10-CM

## 2020-02-27 PROCEDURE — 99215 PR OFFICE/OUTPT VISIT, EST, LEVL V, 40-54 MIN: ICD-10-PCS | Mod: S$PBB,,, | Performed by: NURSE PRACTITIONER

## 2020-02-27 PROCEDURE — 99999 PR PBB SHADOW E&M-EST. PATIENT-LVL II: ICD-10-PCS | Mod: PBBFAC,,, | Performed by: NURSE PRACTITIONER

## 2020-02-27 PROCEDURE — 99999 PR PBB SHADOW E&M-EST. PATIENT-LVL II: CPT | Mod: PBBFAC,,, | Performed by: NURSE PRACTITIONER

## 2020-02-27 PROCEDURE — 99215 OFFICE O/P EST HI 40 MIN: CPT | Mod: S$PBB,,, | Performed by: NURSE PRACTITIONER

## 2020-02-27 PROCEDURE — 99212 OFFICE O/P EST SF 10 MIN: CPT | Mod: PBBFAC | Performed by: NURSE PRACTITIONER

## 2020-02-27 NOTE — PATIENT INSTRUCTIONS
Nutrition During Chemotherapy     Drink plenty of liquids, such as water.     During chemotherapy, the energy provided by a healthy diet can help you rebuild normal cells. It can also help you keep up your strength and fight infection. As a result, you may feel better and be more able to cope with side effects. Ask your doctor about your nutrition needs.  Drink plenty of fluids  · Fluids help the body produce urine and decrease constipation. They help prevent kidney and bladder problems. They also help replace fluids lost from vomiting and diarrhea.  · Try water, unsweetened juices, and other flavored drinks without caffeine. They flush toxins from the body.  Get enough calories  · Calories are fuel. The body uses this fuel to perform all of its functions, including healing.  · Its OK to be lean, but be sure you are not underweight. If you are, try eating more calories.  · Eat calorie-dense foods such as avocados, peanut butter, eggs, and ice cream.  · If you need extra calories, add butter, gravy, and sauces to foods (if tolerated).  · If you don't need the extra calories, try to limit foods that are fried, greasy, or high in fat or added sugar.  Eat protein, fruits, and vegetables  · Protein builds muscle, bone, skin, and blood. It helps your body heal and fight infection. It also helps boost your energy level.  · Good protein choices include yogurt, eggs, chicken, lean meats, beans, and peanut butter.  · Fruits and vegetables are full of important vitamins, minerals, and fiber to help your body function properly.  · Try to eat a variety of vegetables, fruits, whole grains, and beans.  · Ask your doctor about instant protein powder or other supplements.  Eating right during treatment  Side effects may make it a little harder to eat well on some days. The following tips will help you continue to get the nutrition you need:  · Be open to new foods and recipes.  · Eat small portions often and slowly.  · Have a  healthy snack instead of a meal if you are not very hungry.  · Try eating in a new setting.  · Physical activity, such as walking, can help increase your appetite. Try to be active for at least 30 minutes each day.  · Boost your diet by getting the vitamins and minerals you need from fruits, vegetables, and whole grains.  · If you live alone and are not up to cooking, ask your healthcare provider about Meals on Wheels or other outreach programs.  · Sometimes, it is best to follow your appetitie. Eat when you are hungry, but when you ar enot, forcing yourself to eat can make you feel bad, nauseated, or even cause you to vomit.   Date Last Reviewed: 1/6/2016  © 5903-4448 [employer]. [address] [place] [address], [place] [address]. All rights reserved. This information is not intended as a substitute for professional medical care. Always follow your healthcare professional's instructions.

## 2020-02-27 NOTE — PROGRESS NOTES
Met with pt and his wife to f/u at their request. They were unsure of why they were here today. Discussed education on treatment by nurse practitioner. They appeared relieved that the patient would be on immunotherapy and not chemotherapy. Discussed that nurse practitioner would go over everything in detail today about what to expect and unfortunately all medications can come with some side effects. They report being a bit overwhelmed with phone calls; normalized and validated their feelings and let them know things should settle down once pt gets on his treatment regimen. Encouraged them to f/u with SW any time needed for support and assistance. Will continue to follow and assist as needs arise.

## 2020-02-27 NOTE — PROGRESS NOTES
80 y/o male for chemotherapy teaching. Pt given the Navigation Notebook. Explained how to use notebook. Discussed with pt  rationale for chemotherapy, how it works, the process of treatment, potential side effects and symptoms to report.     Reviewed the specific medication and gave them a handout describing the side effects of Opdivo.     Dx: Hepatocellular Carcinoma     Discussed potential side effects such as:  Nausea and vomiting  Myelosuppression  Fatigue  Anorexia  Alopecia  Stomatitis  Diarrhea  Cystitis  Gastritis  Fever > 100.5  Antiemetics instructions  Skin care  Constipation  Rash  Hyperpigmentation  Rash  Photosensitivity  Sunscreen  Small freq meals  Increased protein  Increased calories  Vitamin support   Taste alterations  Neuropathys  Hydration  Renal toxicity  Port management  Community resources  Thrombocytopenia precautions  Hand and foot syndrome- symptoms and self care tips  Importance of monitoring blood sugars if diabetic and reporting elevations or lows    Face to Face time spent with patient: >60  Minutes, time spent in education and support.

## 2020-02-28 ENCOUNTER — DOCUMENTATION ONLY (OUTPATIENT)
Dept: PHARMACY | Facility: HOSPITAL | Age: 80
End: 2020-02-28

## 2020-03-02 NOTE — PROGRESS NOTES
[facility] [place]  RADIATION ONCOLOGY CONSULTATION    Name: Ottoniel Arellano Jr.  : 1940      Patient Referred To Radiation Oncology By:  Dr. Jarod Kevin MD  [address] [place] [address]    DIAGNOSIS: hepatocellular carcinoma with retroperitoneal lymph nodes, stage IV    HISTORY OF PRESENT ILLNESS:  Ottoniel Arellano Jr. is a 79 y.o. male who presents for consultation for the above diagnosis. He had a portal vein thrombosis and elevated AFP. He was found to have a liver mass on MRI 19 concerning for HCC. Biopsy was negative for malignancy and showed scar. Liver mass remained stable on subsequent imaging but AFP was increasing. He was discussed at liver conference and determined to have HCC by imaging criteria.  He underwent Y90 radioembolization for the liver mass by Dr. Ham on 20.   AFP is now 3576, previously 1012 in 2019.  CT c/a/p 20 showed likely postembolization change in segment 4 liver lesion, bulky periportal adenopathy worsened from prior (largest node 2.8cm) and enlarged retroperitoneal node which is new and measuring 1.3cm.   Dr. Kevin started him on nivolumab today. He is also on MC Contin 30mg q12hrs for chronic pain.  Today, he still has abdominal pain and decreased appetite. His weight and appetite are improving. Denies jaundice, nausea, vomiting, diarrhea.    REVIEW OF SYSTEMS: (Positive findings bold, otherwise negative)   Constitutional: fever, fatigue, weight change  Eyes: blurred vision in the past 3 months, double vision   ENT: ear pain, new mouth lesions, jaw pain, difficulty swallowing, sore throat  Cardiovascular: chest pain on exertion, reflux, leg swelling  Respiratory: shortness of breath, dyspnea, cough, hemoptysis.   GI: abdominal pain, diarrhea, constipation, blood in stool, painful bowel movements  : painful or burning urination, blood in urine  Musculoskeletal: new bone or joint pains  Neurologic: headache, seizure,  focal numbness or tingling, balance changes, speech changes  Lymph: new or enlarged lymph nodes  Psychiatric: depression, anxiety    PRIOR RADIATION HISTORY: Y90 radioembolization 2/4/20    PAST MEDICAL HISTORY:  Past Medical History:   Diagnosis Date    Arthritis     Coronary artery disease     HCC (hepatocellular carcinoma)        PAST SURGICAL HISTORY:  Past Surgical History:   Procedure Laterality Date    ANGIOGRAM, CORONARY, WITH LEFT HEART CATHETERIZATION      stents 12/29/2012    EYE SURGERY      right, skin graft    FLUOROSCOPY N/A 1/21/2020    Procedure: Y90 Mapping;  Surgeon: Ar Ham MD;  Location[facility] CATH LAB;  Service: General;  Laterality: N/A;    FLUOROSCOPY N/A 2/4/2020    Procedure: TheraSphere Liver Mass;  Surgeon: Ar Ham MD;  Location: [facility] CATH LAB;  Service: General;  Laterality: N/A;    SPINAL FUSION      ACDF 05/04/2010    TONSILLECTOMY         ALLERGIES:   Review of patient's allergies indicates:   Allergen Reactions    Penicillins Hives and Rash    Shellfish containing products      Pt states face and hands turn blotchy red and itch       MEDICATIONS:    Current Outpatient Medications:     ascorbic acid (VITAMIN C) 500 MG tablet, Take 500 mg by mouth once daily., Disp: , Rfl:     aspirin 81 MG Chew, Take 81 mg by mouth once daily., Disp: , Rfl:     atorvastatin (LIPITOR) 40 MG tablet, Take 40 mg by mouth once daily. , Disp: , Rfl:     cinnamon bark 500 mg capsule, Take 1,000 mg by mouth once daily., Disp: , Rfl:     clonazePAM (KLONOPIN) 1 MG tablet, Take 1 tablet (1 mg total) by mouth On call Procedure for Anxiety. Take 1 tablet 1 hr before the MRI procedure, Disp: 1 tablet, Rfl: 0    diphenhydrAMINE (BENADRYL) 25 mg capsule, Take 50 mg by mouth nightly as needed for Itching., Disp: , Rfl:     fish oil-omega-3 fatty acids 300-1,000 mg capsule, Take 2 g by mouth once daily., Disp: , Rfl:     fluticasone propionate (FLONASE ALLERGY RELIEF) 50  mcg/actuation nasal spray, 1 spray by Each Nare route once daily., Disp: , Rfl:     meloxicam (MOBIC) 15 MG tablet, Take 15 mg by mouth daily as needed for Pain., Disp: , Rfl:     metoprolol succinate (TOPROL-XL) 25 MG 24 hr tablet, Take 25 mg by mouth once daily., Disp: , Rfl:     metoprolol tartrate (LOPRESSOR) 25 MG tablet, , Disp: , Rfl:     morphine (MS CONTIN) 30 MG 12 hr tablet, Take 1 tablet (30 mg total) by mouth every 8 (eight) hours., Disp: 90 tablet, Rfl: 0    NEXIUM 40 mg capsule, Take 40 mg by mouth daily as needed. , Disp: , Rfl:     ondansetron (ZOFRAN) 4 MG tablet, Take 1 tablet (4 mg total) by mouth every 8 (eight) hours as needed for Nausea., Disp: 30 tablet, Rfl: 11    oxyCODONE (ROXICODONE) 5 MG immediate release tablet, Take 1 tablet (5 mg total) by mouth every 4 (four) hours as needed for Pain. Medically necessary for more than 7 days, Disp: 90 tablet, Rfl: 0    promethazine (PHENERGAN) 12.5 MG Tab, Take 1 tablet (12.5 mg total) by mouth every 6 to 8 hours as needed., Disp: 80 tablet, Rfl: 0    tamsulosin (FLOMAX) 0.4 mg Cp24, Take 0.4 mg by mouth every evening. , Disp: , Rfl:     vitamin D (VITAMIN D3) 1000 units Tab, Take 1,000 Units by mouth., Disp: , Rfl:     vitamin E 400 unit Tab, Take 1 tablet by mouth once daily., Disp: , Rfl:     warfarin (COUMADIN) 5 MG tablet, Take 2 tablets by mouth on Tuesdays and Thursdays; and 1 tablet on all other days of the week., Disp: 40 tablet, Rfl: 3  No current facility-administered medications for this visit.     Facility-Administered Medications Ordered in Other Visits:     sodium chloride 0.9% 100 mL flush bag, , Intravenous, 1 time in Clinic/HOD, Jarod Kevin MD    SOCIAL HISTORY:  Social History     Socioeconomic History    Marital status:      Spouse name: Not on file    Number of children: Not on file    Years of education: Not on file    Highest education level: Not on file   Occupational History    Not on file  "  Social Needs    Financial resource strain: Not on file    Food insecurity:     Worry: Not on file     Inability: Not on file    Transportation needs:     Medical: Not on file     Non-medical: Not on file   Tobacco Use    Smoking status: Former Smoker    Smokeless tobacco: Former User     Quit date: 5/18/1986   Substance and Sexual Activity    Alcohol use: No    Drug use: Not on file    Sexual activity: Not on file   Lifestyle    Physical activity:     Days per week: Not on file     Minutes per session: Not on file    Stress: Not on file   Relationships    Social connections:     Talks on phone: Not on file     Gets together: Not on file     Attends Caodaism service: Not on file     Active member of club or organization: Not on file     Attends meetings of clubs or organizations: Not on file     Relationship status: Not on file   Other Topics Concern    Not on file   Social History Narrative    Not on file     Lives in [facility]    FAMILY HISTORY:  No family history on file.    PHYSICAL EXAMINATION:  Constitutional: well appearing, no acute distress, ECOG 1 - Ambulates, capable of light work  Vitals:    /69   Pulse 72   Temp 97.9 °F (36.6 °C)   Resp 18   Ht 5' 10" (1.778 m)   Wt 79.3 kg (174 lb 13.2 oz)   SpO2 98%   BMI 25.08 kg/m²   Eyes: sclera anicteric, EOMI, pupils equal, round and reactive to light  ENT: oral cavity without lesions, moist mucous membranes  Neck: trachea midline, neck supple  Lymphatic: no cervical, supraclavicular or axillary adenopathy  Cardiovascular: regular rate, no murmurs, no edema of the upper or lower extremities, radial pulse 2+  Respiratory: unlabored effort, clear to auscultation, no wheezes  Abdomen: soft, non-tender, no rigidity, no masses, no hepatomegaly    IMAGING AND LABORATORY FINDINGS: As per HPI; images reviewed personally.    ASSESSMENT: 79 y.o. male with HCC now with portal kvng mass and small retroperitoneal lymph node    PLAN: After " reviewing imaging, I would not favor treating his currently asymptomatic adenopathy with radiation. HCC is relatively radioresistant and delivering radiation higher than purely palliative doses could result in increased GI toxicity especially given his recent Y90 procedure. The retroperitoneal node is small and not causing any problems at this time and the periportal nodes are too close in proximity to treated liver. I would favor monitor his response on nivolumab and reserve palliative radiation for symptomatic progression.    We discussed the techniques, toxicities and indications of radiation and I answered the patient's questions to their apparent satisfaction.    I spent approximately 60 minutes reviewing the available records and evaluating the patient, out of which over 50% of the time was spent face to face with the patient in counseling and coordinating this patient's care.      Ruben Murray III, M.D.  Radiation Oncology  [facility] [address] [place] [address]  Ph: [phone]  [email or web address]

## 2020-03-03 ENCOUNTER — INFUSION (OUTPATIENT)
Dept: INFUSION THERAPY | Facility: HOSPITAL | Age: 80
End: 2020-03-03
Attending: INTERNAL MEDICINE
Payer: MEDICARE

## 2020-03-03 ENCOUNTER — DOCUMENTATION ONLY (OUTPATIENT)
Dept: HEMATOLOGY/ONCOLOGY | Facility: CLINIC | Age: 80
End: 2020-03-03

## 2020-03-03 ENCOUNTER — INITIAL CONSULT (OUTPATIENT)
Dept: RADIATION ONCOLOGY | Facility: CLINIC | Age: 80
End: 2020-03-03
Payer: MEDICARE

## 2020-03-03 ENCOUNTER — OFFICE VISIT (OUTPATIENT)
Dept: HEMATOLOGY/ONCOLOGY | Facility: CLINIC | Age: 80
End: 2020-03-03
Payer: MEDICARE

## 2020-03-03 VITALS
HEART RATE: 72 BPM | HEIGHT: 70 IN | SYSTOLIC BLOOD PRESSURE: 132 MMHG | DIASTOLIC BLOOD PRESSURE: 69 MMHG | TEMPERATURE: 98 F | WEIGHT: 174.81 LBS | BODY MASS INDEX: 25.03 KG/M2

## 2020-03-03 VITALS
RESPIRATION RATE: 18 BRPM | HEIGHT: 70 IN | OXYGEN SATURATION: 98 % | DIASTOLIC BLOOD PRESSURE: 69 MMHG | HEART RATE: 72 BPM | SYSTOLIC BLOOD PRESSURE: 132 MMHG | WEIGHT: 174.81 LBS | BODY MASS INDEX: 25.03 KG/M2 | TEMPERATURE: 98 F

## 2020-03-03 DIAGNOSIS — C22.0 PORTAL VEIN THROMBOSIS SECONDARY TO HCC INVASION: ICD-10-CM

## 2020-03-03 DIAGNOSIS — R10.10 PAIN OF UPPER ABDOMEN: ICD-10-CM

## 2020-03-03 DIAGNOSIS — C22.0 HCC (HEPATOCELLULAR CARCINOMA): Primary | ICD-10-CM

## 2020-03-03 DIAGNOSIS — C22.0 HEPATOCELLULAR CARCINOMA: Primary | ICD-10-CM

## 2020-03-03 DIAGNOSIS — I81 PORTAL VEIN THROMBOSIS SECONDARY TO HCC INVASION: ICD-10-CM

## 2020-03-03 DIAGNOSIS — Z51.11 CHEMOTHERAPY MANAGEMENT, ENCOUNTER FOR: ICD-10-CM

## 2020-03-03 PROCEDURE — 96413 CHEMO IV INFUSION 1 HR: CPT

## 2020-03-03 PROCEDURE — 63600175 PHARM REV CODE 636 W HCPCS: Performed by: INTERNAL MEDICINE

## 2020-03-03 PROCEDURE — 99999 PR PBB SHADOW E&M-EST. PATIENT-LVL V: CPT | Mod: PBBFAC,,, | Performed by: RADIOLOGY

## 2020-03-03 PROCEDURE — 99999 PR PBB SHADOW E&M-EST. PATIENT-LVL V: ICD-10-PCS | Mod: PBBFAC,,, | Performed by: RADIOLOGY

## 2020-03-03 PROCEDURE — 99999 PR PBB SHADOW E&M-EST. PATIENT-LVL III: CPT | Mod: PBBFAC,,, | Performed by: INTERNAL MEDICINE

## 2020-03-03 PROCEDURE — 99999 PR PBB SHADOW E&M-EST. PATIENT-LVL III: ICD-10-PCS | Mod: PBBFAC,,, | Performed by: INTERNAL MEDICINE

## 2020-03-03 PROCEDURE — 99205 PR OFFICE/OUTPT VISIT, NEW, LEVL V, 60-74 MIN: ICD-10-PCS | Mod: S$PBB,,, | Performed by: RADIOLOGY

## 2020-03-03 PROCEDURE — 99213 OFFICE O/P EST LOW 20 MIN: CPT | Mod: PBBFAC,25 | Performed by: INTERNAL MEDICINE

## 2020-03-03 PROCEDURE — 99215 OFFICE O/P EST HI 40 MIN: CPT | Mod: 25,S$PBB,, | Performed by: INTERNAL MEDICINE

## 2020-03-03 PROCEDURE — 99215 OFFICE O/P EST HI 40 MIN: CPT | Mod: PBBFAC,27,25 | Performed by: RADIOLOGY

## 2020-03-03 PROCEDURE — 99215 PR OFFICE/OUTPT VISIT, EST, LEVL V, 40-54 MIN: ICD-10-PCS | Mod: 25,S$PBB,, | Performed by: INTERNAL MEDICINE

## 2020-03-03 PROCEDURE — 99205 OFFICE O/P NEW HI 60 MIN: CPT | Mod: S$PBB,,, | Performed by: RADIOLOGY

## 2020-03-03 RX ADMIN — SODIUM CHLORIDE 240 MG: 9 INJECTION, SOLUTION INTRAVENOUS at 10:03

## 2020-03-03 NOTE — PROGRESS NOTES
Davey met with patient and family in office. Pt requested gas card to assist with his transportation to and from his many appointments. Sw provided patient $50 gas card to assist. Davey will continue to f/u.

## 2020-03-03 NOTE — PROGRESS NOTES
Subjective:       Patient ID: Ottoniel Arellano Jr. is a 79 y.o. male.    Chief Complaint: Results; Cancer; and Chemotherapy    HPI 79-year-old male presents for initiation of Nivolamab therapy for metastatic hepatocellular carcinoma.  Patient currently is on MS Contin 30 mg q.12 hours with escalation to q.8 hours.  For chronic pain. ECOG status 2 accompanied by wife and son from [place]    Past Medical History:   Diagnosis Date    Arthritis     Coronary artery disease     HCC (hepatocellular carcinoma)      History reviewed. No pertinent family history.  Social History     Socioeconomic History    Marital status:      Spouse name: Not on file    Number of children: Not on file    Years of education: Not on file    Highest education level: Not on file   Occupational History    Not on file   Social Needs    Financial resource strain: Not on file    Food insecurity:     Worry: Not on file     Inability: Not on file    Transportation needs:     Medical: Not on file     Non-medical: Not on file   Tobacco Use    Smoking status: Former Smoker    Smokeless tobacco: Former User     Quit date: 5/18/1986   Substance and Sexual Activity    Alcohol use: No    Drug use: Not on file    Sexual activity: Not on file   Lifestyle    Physical activity:     Days per week: Not on file     Minutes per session: Not on file    Stress: Not on file   Relationships    Social connections:     Talks on phone: Not on file     Gets together: Not on file     Attends Confucianist service: Not on file     Active member of club or organization: Not on file     Attends meetings of clubs or organizations: Not on file     Relationship status: Not on file   Other Topics Concern    Not on file   Social History Narrative    Not on file     Past Surgical History:   Procedure Laterality Date    ANGIOGRAM, CORONARY, WITH LEFT HEART CATHETERIZATION      stents 12/29/2012    EYE SURGERY      right, skin graft    FLUOROSCOPY N/A  1/21/2020    Procedure: Y90 Mapping;  Surgeon: Ar Ham MD;  Location: [facility] CATH LAB;  Service: General;  Laterality: N/A;    FLUOROSCOPY N/A 2/4/2020    Procedure: TheraSphere Liver Mass;  Surgeon: Ar Ham MD;  Location: [facility] CATH LAB;  Service: General;  Laterality: N/A;    SPINAL FUSION      ACDF 05/04/2010    TONSILLECTOMY         Labs:  Lab Results   Component Value Date    WBC 6.61 03/03/2020    HGB 12.3 (L) 03/03/2020    HCT 38.9 (L) 03/03/2020    MCV 88 03/03/2020     03/03/2020     BMP  Lab Results   Component Value Date     03/03/2020    K 5.0 03/03/2020    CL 99 03/03/2020    CO2 25 03/03/2020    BUN 9 03/03/2020    CREATININE 1.1 03/03/2020    CALCIUM 10.6 (H) 03/03/2020    ANIONGAP 13 03/03/2020    ESTGFRAFRICA >60 03/03/2020    EGFRNONAA >60 03/03/2020     Lab Results   Component Value Date    ALT 16 03/03/2020    AST 50 (H) 03/03/2020    ALKPHOS 173 (H) 03/03/2020    BILITOT 1.1 (H) 03/03/2020       No results found for: IRON, TIBC, FERRITIN, SATURATEDIRO  No results found for: [id number]  No results found for: FOLATE  Lab Results   Component Value Date    TSH 1.941 03/03/2020         Review of Systems   Constitutional: Positive for activity change, appetite change, fatigue and unexpected weight change. Negative for chills, diaphoresis and fever.   HENT: Negative for congestion, dental problem, drooling, ear discharge, ear pain, facial swelling, hearing loss, mouth sores, nosebleeds, postnasal drip, rhinorrhea, sinus pressure, sneezing, sore throat, tinnitus, trouble swallowing and voice change.    Eyes: Negative for photophobia, pain, discharge, redness, itching and visual disturbance.   Respiratory: Negative for apnea, cough, choking, chest tightness, shortness of breath, wheezing and stridor.    Cardiovascular: Negative for chest pain, palpitations and leg swelling.   Gastrointestinal: Positive for abdominal pain. Negative for abdominal distention, anal bleeding,  blood in stool, constipation, diarrhea, nausea, rectal pain and vomiting.   Endocrine: Negative for cold intolerance, heat intolerance, polydipsia, polyphagia and polyuria.   Genitourinary: Negative for decreased urine volume, difficulty urinating, discharge, dysuria, enuresis, flank pain, frequency, genital sores, hematuria, penile pain, penile swelling, scrotal swelling, testicular pain and urgency.   Musculoskeletal: Negative for arthralgias, back pain, gait problem, joint swelling, myalgias, neck pain and neck stiffness.   Skin: Negative for color change, pallor, rash and wound.   Allergic/Immunologic: Negative for environmental allergies, food allergies and immunocompromised state.   Neurological: Positive for weakness. Negative for dizziness, tremors, seizures, syncope, facial asymmetry, speech difficulty, light-headedness, numbness and headaches.   Hematological: Negative for adenopathy. Does not bruise/bleed easily.   Psychiatric/Behavioral: Positive for dysphoric mood. Negative for agitation, behavioral problems, confusion, decreased concentration, hallucinations, self-injury, sleep disturbance and suicidal ideas. The patient is nervous/anxious. The patient is not hyperactive.        Objective:      Physical Exam   Constitutional: He is oriented to person, place, and time. He appears cachectic. He has a sickly appearance. He appears ill. He appears distressed.   HENT:   Head: Normocephalic.   Right Ear: External ear normal.   Left Ear: External ear normal.   Nose: Nose normal. Right sinus exhibits no maxillary sinus tenderness and no frontal sinus tenderness. Left sinus exhibits no maxillary sinus tenderness and no frontal sinus tenderness.   Mouth/Throat: Oropharynx is clear and moist. No oropharyngeal exudate.   Eyes: Pupils are equal, round, and reactive to light. EOM and lids are normal. Right eye exhibits no discharge. Left eye exhibits no discharge. Right conjunctiva is not injected. Right conjunctiva  has no hemorrhage. Left conjunctiva is not injected. Left conjunctiva has no hemorrhage. No scleral icterus. Right eye exhibits normal extraocular motion. Left eye exhibits normal extraocular motion.   Neck: Normal range of motion. Neck supple. No JVD present. No tracheal deviation present. No thyromegaly present.   Cardiovascular: Normal rate and regular rhythm.   Pulmonary/Chest: Effort normal. No stridor. No respiratory distress.   Abdominal: Soft. He exhibits no mass. There is no hepatosplenomegaly, splenomegaly or hepatomegaly. There is no tenderness.   Musculoskeletal: Normal range of motion. He exhibits no edema or tenderness.   Lymphadenopathy:        Head (right side): No posterior auricular and no occipital adenopathy present.        Head (left side): No posterior auricular and no occipital adenopathy present.     He has no cervical adenopathy.        Right cervical: No superficial cervical, no deep cervical and no posterior cervical adenopathy present.       Left cervical: No superficial cervical, no deep cervical and no posterior cervical adenopathy present.     He has no axillary adenopathy.        Right: No supraclavicular adenopathy present.        Left: No supraclavicular adenopathy present.   Neurological: He is alert and oriented to person, place, and time. He has normal strength. No cranial nerve deficit. Coordination normal.   Skin: Skin is dry. No rash noted. He is not diaphoretic. No cyanosis or erythema. Nails show no clubbing.   Psychiatric: His behavior is normal. Judgment and thought content normal. His mood appears anxious. Cognition and memory are normal. He exhibits a depressed mood.   Vitals reviewed.          Assessment:      1. Hepatocellular carcinoma    2. Pain of upper abdomen    3. Portal vein thrombosis secondary to HCC invasion    4. Chemotherapy management, encounter for           Plan:   Extensive conversation with patient wife and son reviewed imaging article from up-to-date  Center son discussed need for advanced care planning palliative care will be initiation of systemic therapy with nivolumab explain rationale side effect profile.  Consent form signed will initiate therapy today in treatable but not curable modality.Advance Care Planning                  Jarod Kevin Jr, MD FACP

## 2020-03-03 NOTE — DISCHARGE INSTRUCTIONS
[facility]  [address] [facility]  [address]  [phone] phone     [phone] fax  Hours of Operation: Monday- Friday 8:00am- 5:00pm  After hours phone  [phone]  Hematology / Oncology Physicians on call      Dr. Herberth Vicente, TJ Eaton NP Tyesha Taylor, NP    Please call with any concerns regarding your appointment today.FALL PREVENTION   Falls often occur due to slipping, tripping or losing your balance. Here are ways to reduce your risk of falling again.   Was there anything that caused your fall that can be fixed, removed or replaced?   Make your home safe by keeping walkways clear of objects you may trip over.   Use non-slip pads under rugs.   Do not walk in poorly lit areas.   Do not stand on chairs or wobbly ladders.   Use caution when reaching overhead or looking upward. This position can cause a loss of balance.   Be sure your shoes fit properly, have non-slip bottoms and are in good condition.   Be cautious when going up and down stairs, curbs, and when walking on uneven sidewalks.   If your balance is poor, consider using a cane or walker.   If your fall was related to alcohol use, stop or limit alcohol intake.   If your fall was related to use of sleeping medicines, talk to your doctor about this. You may need to reduce your dosage at bedtime if you awaken during the night to go to the bathroom.   To reduce the need for nighttime bathroom trips:   Avoid drinking fluids for several hours before going to bed   Empty your bladder before going to bed   Men can keep a urinal at the bedside   © 8319-0483 [facility] [facility], [address] [place] [address], [place] [address]. All rights reserved. This information is not intended as a substitute for professional medical care. Always follow your healthcare professional's instructions.  Support Groups/Classes    Support groups  "and classes are being offered at the   [facility] and [facility]!!    "Cooking with Cancer" (Nutrition Class):  Second Wednesday of each month   at noon at the Cancer Center.  Metastatic Support Group:  Third Tuesday of each month   at noon at the Cancer Center.  Next Steps Class/Group: Second and fourth Thursday of each month at noon at the Cancer Center.  [facility] (Breast Cancer Support Group): First Tuesday of each month   at 5:30pm at the [facility] location.  [employer]: [facility]: Second and third Tuesday of each month from 7:30am - 2pm.  [facility]: First and fourth Tuesday of each month from 7:30am - 2pm    If you are interested in attending or would like more information please ask our social workers or your nurse!WAYS TO HELP PREVENT INFECTION         WASH YOUR HANDS OFTEN DURING THE DAY, ESPECIALLY BEFORE YOU EAT, AFTER USING THE BATHROOM, AND AFTER TOUCHING ANIMALS     STAY AWAY FROM PEOPLE WHO HAVE ILLNESSES YOU CAN CATCH; SUCH AS COLDS, FLU, CHICKEN POX     TRY TO AVOID CROWDS     STAY AWAY FROM CHILDREN WHO RECENTLY HAVE RECEIVED LIVE VIRUS VACCINES     MAINTAIN GOOD MOUTH CARE     DO NOT SQUEEZE OR SCRATCH PIMPLES     CLEAN CUTS & SCRAPES RIGHT AWAY AND DAILY UNTIL HEALED WITH WARM WATER, SOAP & AN ANTISEPTIC     AVOID CONTACT WITH LITTER BOXES, BIRD CAGES, & FISH TANKS     AVOID STANDING WATER, IE., BIRD BATHS, FLOWER POTS/VASES, OR HUMIDIFIERS     WEAR GLOVES WHEN GARDENING OR CLEANING UP AFTER OTHERS, ESPECIALLY BABIES & SMALL CHILDREN    DO NOT EAT RAW FISH, SEAFOOD, MEAT, OR EGGS  "

## 2020-03-03 NOTE — LETTER
March 3, 2020      Jarod Kevin MD  [address]  [place] [address]            [facility]  [address] [address]  Phone: [phone]  Fax: [phone]          Patient: Ottoniel Arellano Jr.   MR Number: [id number]   YOB: 1940   Date of Visit: 3/3/2020       Dear Dr. Jarod Kevin:    Thank you for referring Ottoniel Arellano to me for evaluation. Attached you will find relevant portions of my assessment and plan of care.    If you have questions, please do not hesitate to call me. I look forward to following Ottoniel Arellano along with you.    Sincerely,    Ruben Murray III, MD    Enclosure  CC:  No Recipients    If you would like to receive this communication electronically, please contact [email or web address] or [phone] to request more information on [employer] Link access.    For providers and/or their staff who would like to refer a patient to [facility], please contact us through our one-stop-shop provider referral line, [facility], at [phone].    If you feel you have received this communication in error or would no longer like to receive these types of communications, please e-mail [email or web address]

## 2020-03-03 NOTE — PLAN OF CARE
Problem: Adult Inpatient Plan of Care  Goal: Plan of Care Review  Outcome: Ongoing, Progressing  Flowsheets (Taken 3/3/2020 1018)  Plan of Care Reviewed With: patient; spouse  Goal: Patient-Specific Goal (Individualization)  Outcome: Ongoing, Progressing  Flowsheets (Taken 3/3/2020 1018)  Individualized Care Needs: pt like pillow and orange juice  Anxieties, Fears or Concerns: a little anxious first day of treatment.   Pt stated feeling weak and tired today , a little anxious because his first day of treatment .

## 2020-03-05 ENCOUNTER — INITIAL CONSULT (OUTPATIENT)
Dept: PALLIATIVE MEDICINE | Facility: CLINIC | Age: 80
End: 2020-03-05
Payer: MEDICARE

## 2020-03-05 ENCOUNTER — SOCIAL WORK (OUTPATIENT)
Dept: PALLIATIVE MEDICINE | Facility: CLINIC | Age: 80
End: 2020-03-05

## 2020-03-05 VITALS
WEIGHT: 174.63 LBS | HEART RATE: 58 BPM | OXYGEN SATURATION: 97 % | DIASTOLIC BLOOD PRESSURE: 71 MMHG | BODY MASS INDEX: 25 KG/M2 | HEIGHT: 70 IN | TEMPERATURE: 98 F | SYSTOLIC BLOOD PRESSURE: 156 MMHG

## 2020-03-05 DIAGNOSIS — C22.0 HCC (HEPATOCELLULAR CARCINOMA): ICD-10-CM

## 2020-03-05 DIAGNOSIS — R16.0 LIVER MASS: ICD-10-CM

## 2020-03-05 DIAGNOSIS — D49.89 NEOPLASM OF ABDOMEN: ICD-10-CM

## 2020-03-05 DIAGNOSIS — G89.3 CANCER ASSOCIATED PAIN: ICD-10-CM

## 2020-03-05 DIAGNOSIS — R11.0 NAUSEA: ICD-10-CM

## 2020-03-05 DIAGNOSIS — G47.00 INSOMNIA, UNSPECIFIED TYPE: Primary | ICD-10-CM

## 2020-03-05 DIAGNOSIS — Z71.89 ACP (ADVANCE CARE PLANNING): ICD-10-CM

## 2020-03-05 DIAGNOSIS — I81 PORTAL VEIN THROMBOSIS: ICD-10-CM

## 2020-03-05 DIAGNOSIS — R63.0 POOR APPETITE: ICD-10-CM

## 2020-03-05 PROCEDURE — 99999 PR PBB SHADOW E&M-EST. PATIENT-LVL IV: CPT | Mod: PBBFAC,,, | Performed by: FAMILY MEDICINE

## 2020-03-05 PROCEDURE — 99204 PR OFFICE/OUTPT VISIT, NEW, LEVL IV, 45-59 MIN: ICD-10-PCS | Mod: S$PBB,,, | Performed by: FAMILY MEDICINE

## 2020-03-05 PROCEDURE — 99999 PR PBB SHADOW E&M-EST. PATIENT-LVL IV: ICD-10-PCS | Mod: PBBFAC,,, | Performed by: FAMILY MEDICINE

## 2020-03-05 PROCEDURE — 99214 OFFICE O/P EST MOD 30 MIN: CPT | Mod: PBBFAC | Performed by: FAMILY MEDICINE

## 2020-03-05 PROCEDURE — 99204 OFFICE O/P NEW MOD 45 MIN: CPT | Mod: S$PBB,,, | Performed by: FAMILY MEDICINE

## 2020-03-05 RX ORDER — METHADONE HYDROCHLORIDE 5 MG/1
5 TABLET ORAL EVERY 6 HOURS PRN
Qty: 60 TABLET | Refills: 0 | Status: SHIPPED | OUTPATIENT
Start: 2020-03-05 | End: 2020-04-07

## 2020-03-05 RX ORDER — ONDANSETRON 4 MG/1
4 TABLET, FILM COATED ORAL EVERY 8 HOURS PRN
Qty: 30 TABLET | Refills: 11
Start: 2020-03-05 | End: 2020-03-17

## 2020-03-05 RX ORDER — MIRTAZAPINE 7.5 MG/1
7.5-15 TABLET, FILM COATED ORAL NIGHTLY
Qty: 60 TABLET | Refills: 11 | Status: SHIPPED | OUTPATIENT
Start: 2020-03-05 | End: 2021-03-05

## 2020-03-05 NOTE — PROGRESS NOTES
"[facility]  Medical Specialty       Patient Name: Ottoniel Arellano Jr.  MRN: [id number]  Primary Care Physician: David Stevens MD    Advance Care Planning     Met with patient following visit with Dr. Cooper to continue ACP discussion. Patient's wife and son were also present. Patient completed HCPOA with Dr. Cooper and designated his wife and two sons as (Maddy Arellano, Az "Gus" Adan, and Marc Arellano) as HCPOA. We discussed LaPOST,and at this time patient states he does not want to be a DNR. However, he would not want to be on life support for an extended period of time if he was not expected to have any meaningful recovery.      HCPOA, Living Will, and LaPOST completed, scanned into chart, and returned to patient.            Thiago Green, MSW, [facility]  [phone]  "

## 2020-03-05 NOTE — LETTER
March 9, 2020      Jarod Kevin MD  [address] [place] [address]           [facility]  [address]  [facility] [place] [place] [address]  Phone: [phone]  Fax: [phone]          Patient: Ottoniel Arellano Jr.   MR Number: [id number]   YOB: 1940   Date of Visit: 3/5/2020       Dear Dr. Jarod Kevin:    Thank you for referring Ottoniel Arellano to me for evaluation. Attached you will find relevant portions of my assessment and plan of care.    If you have questions, please do not hesitate to call me. I look forward to following Ottoniel Arellano along with you.    Sincerely,    Nathalia Cooper MD    Enclosure  CC:  No Recipients    If you would like to receive this communication electronically, please contact [email or web address] or [phone] to request more information on [employer] Link access.    For providers and/or their staff who would like to refer a patient to [facility], please contact us through our one-stop-shop provider referral line, [facility], at [phone].    If you feel you have received this communication in error or would no longer like to receive these types of communications, please e-mail [email or web address]

## 2020-03-05 NOTE — PROGRESS NOTES
Subjective:       Patient ID: Ottoniel Arellano Jr. is a 79 y.o. male.    Chief Complaint: Consult    78 yo male here for palliative care consultation. He has HCC and has worsening abdominal pain which has been interfering with sleep and daily activities. He is accompanied by his wife and son who contribute to the history. His wife manages his medication. She reports that his appetite and intake have been very poor. He states that nothing appeals to him; they're planning to try a milkshake today.     MS Contin 30 mg q 12 for the past 2 weeks  Oxycodone 5 mg 2-3 extra per day      POA Completed and scan; Yani Green LCSW  reviewed Kathy   does not have any pertinent problems on file.  Past Medical History:   Diagnosis Date    Arthritis     Coronary artery disease     HCC (hepatocellular carcinoma)     Liver mass      Past Surgical History:   Procedure Laterality Date    ANGIOGRAM, CORONARY, WITH LEFT HEART CATHETERIZATION      stents 12/29/2012    EYE SURGERY      right, skin graft    FLUOROSCOPY N/A 1/21/2020    Procedure: Y90 Mapping;  Surgeon: Ar Ham MD;  Location: [facility] CATH LAB;  Service: General;  Laterality: N/A;    FLUOROSCOPY N/A 2/4/2020    Procedure: TheraSphere Liver Mass;  Surgeon: Ar Ham MD;  Location: [facility] CATH LAB;  Service: General;  Laterality: N/A;    SPINAL FUSION      ACDF 05/04/2010    TONSILLECTOMY       History reviewed. No pertinent family history.  Social History     Socioeconomic History    Marital status:      Spouse name: Not on file    Number of children: Not on file    Years of education: Not on file    Highest education level: Not on file   Occupational History    Not on file   Social Needs    Financial resource strain: Not on file    Food insecurity:     Worry: Not on file     Inability: Not on file    Transportation needs:     Medical: Not on file     Non-medical: Not on file   Tobacco Use    Smoking status: Former Smoker    Smokeless tobacco:  Former User     Quit date: 5/18/1986   Substance and Sexual Activity    Alcohol use: No    Drug use: Not on file    Sexual activity: Not on file   Lifestyle    Physical activity:     Days per week: Not on file     Minutes per session: Not on file    Stress: Not on file   Relationships    Social connections:     Talks on phone: Not on file     Gets together: Not on file     Attends Sabianist service: Not on file     Active member of club or organization: Not on file     Attends meetings of clubs or organizations: Not on file     Relationship status: Not on file   Other Topics Concern    Not on file   Social History Narrative    Not on file     Review of Systems   A comprehensive 14-point review of systems was reviewed with patient and was negative other than as specified above.     Objective:     Vitals:    03/05/20 0950   BP: (!) 156/71   Pulse: (!) 58   Temp: 97.5 °F (36.4 °C)        Physical Exam   Constitutional: He is oriented to person, place, and time. He appears well-developed. He appears distressed.   Frail, ill appearing   HENT:   Head: Normocephalic and atraumatic.   Eyes: Pupils are equal, round, and reactive to light. EOM are normal.   Neck: Normal range of motion. Neck supple.   Cardiovascular: Normal rate and regular rhythm.   Pulmonary/Chest: Effort normal and breath sounds normal.   Abdominal: Soft. There is tenderness.   Musculoskeletal: Normal range of motion. He exhibits no deformity.   Neurological: He is alert and oriented to person, place, and time.   Cognition in tact; ambulates independently   Skin: Skin is warm and dry.   Psychiatric: He has a normal mood and affect. His behavior is normal. Judgment and thought content normal.   Nursing note and vitals reviewed.      Assessment:       1. Insomnia, unspecified type    2. HCC (hepatocellular carcinoma)    3. Neoplasm of abdomen    4. Portal vein thrombosis    5. Liver mass    6. Cancer associated pain    7. Nausea    8. Poor appetite     9. ACP (advance care planning)        Plan:           Problem List Items Addressed This Visit        Palliative Care    ACP (advance care planning)    Overview     ACP documents living will and HCPOA completed at today's visit. Desires full code at this time but would not want prolonged artificial life support.            Other Visit Diagnoses     Insomnia, unspecified type    -  Primary    Relevant Medications    mirtazapine (REMERON) 7.5 MG Tab    HCC (hepatocellular carcinoma)        Relevant Medications    ondansetron (ZOFRAN) 4 MG tablet    Neoplasm of abdomen        Portal vein thrombosis        Liver mass        Cancer associated pain        Relevant Medications    methadone (DOLOPHINE) 5 MG tablet    Nausea        Relevant Medications    ondansetron (ZOFRAN) 4 MG tablet    Poor appetite        Relevant Medications    mirtazapine (REMERON) 7.5 MG Tab      Patient instructions:  We are stopping MS Contin 30 mg and starting methadone 5 mg twice daily (once every 12 hours). This will be your new long-acting pain medication. We may need to increase the dosing to 3 times daily if it not keeping the pain controlled throughout the day. Let me know how it is working.    You can take oxycodone 5 mg 1 to 2 tablets as needed for pain.     Promethazine is a nausea medication you were prescribed in October; this may be a helpful one to try. Consider giving it on a scheduled basis, such as once before breakfast and once before dinner. Let me know if you are out of it and I'll send in a new prescription..     For dry mouth try sucking on a hard candy (lemon, peppermint, ginger), chewing gum, or using over the counter artificial saliva.

## 2020-03-05 NOTE — PATIENT INSTRUCTIONS
We are stopping MS Contin 30 mg and starting methadone 5 mg twice daily (once every 12 hours). This will be your new long-acting pain medication. We may need to increase the dosing to 3 times daily if it not keeping the pain controlled throughout the day. Let me know how it is working.    You can take oxycodone 5 mg 1 to 2 tablets as needed for pain.     Promethazine is a nausea medication you were prescribed in October; this may be a helpful one to try. Consider giving it on a scheduled basis, such as once before breakfast and once before dinner. Let me know if you are out of it and I'll send in a new prescription..     For dry mouth try sucking on a hard candy (lemon, peppermint, ginger), chewing gum, or using over the counter artificial saliva.

## 2020-03-06 ENCOUNTER — HOSPITAL ENCOUNTER (INPATIENT)
Facility: HOSPITAL | Age: 80
LOS: 1 days | Discharge: HOME HEALTH | DRG: 435 | End: 2020-03-10
Attending: EMERGENCY MEDICINE | Admitting: INTERNAL MEDICINE
Payer: MEDICARE

## 2020-03-06 ENCOUNTER — NURSE TRIAGE (OUTPATIENT)
Dept: ADMINISTRATIVE | Facility: CLINIC | Age: 80
End: 2020-03-06

## 2020-03-06 DIAGNOSIS — I81 PORTAL VEIN THROMBOSIS SECONDARY TO HCC INVASION: ICD-10-CM

## 2020-03-06 DIAGNOSIS — R10.9 ABDOMINAL PAIN: ICD-10-CM

## 2020-03-06 DIAGNOSIS — C22.0 HEPATOCELLULAR CARCINOMA: ICD-10-CM

## 2020-03-06 DIAGNOSIS — R11.2 INTRACTABLE NAUSEA AND VOMITING: Primary | ICD-10-CM

## 2020-03-06 DIAGNOSIS — C22.0 PORTAL VEIN THROMBOSIS SECONDARY TO HCC INVASION: ICD-10-CM

## 2020-03-06 DIAGNOSIS — R10.10 PAIN OF UPPER ABDOMEN: ICD-10-CM

## 2020-03-06 PROCEDURE — 96365 THER/PROPH/DIAG IV INF INIT: CPT

## 2020-03-06 PROCEDURE — 99285 EMERGENCY DEPT VISIT HI MDM: CPT

## 2020-03-06 PROCEDURE — 96375 TX/PRO/DX INJ NEW DRUG ADDON: CPT

## 2020-03-06 PROCEDURE — 96361 HYDRATE IV INFUSION ADD-ON: CPT

## 2020-03-06 PROCEDURE — 96376 TX/PRO/DX INJ SAME DRUG ADON: CPT

## 2020-03-06 RX ORDER — MORPHINE SULFATE 4 MG/ML
8 INJECTION, SOLUTION INTRAMUSCULAR; INTRAVENOUS
Status: DISCONTINUED | OUTPATIENT
Start: 2020-03-07 | End: 2020-03-07

## 2020-03-07 PROBLEM — R11.2 INTRACTABLE NAUSEA AND VOMITING: Status: ACTIVE | Noted: 2020-03-07

## 2020-03-07 LAB
ALBUMIN SERPL BCP-MCNC: 3.4 G/DL (ref 3.5–5.2)
ALP SERPL-CCNC: 191 U/L (ref 55–135)
ALT SERPL W/O P-5'-P-CCNC: 23 U/L (ref 10–44)
ANION GAP SERPL CALC-SCNC: 11 MMOL/L (ref 8–16)
APTT BLDCRRT: 28.2 SEC (ref 21–32)
AST SERPL-CCNC: 59 U/L (ref 10–40)
BASOPHILS # BLD AUTO: 0.02 K/UL (ref 0–0.2)
BASOPHILS NFR BLD: 0.4 % (ref 0–1.9)
BILIRUB SERPL-MCNC: 0.9 MG/DL (ref 0.1–1)
BILIRUB UR QL STRIP: NEGATIVE
BUN SERPL-MCNC: 8 MG/DL (ref 8–23)
CALCIUM SERPL-MCNC: 9.8 MG/DL (ref 8.7–10.5)
CHLORIDE SERPL-SCNC: 99 MMOL/L (ref 95–110)
CLARITY UR: CLEAR
CO2 SERPL-SCNC: 25 MMOL/L (ref 23–29)
COLOR UR: YELLOW
CREAT SERPL-MCNC: 1 MG/DL (ref 0.5–1.4)
DIFFERENTIAL METHOD: ABNORMAL
EOSINOPHIL # BLD AUTO: 0 K/UL (ref 0–0.5)
EOSINOPHIL NFR BLD: 0.4 % (ref 0–8)
ERYTHROCYTE [DISTWIDTH] IN BLOOD BY AUTOMATED COUNT: 16.5 % (ref 11.5–14.5)
EST. GFR  (AFRICAN AMERICAN): >60 ML/MIN/1.73 M^2
EST. GFR  (NON AFRICAN AMERICAN): >60 ML/MIN/1.73 M^2
GLUCOSE SERPL-MCNC: 116 MG/DL (ref 70–110)
GLUCOSE UR QL STRIP: NEGATIVE
HCT VFR BLD AUTO: 38.9 % (ref 40–54)
HGB BLD-MCNC: 12.7 G/DL (ref 14–18)
HGB UR QL STRIP: NEGATIVE
IMM GRANULOCYTES # BLD AUTO: 0.02 K/UL (ref 0–0.04)
IMM GRANULOCYTES NFR BLD AUTO: 0.4 % (ref 0–0.5)
INR PPP: 1.1 (ref 0.8–1.2)
KETONES UR QL STRIP: NEGATIVE
LEUKOCYTE ESTERASE UR QL STRIP: NEGATIVE
LIPASE SERPL-CCNC: 18 U/L (ref 4–60)
LYMPHOCYTES # BLD AUTO: 0.6 K/UL (ref 1–4.8)
LYMPHOCYTES NFR BLD: 11.8 % (ref 18–48)
MCH RBC QN AUTO: 28 PG (ref 27–31)
MCHC RBC AUTO-ENTMCNC: 32.6 G/DL (ref 32–36)
MCV RBC AUTO: 86 FL (ref 82–98)
MONOCYTES # BLD AUTO: 0.5 K/UL (ref 0.3–1)
MONOCYTES NFR BLD: 9.4 % (ref 4–15)
NEUTROPHILS # BLD AUTO: 3.9 K/UL (ref 1.8–7.7)
NEUTROPHILS NFR BLD: 77.6 % (ref 38–73)
NITRITE UR QL STRIP: NEGATIVE
NRBC BLD-RTO: 0 /100 WBC
PH UR STRIP: 7 [PH] (ref 5–8)
PLATELET # BLD AUTO: 215 K/UL (ref 150–350)
PMV BLD AUTO: 10.2 FL (ref 9.2–12.9)
POTASSIUM SERPL-SCNC: 4 MMOL/L (ref 3.5–5.1)
PROT SERPL-MCNC: 7.6 G/DL (ref 6–8.4)
PROT UR QL STRIP: NEGATIVE
PROTHROMBIN TIME: 12 SEC (ref 9–12.5)
RBC # BLD AUTO: 4.53 M/UL (ref 4.6–6.2)
SODIUM SERPL-SCNC: 135 MMOL/L (ref 136–145)
SP GR UR STRIP: <=1.005 (ref 1–1.03)
URN SPEC COLLECT METH UR: ABNORMAL
UROBILINOGEN UR STRIP-ACNC: NEGATIVE EU/DL
WBC # BLD AUTO: 5 K/UL (ref 3.9–12.7)

## 2020-03-07 PROCEDURE — G0378 HOSPITAL OBSERVATION PER HR: HCPCS

## 2020-03-07 PROCEDURE — 96365 THER/PROPH/DIAG IV INF INIT: CPT | Mod: 59

## 2020-03-07 PROCEDURE — 96361 HYDRATE IV INFUSION ADD-ON: CPT | Performed by: EMERGENCY MEDICINE

## 2020-03-07 PROCEDURE — 85610 PROTHROMBIN TIME: CPT

## 2020-03-07 PROCEDURE — 96375 TX/PRO/DX INJ NEW DRUG ADDON: CPT

## 2020-03-07 PROCEDURE — 63600175 PHARM REV CODE 636 W HCPCS: Performed by: NURSE PRACTITIONER

## 2020-03-07 PROCEDURE — 96376 TX/PRO/DX INJ SAME DRUG ADON: CPT

## 2020-03-07 PROCEDURE — 85730 THROMBOPLASTIN TIME PARTIAL: CPT

## 2020-03-07 PROCEDURE — 96376 TX/PRO/DX INJ SAME DRUG ADON: CPT | Performed by: EMERGENCY MEDICINE

## 2020-03-07 PROCEDURE — 25000003 PHARM REV CODE 250: Performed by: NURSE PRACTITIONER

## 2020-03-07 PROCEDURE — 83690 ASSAY OF LIPASE: CPT

## 2020-03-07 PROCEDURE — 96375 TX/PRO/DX INJ NEW DRUG ADDON: CPT | Performed by: EMERGENCY MEDICINE

## 2020-03-07 PROCEDURE — 80053 COMPREHEN METABOLIC PANEL: CPT

## 2020-03-07 PROCEDURE — 85025 COMPLETE CBC W/AUTO DIFF WBC: CPT

## 2020-03-07 PROCEDURE — C9113 INJ PANTOPRAZOLE SODIUM, VIA: HCPCS | Performed by: NURSE PRACTITIONER

## 2020-03-07 PROCEDURE — 96361 HYDRATE IV INFUSION ADD-ON: CPT

## 2020-03-07 PROCEDURE — 36415 COLL VENOUS BLD VENIPUNCTURE: CPT

## 2020-03-07 PROCEDURE — 63600175 PHARM REV CODE 636 W HCPCS: Performed by: EMERGENCY MEDICINE

## 2020-03-07 PROCEDURE — 96366 THER/PROPH/DIAG IV INF ADDON: CPT

## 2020-03-07 PROCEDURE — 99214 OFFICE O/P EST MOD 30 MIN: CPT | Mod: ,,, | Performed by: INTERNAL MEDICINE

## 2020-03-07 PROCEDURE — 96372 THER/PROPH/DIAG INJ SC/IM: CPT | Mod: 59 | Performed by: EMERGENCY MEDICINE

## 2020-03-07 PROCEDURE — 25500020 PHARM REV CODE 255: Performed by: EMERGENCY MEDICINE

## 2020-03-07 PROCEDURE — 99214 PR OFFICE/OUTPT VISIT, EST, LEVL IV, 30-39 MIN: ICD-10-PCS | Mod: ,,, | Performed by: INTERNAL MEDICINE

## 2020-03-07 PROCEDURE — 81003 URINALYSIS AUTO W/O SCOPE: CPT

## 2020-03-07 RX ORDER — MORPHINE SULFATE 4 MG/ML
2 INJECTION, SOLUTION INTRAMUSCULAR; INTRAVENOUS EVERY 4 HOURS PRN
Status: DISCONTINUED | OUTPATIENT
Start: 2020-03-07 | End: 2020-03-07

## 2020-03-07 RX ORDER — MORPHINE SULFATE 4 MG/ML
4 INJECTION, SOLUTION INTRAMUSCULAR; INTRAVENOUS EVERY 4 HOURS PRN
Status: DISCONTINUED | OUTPATIENT
Start: 2020-03-07 | End: 2020-03-10 | Stop reason: HOSPADM

## 2020-03-07 RX ORDER — ONDANSETRON 8 MG/1
8 TABLET, ORALLY DISINTEGRATING ORAL
Status: DISCONTINUED | OUTPATIENT
Start: 2020-03-07 | End: 2020-03-07

## 2020-03-07 RX ORDER — HYDROMORPHONE HYDROCHLORIDE 2 MG/ML
1 INJECTION, SOLUTION INTRAMUSCULAR; INTRAVENOUS; SUBCUTANEOUS
Status: COMPLETED | OUTPATIENT
Start: 2020-03-07 | End: 2020-03-07

## 2020-03-07 RX ORDER — METOPROLOL TARTRATE 1 MG/ML
5 INJECTION, SOLUTION INTRAVENOUS EVERY 8 HOURS
Status: DISCONTINUED | OUTPATIENT
Start: 2020-03-07 | End: 2020-03-09

## 2020-03-07 RX ORDER — SODIUM CHLORIDE 9 MG/ML
INJECTION, SOLUTION INTRAVENOUS CONTINUOUS
Status: DISCONTINUED | OUTPATIENT
Start: 2020-03-07 | End: 2020-03-09

## 2020-03-07 RX ORDER — HYDRALAZINE HYDROCHLORIDE 20 MG/ML
10 INJECTION INTRAMUSCULAR; INTRAVENOUS EVERY 8 HOURS PRN
Status: DISCONTINUED | OUTPATIENT
Start: 2020-03-07 | End: 2020-03-10 | Stop reason: HOSPADM

## 2020-03-07 RX ORDER — MORPHINE SULFATE 2 MG/ML
2 INJECTION, SOLUTION INTRAMUSCULAR; INTRAVENOUS EVERY 4 HOURS PRN
Status: DISCONTINUED | OUTPATIENT
Start: 2020-03-07 | End: 2020-03-10 | Stop reason: HOSPADM

## 2020-03-07 RX ORDER — ONDANSETRON 2 MG/ML
8 INJECTION INTRAMUSCULAR; INTRAVENOUS EVERY 8 HOURS PRN
Status: DISCONTINUED | OUTPATIENT
Start: 2020-03-07 | End: 2020-03-10 | Stop reason: HOSPADM

## 2020-03-07 RX ORDER — PANTOPRAZOLE SODIUM 40 MG/10ML
40 INJECTION, POWDER, LYOPHILIZED, FOR SOLUTION INTRAVENOUS DAILY
Status: DISCONTINUED | OUTPATIENT
Start: 2020-03-07 | End: 2020-03-10 | Stop reason: HOSPADM

## 2020-03-07 RX ORDER — ENOXAPARIN SODIUM 100 MG/ML
1 INJECTION SUBCUTANEOUS
Status: DISCONTINUED | OUTPATIENT
Start: 2020-03-07 | End: 2020-03-10 | Stop reason: HOSPADM

## 2020-03-07 RX ADMIN — PROMETHAZINE HYDROCHLORIDE 12.5 MG: 25 INJECTION INTRAMUSCULAR; INTRAVENOUS at 09:03

## 2020-03-07 RX ADMIN — ENOXAPARIN SODIUM 80 MG: 100 INJECTION, SOLUTION INTRAVENOUS; SUBCUTANEOUS at 10:03

## 2020-03-07 RX ADMIN — IOHEXOL 75 ML: 350 INJECTION, SOLUTION INTRAVENOUS at 03:03

## 2020-03-07 RX ADMIN — METOPROLOL TARTRATE 5 MG: 5 INJECTION INTRAVENOUS at 10:03

## 2020-03-07 RX ADMIN — PROMETHAZINE HYDROCHLORIDE 12.5 MG: 25 INJECTION INTRAMUSCULAR; INTRAVENOUS at 07:03

## 2020-03-07 RX ADMIN — HYDRALAZINE HYDROCHLORIDE 10 MG: 20 INJECTION INTRAMUSCULAR; INTRAVENOUS at 11:03

## 2020-03-07 RX ADMIN — HYDROMORPHONE HYDROCHLORIDE 1 MG: 2 INJECTION INTRAMUSCULAR; INTRAVENOUS; SUBCUTANEOUS at 02:03

## 2020-03-07 RX ADMIN — SODIUM CHLORIDE 1000 ML: 0.9 INJECTION, SOLUTION INTRAVENOUS at 12:03

## 2020-03-07 RX ADMIN — HYDROMORPHONE HYDROCHLORIDE 1 MG: 2 INJECTION INTRAMUSCULAR; INTRAVENOUS; SUBCUTANEOUS at 01:03

## 2020-03-07 RX ADMIN — HYDRALAZINE HYDROCHLORIDE 10 MG: 20 INJECTION INTRAMUSCULAR; INTRAVENOUS at 03:03

## 2020-03-07 RX ADMIN — IOHEXOL 30 ML: 350 INJECTION, SOLUTION INTRAVENOUS at 02:03

## 2020-03-07 RX ADMIN — MORPHINE SULFATE 2 MG: 4 INJECTION, SOLUTION INTRAMUSCULAR; INTRAVENOUS at 10:03

## 2020-03-07 RX ADMIN — PANTOPRAZOLE SODIUM 40 MG: 40 INJECTION, POWDER, LYOPHILIZED, FOR SOLUTION INTRAVENOUS at 10:03

## 2020-03-07 RX ADMIN — ONDANSETRON HYDROCHLORIDE 8 MG: 2 SOLUTION INTRAMUSCULAR; INTRAVENOUS at 04:03

## 2020-03-07 RX ADMIN — METOPROLOL TARTRATE 5 MG: 5 INJECTION INTRAVENOUS at 01:03

## 2020-03-07 RX ADMIN — MORPHINE SULFATE 4 MG: 4 INJECTION, SOLUTION INTRAMUSCULAR; INTRAVENOUS at 09:03

## 2020-03-07 RX ADMIN — PROMETHAZINE HYDROCHLORIDE 12.5 MG: 25 INJECTION INTRAMUSCULAR; INTRAVENOUS at 12:03

## 2020-03-07 RX ADMIN — MORPHINE SULFATE 4 MG: 4 INJECTION, SOLUTION INTRAMUSCULAR; INTRAVENOUS at 04:03

## 2020-03-07 RX ADMIN — SODIUM CHLORIDE: 0.9 INJECTION, SOLUTION INTRAVENOUS at 10:03

## 2020-03-07 NOTE — HPI
79-year-old male history of metastatic hepatocellular carcinoma having failed local therapy.  At this point the patient is admitted to hospital for abdominal pain in control.  Had tried previously MS Contin has not started on methadone.  At this point difficulty keeping solid foods down.  Was called by the emergency room after seeing the patient would recommend admission to the hospital for pain control patient is aware of do not resuscitate orders and has had advanced care planning

## 2020-03-07 NOTE — TELEPHONE ENCOUNTER
Reason for Disposition   [1] Follow-up call to recent contact AND [2] information only call, no triage required    Additional Information   Negative: [1] Caller is not with the adult (patient) AND [2] reporting urgent symptoms   Negative: Lab result questions   Negative: Medication questions   Negative: Caller can't be reached by phone   Negative: Caller has already spoken to PCP or another triager   Negative: RN needs further essential information from caller in order to complete triage   Negative: Requesting regular office appointment   Negative: [1] Caller requesting NON-URGENT health information AND [2] PCP's office is the best resource   Negative: [1] Caller is not with the adult (patient) AND [2] probable NON-URGENT symptoms   Negative: Question about upcoming scheduled test, no triage required and triager able to answer question   Negative: General information question, no triage required and triager able to answer question   Negative: Health Information question, no triage required and triager able to answer question    Protocols used: INFORMATION ONLY CALL-A-  pt states he is on his way to [facility]. Pt requests to let ER know he is in route. ETA 30-60 mins. [facility] ER notified.

## 2020-03-07 NOTE — ASSESSMENT & PLAN NOTE
-Followed by Dr. Kevin outpatient   -Patient received 1st dose of Nivolamab therapy on 3/3/20  -Oncology consulted   -Supportive care

## 2020-03-07 NOTE — TELEPHONE ENCOUNTER
"  Reason for Disposition   [1] Neutropenia known or suspected (e.g., recent cancer chemotherapy) AND [2] vomiting    Additional Information   Negative: Shock suspected (e.g., cold/pale/clammy skin, too weak to stand, low BP, rapid pulse)   Negative: Difficult to awaken or acting confused (e.g., disoriented, slurred speech)   Negative: Sounds like a life-threatening emergency to the triager   Negative: Chest pain   Negative: Vomiting occurs only while coughing   Negative: Constipation is main symptom   Negative: Diarrhea is main symptom   Negative: [1] Vomiting AND [2] contains red blood or black ("coffee ground") material  (Exception: few red streaks in vomit that only happened once)   Negative: [1] Vomiting AND [2] recent head injury (within last 3 days)   Negative: [1] Vomiting AND [2] recent abdominal injury (within last 3 days)   Negative: [1] Vomiting AND [2] insulin-dependent diabetes (Type I) AND [3] glucose > 400 mg/dl (22 mmol/l)   Negative: [1] Neutropenia known or suspected (e.g., recent cancer chemotherapy) AND [2] fever > 100.4 F (38.0 C)    Protocols used: CANCER - NAUSEA AND VOMITING-A-AH  Cant eat or sleep, vomiting up everything. c/o abd pain near blockage/cancer. Passing some flatus. Last BM this am. rec ED. Pt states he will go to [place] and get transported if necessary. Call back with questions   "

## 2020-03-07 NOTE — ASSESSMENT & PLAN NOTE
-CT abdomen imaging showed 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  No bowel obstruction or evidence for perforation or abscess. No change from previous imaging.   -NPO   -IVFs   -Analgesics and antiemetics prn   - Continue symptomatic care.   -Monitor LFTs.

## 2020-03-07 NOTE — ASSESSMENT & PLAN NOTE
Pt NPO   Denies chest pain or shortness of breath.  IV metoprolol q8hr    Will resume aspirin and statin when appropriate

## 2020-03-07 NOTE — SUBJECTIVE & OBJECTIVE
Past Medical History:   Diagnosis Date    Arthritis     Coronary artery disease     HCC (hepatocellular carcinoma)     Liver mass        Past Surgical History:   Procedure Laterality Date    ANGIOGRAM, CORONARY, WITH LEFT HEART CATHETERIZATION      stents 12/29/2012    EYE SURGERY      right, skin graft    FLUOROSCOPY N/A 1/21/2020    Procedure: Y90 Mapping;  Surgeon: Ar Ham MD;  Location: [facility] CATH LAB;  Service: General;  Laterality: N/A;    FLUOROSCOPY N/A 2/4/2020    Procedure: TheraSphere Liver Mass;  Surgeon: Ar Ham MD;  Location: [facility] CATH LAB;  Service: General;  Laterality: N/A;    SPINAL FUSION      ACDF 05/04/2010    TONSILLECTOMY         Review of patient's allergies indicates:   Allergen Reactions    Penicillins Hives and Rash    Shellfish containing products      Pt states face and hands turn blotchy red and itch       No current facility-administered medications on file prior to encounter.      Current Outpatient Medications on File Prior to Encounter   Medication Sig    ascorbic acid (VITAMIN C) 500 MG tablet Take 500 mg by mouth once daily.    aspirin 81 MG Chew Take 81 mg by mouth once daily.    atorvastatin (LIPITOR) 40 MG tablet Take 40 mg by mouth once daily.     cinnamon bark 500 mg capsule Take 1,000 mg by mouth once daily.    diphenhydrAMINE (BENADRYL) 25 mg capsule Take 50 mg by mouth nightly as needed for Itching.    fish oil-omega-3 fatty acids 300-1,000 mg capsule Take 2 g by mouth once daily.    fluticasone propionate (FLONASE ALLERGY RELIEF) 50 mcg/actuation nasal spray 1 spray by Each Nare route once daily.    meloxicam (MOBIC) 15 MG tablet Take 15 mg by mouth daily as needed for Pain.    methadone (DOLOPHINE) 5 MG tablet Take 1 tablet (5 mg total) by mouth every 6 (six) hours as needed for Pain.    metoprolol succinate (TOPROL-XL) 25 MG 24 hr tablet Take 25 mg by mouth once daily.    metoprolol tartrate (LOPRESSOR) 25 MG tablet      mirtazapine (REMERON) 7.5 MG Tab Take 1-2 tablets (7.5-15 mg total) by mouth nightly. For sleep and appetite    NEXIUM 40 mg capsule Take 40 mg by mouth daily as needed.     ondansetron (ZOFRAN) 4 MG tablet Take 1 tablet (4 mg total) by mouth every 8 (eight) hours as needed for Nausea.    oxyCODONE (ROXICODONE) 5 MG immediate release tablet Take 1 tablet (5 mg total) by mouth every 4 (four) hours as needed for Pain. Medically necessary for more than 7 days    promethazine (PHENERGAN) 12.5 MG Tab Take 1 tablet (12.5 mg total) by mouth every 6 to 8 hours as needed.    tamsulosin (FLOMAX) 0.4 mg Cp24 Take 0.4 mg by mouth every evening.     vitamin D (VITAMIN D3) 1000 units Tab Take 1,000 Units by mouth.    vitamin E 400 unit Tab Take 1 tablet by mouth once daily.    warfarin (COUMADIN) 5 MG tablet Take 2 tablets by mouth on Tuesdays and Thursdays; and 1 tablet on all other days of the week.     Family History     None        Tobacco Use    Smoking status: Former Smoker    Smokeless tobacco: Former User     Quit date: 5/18/1986   Substance and Sexual Activity    Alcohol use: No    Drug use: Not on file    Sexual activity: Not on file     Review of Systems   Constitutional: Positive for activity change, appetite change and fatigue.   HENT: Negative.    Eyes: Negative.    Respiratory: Negative.  Negative for cough, shortness of breath and wheezing.    Cardiovascular: Negative.  Negative for chest pain, palpitations and leg swelling.   Gastrointestinal: Positive for abdominal pain, nausea and vomiting (dry heaving ). Negative for constipation and diarrhea.   Endocrine: Negative.    Genitourinary: Negative.  Negative for dysuria, frequency and urgency.   Musculoskeletal: Negative.    Skin: Negative.    Allergic/Immunologic: Negative.    Neurological: Positive for weakness. Negative for dizziness, speech difficulty, numbness and headaches.   Hematological: Negative.    Psychiatric/Behavioral: Negative.       Objective:     Vital Signs (Most Recent):  Temp: 98.1 °F (36.7 °C) (03/06/20 2358)  Pulse: 66 (03/07/20 0535)  Resp: 17 (03/07/20 0535)  BP: (!) 157/78 (03/07/20 0535)  SpO2: 95 % (03/07/20 0535) Vital Signs (24h Range):  Temp:  [98.1 °F (36.7 °C)] 98.1 °F (36.7 °C)  Pulse:  [] 66  Resp:  [16-18] 17  SpO2:  [95 %-98 %] 95 %  BP: (157-198)/(78-88) 157/78     Weight: 79 kg (174 lb 2.6 oz)  Body mass index is 24.99 kg/m².    Physical Exam   Constitutional: He is oriented to person, place, and time. He appears well-developed and well-nourished.   HENT:   Head: Normocephalic and atraumatic.   Eyes: Pupils are equal, round, and reactive to light. EOM are normal.   Neck: Normal range of motion. Neck supple.   Cardiovascular: Normal rate, regular rhythm, normal heart sounds, intact distal pulses and normal pulses.   Pulmonary/Chest: Effort normal and breath sounds normal. No accessory muscle usage. No tachypnea. No respiratory distress.   Abdominal: Soft. Normal appearance and bowel sounds are normal. There is tenderness (diffuse worse in epigastric area ) in the epigastric area.   Musculoskeletal: Normal range of motion.   Neurological: He is alert and oriented to person, place, and time. He has normal reflexes.   Skin: Skin is warm, dry and intact. Capillary refill takes less than 2 seconds.   Psychiatric: He has a normal mood and affect. His speech is normal and behavior is normal. Judgment and thought content normal. Cognition and memory are normal.   Nursing note and vitals reviewed.       Significant Labs:   BMP:   Recent Labs   Lab 03/07/20 0031   *   *   K 4.0   CL 99   CO2 25   BUN 8   CREATININE 1.0   CALCIUM 9.8     CBC:   Recent Labs   Lab 03/07/20 0031   WBC 5.00   HGB 12.7*   HCT 38.9*        CMP:   Recent Labs   Lab 03/07/20 0031   *   K 4.0   CL 99   CO2 25   *   BUN 8   CREATININE 1.0   CALCIUM 9.8   PROT 7.6   ALBUMIN 3.4*   BILITOT 0.9   ALKPHOS 191*   AST 59*    ALT 23   ANIONGAP 11   EGFRNONAA >60     All pertinent labs within the past 24 hours have been reviewed.    Significant Imaging:   Imaging Results          CT Abdomen Pelvis With Contrast (In process)

## 2020-03-07 NOTE — H&P
[facility] Hospital Medicine  History & Physical    Patient Name: Ottoniel Arellano Jr.  MRN: [id number]  Admission Date: 3/6/2020  Attending Physician: Ko Maldonado MD   Primary Care Provider: David Stevens MD         Patient information was obtained from patient, spouse/SO and ER records.     Subjective:     Principal Problem:Abdominal pain    Chief Complaint:   Chief Complaint   Patient presents with    Abdominal Pain     abd pain worsening over the past few days; hx liver cancer        HPI: Ottoniel Arellano is an 79 year old male with CAD, arthritis, and HCC who presented for evaluation of worsening abd pain which onset gradually several days PTA. He reports chronic diffuse abdominal pain, nausea, decreased appetite and weight loss, but denies other symptoms including fever and chills. Pt reports that he has not been able to keep down any food or liquid. Of note, it appears the patient has been unable to take his oral medications due his chronic symptoms. In the ED, CT abdomen showed 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  No bowel obstruction or evidence for perforation or abscess. Patient placed in observation for intractable n/v and pain control. Code status was discussed with the patient. He is a full code. His wife, Maddy Arellano, is his surrogate medical decision maker.           Past Medical History:   Diagnosis Date    Arthritis     Coronary artery disease     HCC (hepatocellular carcinoma)     Liver mass        Past Surgical History:   Procedure Laterality Date    ANGIOGRAM, CORONARY, WITH LEFT HEART CATHETERIZATION      stents 12/29/2012    EYE SURGERY      right, skin graft    FLUOROSCOPY N/A 1/21/2020    Procedure: Y90 Mapping;  Surgeon: Ar Ham MD;  Location: [facility] CATH LAB;  Service: General;  Laterality: N/A;    FLUOROSCOPY N/A 2/4/2020    Procedure: TheraSphere Liver Mass;  Surgeon: Ar  LAY Ham MD;  Location: [facility] CATH LAB;  Service: General;  Laterality: N/A;    SPINAL FUSION      ACDF 05/04/2010    TONSILLECTOMY         Review of patient's allergies indicates:   Allergen Reactions    Penicillins Hives and Rash    Shellfish containing products      Pt states face and hands turn blotchy red and itch       No current facility-administered medications on file prior to encounter.      Current Outpatient Medications on File Prior to Encounter   Medication Sig    ascorbic acid (VITAMIN C) 500 MG tablet Take 500 mg by mouth once daily.    aspirin 81 MG Chew Take 81 mg by mouth once daily.    atorvastatin (LIPITOR) 40 MG tablet Take 40 mg by mouth once daily.     cinnamon bark 500 mg capsule Take 1,000 mg by mouth once daily.    diphenhydrAMINE (BENADRYL) 25 mg capsule Take 50 mg by mouth nightly as needed for Itching.    fish oil-omega-3 fatty acids 300-1,000 mg capsule Take 2 g by mouth once daily.    fluticasone propionate (FLONASE ALLERGY RELIEF) 50 mcg/actuation nasal spray 1 spray by Each Nare route once daily.    meloxicam (MOBIC) 15 MG tablet Take 15 mg by mouth daily as needed for Pain.    methadone (DOLOPHINE) 5 MG tablet Take 1 tablet (5 mg total) by mouth every 6 (six) hours as needed for Pain.    metoprolol succinate (TOPROL-XL) 25 MG 24 hr tablet Take 25 mg by mouth once daily.    metoprolol tartrate (LOPRESSOR) 25 MG tablet     mirtazapine (REMERON) 7.5 MG Tab Take 1-2 tablets (7.5-15 mg total) by mouth nightly. For sleep and appetite    NEXIUM 40 mg capsule Take 40 mg by mouth daily as needed.     ondansetron (ZOFRAN) 4 MG tablet Take 1 tablet (4 mg total) by mouth every 8 (eight) hours as needed for Nausea.    oxyCODONE (ROXICODONE) 5 MG immediate release tablet Take 1 tablet (5 mg total) by mouth every 4 (four) hours as needed for Pain. Medically necessary for more than 7 days    promethazine (PHENERGAN) 12.5 MG Tab Take 1 tablet (12.5 mg total) by mouth every 6 to  8 hours as needed.    tamsulosin (FLOMAX) 0.4 mg Cp24 Take 0.4 mg by mouth every evening.     vitamin D (VITAMIN D3) 1000 units Tab Take 1,000 Units by mouth.    vitamin E 400 unit Tab Take 1 tablet by mouth once daily.    warfarin (COUMADIN) 5 MG tablet Take 2 tablets by mouth on Tuesdays and Thursdays; and 1 tablet on all other days of the week.     Family History     None        Tobacco Use    Smoking status: Former Smoker    Smokeless tobacco: Former User     Quit date: 5/18/1986   Substance and Sexual Activity    Alcohol use: No    Drug use: Not on file    Sexual activity: Not on file     Review of Systems   Constitutional: Positive for activity change, appetite change and fatigue.   HENT: Negative.    Eyes: Negative.    Respiratory: Negative.  Negative for cough, shortness of breath and wheezing.    Cardiovascular: Negative.  Negative for chest pain, palpitations and leg swelling.   Gastrointestinal: Positive for abdominal pain, nausea and vomiting (dry heaving ). Negative for constipation and diarrhea.   Endocrine: Negative.    Genitourinary: Negative.  Negative for dysuria, frequency and urgency.   Musculoskeletal: Negative.    Skin: Negative.    Allergic/Immunologic: Negative.    Neurological: Positive for weakness. Negative for dizziness, speech difficulty, numbness and headaches.   Hematological: Negative.    Psychiatric/Behavioral: Negative.      Objective:     Vital Signs (Most Recent):  Temp: 98.1 °F (36.7 °C) (03/06/20 2358)  Pulse: 66 (03/07/20 0535)  Resp: 17 (03/07/20 0535)  BP: (!) 157/78 (03/07/20 0535)  SpO2: 95 % (03/07/20 0535) Vital Signs (24h Range):  Temp:  [98.1 °F (36.7 °C)] 98.1 °F (36.7 °C)  Pulse:  [] 66  Resp:  [16-18] 17  SpO2:  [95 %-98 %] 95 %  BP: (157-198)/(78-88) 157/78     Weight: 79 kg (174 lb 2.6 oz)  Body mass index is 24.99 kg/m².    Physical Exam   Constitutional: He is oriented to person, place, and time. He appears well-developed and well-nourished.    HENT:   Head: Normocephalic and atraumatic.   Eyes: Pupils are equal, round, and reactive to light. EOM are normal.   Neck: Normal range of motion. Neck supple.   Cardiovascular: Normal rate, regular rhythm, normal heart sounds, intact distal pulses and normal pulses.   Pulmonary/Chest: Effort normal and breath sounds normal. No accessory muscle usage. No tachypnea. No respiratory distress.   Abdominal: Soft. Normal appearance and bowel sounds are normal. There is tenderness (diffuse worse in epigastric area ) in the epigastric area.   Musculoskeletal: Normal range of motion.   Neurological: He is alert and oriented to person, place, and time. He has normal reflexes.   Skin: Skin is warm, dry and intact. Capillary refill takes less than 2 seconds.   Psychiatric: He has a normal mood and affect. His speech is normal and behavior is normal. Judgment and thought content normal. Cognition and memory are normal.   Nursing note and vitals reviewed.       Significant Labs:   BMP:   Recent Labs   Lab 03/07/20 0031   *   *   K 4.0   CL 99   CO2 25   BUN 8   CREATININE 1.0   CALCIUM 9.8     CBC:   Recent Labs   Lab 03/07/20 0031   WBC 5.00   HGB 12.7*   HCT 38.9*        CMP:   Recent Labs   Lab 03/07/20 0031   *   K 4.0   CL 99   CO2 25   *   BUN 8   CREATININE 1.0   CALCIUM 9.8   PROT 7.6   ALBUMIN 3.4*   BILITOT 0.9   ALKPHOS 191*   AST 59*   ALT 23   ANIONGAP 11   EGFRNONAA >60     All pertinent labs within the past 24 hours have been reviewed.    Significant Imaging:   Imaging Results          CT Abdomen Pelvis With Contrast (In process)               Assessment/Plan:     * Abdominal pain  -CT abdomen imaging showed 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  No bowel obstruction or evidence for perforation or abscess. No change from previous imaging.   -NPO   -IVFs   -Analgesics and antiemetics prn   -  Continue symptomatic care.   -Monitor LFTs.       Intractable nausea and vomiting  IVFs   Antiemetics prn       Essential hypertension  IV metoprolol q8 hr   IV hydralazine prn   Monitor       Hepatocellular carcinoma  -Followed by Dr. Kevin outpatient   -Patient received 1st dose of Nivolamab therapy on 3/3/20  -Oncology consulted   -Supportive care       CAD (coronary artery disease)  Pt NPO   Denies chest pain or shortness of breath.  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

## 2020-03-07 NOTE — ED PROVIDER NOTES
79 year old male patient presents to the ER with a complaint of pain where his cancer is and throwing up everything he tries to eat.        Pt understands that a workup will begin in the treatment lounge/results waiting areas due to there being no available beds. Pt also undertstands they will be placed in the next available bed where they will be seen and dispositioned by a physician. I am removing myself from the care of pt. Pt will be assigned to next available physician.      Dashawn Baker [id number]      SCRIBE #1 NOTE: I, Cesia Osorio, am scribing for, and in the presence of, Farrah Posada DO. I have scribed the HPI, ROS, and PEx.          History     Chief Complaint   Patient presents with    Abdominal Pain     abd pain worsening over the past few days; hx liver cancer     Review of patient's allergies indicates:   Allergen Reactions    Penicillins Hives and Rash    Shellfish containing products      Pt states face and hands turn blotchy red and itch         History of Present Illness     HPI    3/7/2020, 1:27 AM  History obtained from the patient      History of Present Illness: Ottoniel Arellano Jr. is a 79 y.o. male patient with a PMHx of CAD who presents to the Emergency Department for evaluation of worsening abd pain which onset gradually several days PTA. Pt was diagnosed with liver cancer about 1 year PTA. Pt reports that he has not been able to keep down any food or liquid. Pt also notes that he has been losing a lot of weight. Symptoms are constant and moderate in severity. No mitigating or exacerbating factors reported. Associated sxs include fatigue and n/v. Patient denies any fever, chills, CP, SOB, HA and all other sxs at this time. No prior Tx reported. No further complaints or concerns at this time.       Arrival mode: Personal vehicle     PCP: David Stevens MD        Past Medical History:  Past Medical History:   Diagnosis Date    Arthritis     Coronary artery disease     HCC  (hepatocellular carcinoma)     Liver mass        Past Surgical History:  Past Surgical History:   Procedure Laterality Date    ANGIOGRAM, CORONARY, WITH LEFT HEART CATHETERIZATION      stents 12/29/2012    EYE SURGERY      right, skin graft    FLUOROSCOPY N/A 1/21/2020    Procedure: Y90 Mapping;  Surgeon: Ar Ham MD;  Location: [facility] CATH LAB;  Service: General;  Laterality: N/A;    FLUOROSCOPY N/A 2/4/2020    Procedure: TheraSphere Liver Mass;  Surgeon: Ar Ham MD;  Location: [facility] CATH LAB;  Service: General;  Laterality: N/A;    SPINAL FUSION      ACDF 05/04/2010    TONSILLECTOMY           Family History:  History reviewed. No pertinent family history.    Social History:  Social History     Tobacco Use    Smoking status: Former Smoker    Smokeless tobacco: Former User     Quit date: 5/18/1986   Substance and Sexual Activity    Alcohol use: No    Drug use: Not on file    Sexual activity: Not on file        Review of Systems     Review of Systems   Constitutional: Positive for fatigue. Negative for chills and fever.   HENT: Negative for sore throat.    Respiratory: Negative for shortness of breath.    Cardiovascular: Negative for chest pain.   Gastrointestinal: Positive for abdominal pain (worsening), nausea and vomiting.   Genitourinary: Negative for dysuria.   Musculoskeletal: Negative for back pain.   Skin: Negative for rash.   Neurological: Negative for weakness and headaches.   Hematological: Does not bruise/bleed easily.   All other systems reviewed and are negative.     Physical Exam     Initial Vitals [03/06/20 2358]   BP Pulse Resp Temp SpO2   (!) 191/86 103 18 98.1 °F (36.7 °C) 98 %      MAP       --          Physical Exam  Nursing Notes and Vital Signs Reviewed.  Constitutional: Patient is in no acute distress. Well-developed and well-nourished.  Head: Atraumatic. Normocephalic.  Eyes: PERRL. EOM intact. Conjunctivae are not pale. No scleral icterus.  ENT: Mucous membranes are  "dry. Oropharynx is clear and symmetric.    Neck: Supple. Full ROM. No lymphadenopathy.  Cardiovascular: Regular rate. Regular rhythm. No murmurs, rubs, or gallops. Distal pulses are 2+ and symmetric.  Pulmonary/Chest: No respiratory distress. Clear to auscultation bilaterally. No wheezing or rales.  Abdominal: Soft and non-distended.  There is epigastric tenderness to palpation.  No rebound, guarding, or rigidity. Hyperactive bowel sounds.  Genitourinary: No CVA tenderness  Musculoskeletal: Moves all extremities. No obvious deformities. No edema. No calf tenderness.  Skin: Warm and dry.  Neurological:  Alert, awake, and appropriate.  Normal speech.  No acute focal neurological deficits are appreciated.  Psychiatric: Normal affect. Good eye contact. Appropriate in content.     ED Course   Procedures  ED Vital Signs:  Vitals:    03/06/20 2358 03/07/20 0049 03/07/20 0140 03/07/20 0200   BP: (!) 191/86  (!) 198/88 (!) 187/88   Pulse: 103  68 73   Resp: 18  17 18   Temp: 98.1 °F (36.7 °C)      TempSrc: Oral      SpO2: 98%  95% 97%   Weight:  79 kg (174 lb 2.6 oz)     Height: 5' 10" (1.778 m)       03/07/20 0330 03/07/20 0535   BP: (!) 162/78 (!) 157/78   Pulse: 75 66   Resp: 16 17   Temp:     TempSrc:     SpO2: 95% 95%   Weight:     Height:         Abnormal Lab Results:  Labs Reviewed   CBC W/ AUTO DIFFERENTIAL - Abnormal; Notable for the following components:       Result Value    RBC 4.53 (*)     Hemoglobin 12.7 (*)     Hematocrit 38.9 (*)     RDW 16.5 (*)     Lymph # 0.6 (*)     Gran% 77.6 (*)     Lymph% 11.8 (*)     All other components within normal limits   COMPREHENSIVE METABOLIC PANEL - Abnormal; Notable for the following components:    Sodium 135 (*)     Glucose 116 (*)     Albumin 3.4 (*)     Alkaline Phosphatase 191 (*)     AST 59 (*)     All other components within normal limits   URINALYSIS, REFLEX TO URINE CULTURE - Abnormal; Notable for the following components:    Specific Gravity, UA <=1.005 (*)     All " other components within normal limits    Narrative:     Preferred Collection Type->Urine, Clean Catch   APTT   LIPASE   PROTIME-INR   LIPASE   APTT   PROTIME-INR        All Lab Results:  Results for orders placed or performed during the hospital encounter of 03/06/20   CBC auto differential   Result Value Ref Range    WBC 5.00 3.90 - 12.70 K/uL    RBC 4.53 (L) 4.60 - 6.20 M/uL    Hemoglobin 12.7 (L) 14.0 - 18.0 g/dL    Hematocrit 38.9 (L) 40.0 - 54.0 %    Mean Corpuscular Volume 86 82 - 98 fL    Mean Corpuscular Hemoglobin 28.0 27.0 - 31.0 pg    Mean Corpuscular Hemoglobin Conc 32.6 32.0 - 36.0 g/dL    RDW 16.5 (H) 11.5 - 14.5 %    Platelets 215 150 - 350 K/uL    MPV 10.2 9.2 - 12.9 fL    Immature Granulocytes 0.4 0.0 - 0.5 %    Gran # (ANC) 3.9 1.8 - 7.7 K/uL    Immature Grans (Abs) 0.02 0.00 - 0.04 K/uL    Lymph # 0.6 (L) 1.0 - 4.8 K/uL    Mono # 0.5 0.3 - 1.0 K/uL    Eos # 0.0 0.0 - 0.5 K/uL    Baso # 0.02 0.00 - 0.20 K/uL    nRBC 0 0 /100 WBC    Gran% 77.6 (H) 38.0 - 73.0 %    Lymph% 11.8 (L) 18.0 - 48.0 %    Mono% 9.4 4.0 - 15.0 %    Eosinophil% 0.4 0.0 - 8.0 %    Basophil% 0.4 0.0 - 1.9 %    Differential Method Automated    Comprehensive metabolic panel   Result Value Ref Range    Sodium 135 (L) 136 - 145 mmol/L    Potassium 4.0 3.5 - 5.1 mmol/L    Chloride 99 95 - 110 mmol/L    CO2 25 23 - 29 mmol/L    Glucose 116 (H) 70 - 110 mg/dL    BUN, Bld 8 8 - 23 mg/dL    Creatinine 1.0 0.5 - 1.4 mg/dL    Calcium 9.8 8.7 - 10.5 mg/dL    Total Protein 7.6 6.0 - 8.4 g/dL    Albumin 3.4 (L) 3.5 - 5.2 g/dL    Total Bilirubin 0.9 0.1 - 1.0 mg/dL    Alkaline Phosphatase 191 (H) 55 - 135 U/L    AST 59 (H) 10 - 40 U/L    ALT 23 10 - 44 U/L    Anion Gap 11 8 - 16 mmol/L    eGFR if African American >60 >60 mL/min/1.73 m^2    eGFR if non African American >60 >60 mL/min/1.73 m^2   Urinalysis, Reflex to Urine Culture Urine, Clean Catch   Result Value Ref Range    Specimen UA Urine, Clean Catch     Color, UA Yellow Yellow, Straw,  Angy    Appearance, UA Clear Clear    pH, UA 7.0 5.0 - 8.0    Specific Gravity, UA <=1.005 (A) 1.005 - 1.030    Protein, UA Negative Negative    Glucose, UA Negative Negative    Ketones, UA Negative Negative    Bilirubin (UA) Negative Negative    Occult Blood UA Negative Negative    Nitrite, UA Negative Negative    Urobilinogen, UA Negative <2.0 EU/dL    Leukocytes, UA Negative Negative   Lipase   Result Value Ref Range    Lipase 18 4 - 60 U/L   APTT   Result Value Ref Range    aPTT 28.2 21.0 - 32.0 sec   Protime-INR   Result Value Ref Range    Prothrombin Time 12.0 9.0 - 12.5 sec    INR 1.1 0.8 - 1.2         Imaging Results:  Imaging Results          CT Abdomen Pelvis With Contrast (In process)                                 The Emergency Provider reviewed the vital signs and test results, which are outlined above.     ED Discussion     6:48 AM Spoke with Dr. Kevin, he recommends that patient be placed in hospital for pain management.    7:00 AM:  [facility]  He recommends the patient be placed in observation for pain management under the care of Dr. Maldonado.     7:20 AM:  These recommendations were discussed with patient and family.  They agree to be observed in the hospital and verbalized plan of care.       Medical Decision Making:   Clinical Tests:   Lab Tests: Ordered and Reviewed  Radiological Study: Ordered and Reviewed           ED Medication(s):  Medications   promethazine (PHENERGAN) 12.5 mg in dextrose 5 % 50 mL IVPB (0 mg Intravenous Stopped 3/7/20 0101)   sodium chloride 0.9% bolus 1,000 mL (0 mLs Intravenous Stopped 3/7/20 0303)   hydromorphone (PF) injection 1 mg (1 mg Intravenous Given 3/7/20 0106)   hydromorphone (PF) injection 1 mg (1 mg Intravenous Given 3/7/20 0204)   iohexol (OMNIPAQUE 350) injection 100 mL (75 mLs Intravenous Given 3/7/20 0351)   iohexol (OMNIPAQUE 350) injection 50 mL (30 mLs Oral Given 3/7/20 0251)   promethazine (PHENERGAN) 12.5 mg in  dextrose 5 % 50 mL IVPB (12.5 mg Intravenous New Bag 3/7/20 0712)       New Prescriptions    No medications on file               Scribe Attestation:   Scribe #1: I performed the above scribed service and the documentation accurately describes the services I performed. I attest to the accuracy of the note.     Attending:   Physician Attestation Statement for Scribe #1: I, Farrah Posada DO, personally performed the services described in this documentation, as scribed by Cesia Osorio, in my presence, and it is both accurate and complete.       Scribe Attestation:   Scribe #1: I performed the above scribed service and the documentation accurately describes the services I performed. I attest to the accuracy of the note.    Attending Attestation:           Physician Attestation for Scribe:  Physician Attestation Statement for Scribe #1: I, Farrah Posada DO, reviewed documentation, as scribed by Cesia Osorio in my presence, and it is both accurate and complete.              Clinical Impression       ICD-10-CM ICD-9-CM   1. Intractable nausea and vomiting R11.2 536.2   2. Abdominal pain R10.9 789.00       Disposition:   Disposition: Placed in Observation  Condition: Stable  Reason for referral: Dr. Joel Posada DO  03/07/20 0844

## 2020-03-07 NOTE — ED NOTES
Assumed care of the patient at this time. Pt lying in bed comfortably. AAO x 4. Resp even and unlabored with equal chest rise and fall. Skin warm and dry. 20G PIV noted to R AC. Flushes well, drg CDI. Side rails up x 2. Call light within reach. No distress noted. Pt denies any needs or assist at this time. Family at bedside. Updated on plan of care.

## 2020-03-07 NOTE — ASSESSMENT & PLAN NOTE
The patient has received 1 dose of immunotherapy with nivolumab 1 week ago.  With increased abdominal discomfort would recommend that patient be started probably on Duragesic 50 mcg patch Q 72 hr.  And allow for control.  Combination of disease portal vein thrombosis as well as retroperitoneal lymphadenopathy.  I have spoken to the family about code status and they agreed to a do not resuscitate order he has had advance care directive planning placed.  In addition I would recommend that the patient if control is not achieved by 48 hr to consideration of radiation therapy to see whether not any stereotactic radiation could be offered to patient since he does have an area of pinpoint control.  Awaiting immunotherapy benefits at best 30-40% and noncurative setting

## 2020-03-07 NOTE — HPI
Ottoniel Arellano is an 79 year old male with CAD, arthritis, and HCC who presented for evaluation of worsening abd pain which onset gradually several days PTA. He reports chronic diffuse abdominal pain, nausea, decreased appetite and weight loss, but denies other symptoms including fever and chills. Pt reports that he has not been able to keep down any food or liquid. Of note, it appears the patient has been unable to take his oral medications due his chronic symptoms. In the ED, CT abdomen showed 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  No bowel obstruction or evidence for perforation or abscess. Patient placed in observation for intractable n/v and pain control. Code status was discussed with the patient. He is a full code. His wife, Maddy Arellano, is his surrogate medical decision maker.

## 2020-03-07 NOTE — SUBJECTIVE & OBJECTIVE
Oncology Treatment Plan:   OP NIVOLUMAB Q2W    Medications:  Continuous Infusions:   sodium chloride 0.9% 100 mL/hr at 03/07/20 1044     Scheduled Meds:   enoxaparin  1 mg/kg Subcutaneous Q12H    metoprolol  5 mg Intravenous Q8H    pantoprazole  40 mg Intravenous Daily     PRN Meds:hydrALAZINE, morphine, morphine, ondansetron, promethazine (PHENERGAN) IVPB     Review of patient's allergies indicates:   Allergen Reactions    Penicillins Hives and Rash    Shellfish containing products      Pt states face and hands turn blotchy red and itch        Past Medical History:   Diagnosis Date    Arthritis     Coronary artery disease     HCC (hepatocellular carcinoma)     Liver mass      Past Surgical History:   Procedure Laterality Date    ANGIOGRAM, CORONARY, WITH LEFT HEART CATHETERIZATION      stents 12/29/2012    EYE SURGERY      right, skin graft    FLUOROSCOPY N/A 1/21/2020    Procedure: Y90 Mapping;  Surgeon: Ar Ham MD;  Location: [facility] CATH LAB;  Service: General;  Laterality: N/A;    FLUOROSCOPY N/A 2/4/2020    Procedure: TheraSphere Liver Mass;  Surgeon: Ar Ham MD;  Location: [facility] CATH LAB;  Service: General;  Laterality: N/A;    SPINAL FUSION      ACDF 05/04/2010    TONSILLECTOMY       Family History     None        Tobacco Use    Smoking status: Former Smoker    Smokeless tobacco: Former User     Quit date: 5/18/1986   Substance and Sexual Activity    Alcohol use: No    Drug use: Not on file    Sexual activity: Not on file       Review of Systems   Constitutional: Positive for activity change and fatigue. Negative for appetite change, chills, diaphoresis, fever and unexpected weight change.   HENT: Negative for congestion, dental problem, drooling, ear discharge, ear pain, facial swelling, hearing loss, mouth sores, nosebleeds, postnasal drip, rhinorrhea, sinus pressure, sneezing, sore throat, tinnitus, trouble swallowing and voice change.    Eyes: Negative for photophobia,  pain, discharge, redness, itching and visual disturbance.   Respiratory: Negative for apnea, cough, choking, chest tightness, shortness of breath, wheezing and stridor.    Cardiovascular: Negative for chest pain, palpitations and leg swelling.   Gastrointestinal: Positive for abdominal distention and abdominal pain. Negative for anal bleeding, blood in stool, constipation, diarrhea, nausea, rectal pain and vomiting.   Endocrine: Negative for cold intolerance, heat intolerance, polydipsia, polyphagia and polyuria.   Genitourinary: Negative for decreased urine volume, difficulty urinating, discharge, dysuria, enuresis, flank pain, frequency, genital sores, hematuria, penile pain, penile swelling, scrotal swelling, testicular pain and urgency.   Musculoskeletal: Negative for arthralgias, back pain, gait problem, joint swelling, myalgias, neck pain and neck stiffness.   Skin: Negative for color change, pallor, rash and wound.   Allergic/Immunologic: Negative for environmental allergies, food allergies and immunocompromised state.   Neurological: Positive for weakness. Negative for dizziness, tremors, seizures, syncope, facial asymmetry, speech difficulty, light-headedness, numbness and headaches.   Hematological: Negative for adenopathy. Does not bruise/bleed easily.   Psychiatric/Behavioral: Positive for dysphoric mood. Negative for agitation, behavioral problems, confusion, decreased concentration, hallucinations, self-injury, sleep disturbance and suicidal ideas. The patient is nervous/anxious. The patient is not hyperactive.      Objective:     Vital Signs (Most Recent):  Temp: 98.1 °F (36.7 °C) (03/06/20 2358)  Pulse: 69 (03/07/20 1201)  Resp: 10 (03/07/20 1201)  BP: (!) 174/83 (03/07/20 1201)  SpO2: 96 % (03/07/20 1201) Vital Signs (24h Range):  Temp:  [98.1 °F (36.7 °C)] 98.1 °F (36.7 °C)  Pulse:  [] 69  Resp:  [10-18] 10  SpO2:  [95 %-98 %] 96 %  BP: (157-198)/(78-88) 174/83     Weight: 79 kg (174 lb 2.6  oz)  Body mass index is 24.99 kg/m².  Body surface area is 1.98 meters squared.      Intake/Output Summary (Last 24 hours) at 3/7/2020 1252  Last data filed at 3/7/2020 0303  Gross per 24 hour   Intake 1050 ml   Output --   Net 1050 ml       Physical Exam   Constitutional: He is oriented to person, place, and time. He appears cachectic. He has a sickly appearance. He appears ill. He appears distressed.   HENT:   Head: Normocephalic.   Right Ear: External ear normal.   Left Ear: External ear normal.   Nose: Nose normal. Right sinus exhibits no maxillary sinus tenderness and no frontal sinus tenderness. Left sinus exhibits no maxillary sinus tenderness and no frontal sinus tenderness.   Mouth/Throat: Oropharynx is clear and moist. No oropharyngeal exudate.   Eyes: Pupils are equal, round, and reactive to light. EOM and lids are normal. Right eye exhibits no discharge. Left eye exhibits no discharge. Right conjunctiva is not injected. Right conjunctiva has no hemorrhage. Left conjunctiva is not injected. Left conjunctiva has no hemorrhage. No scleral icterus. Right eye exhibits normal extraocular motion. Left eye exhibits normal extraocular motion.   Neck: Normal range of motion. Neck supple. No JVD present. No tracheal deviation present. No thyromegaly present.   Cardiovascular: Normal rate, regular rhythm and normal heart sounds.   Pulmonary/Chest: Effort normal and breath sounds normal. No stridor. No respiratory distress.   Abdominal: Soft. Bowel sounds are normal. He exhibits distension. He exhibits no mass. There is no hepatosplenomegaly, splenomegaly or hepatomegaly. There is tenderness.       Musculoskeletal: Normal range of motion. He exhibits no edema or tenderness.   Lymphadenopathy:        Head (right side): No posterior auricular and no occipital adenopathy present.        Head (left side): No posterior auricular and no occipital adenopathy present.     He has no cervical adenopathy.        Right cervical:  No superficial cervical, no deep cervical and no posterior cervical adenopathy present.       Left cervical: No superficial cervical, no deep cervical and no posterior cervical adenopathy present.     He has no axillary adenopathy.        Right: No supraclavicular adenopathy present.        Left: No supraclavicular adenopathy present.   Neurological: He is alert and oriented to person, place, and time. He has normal strength. No cranial nerve deficit. Coordination normal.   Skin: Skin is dry. No rash noted. He is not diaphoretic. No cyanosis or erythema. Nails show no clubbing.   Psychiatric: His behavior is normal. Judgment and thought content normal. His mood appears anxious. Cognition and memory are normal. He exhibits a depressed mood.   Vitals reviewed.      Significant Labs:   BMP:   Recent Labs   Lab 03/07/20 0031   *   *   K 4.0   CL 99   CO2 25   BUN 8   CREATININE 1.0   CALCIUM 9.8   , CBC:   Recent Labs   Lab 03/07/20 0031   WBC 5.00   HGB 12.7*   HCT 38.9*      , CMP:   Recent Labs   Lab 03/07/20 0031   *   K 4.0   CL 99   CO2 25   *   BUN 8   CREATININE 1.0   CALCIUM 9.8   PROT 7.6   ALBUMIN 3.4*   BILITOT 0.9   ALKPHOS 191*   AST 59*   ALT 23   ANIONGAP 11   EGFRNONAA >60   , Coagulation:   Recent Labs   Lab 03/07/20 0031   INR 1.1   APTT 28.2   , Haptoglobin: No results for input(s): HAPTOGLOBIN in the last 48 hours., Immunology: No results for input(s): SPEP, HANNAH, MOISES, FREELAMBDALI in the last 48 hours., LDH: No results for input(s): LDHCSF, BFSOURCE in the last 48 hours. and LFTs:   Recent Labs   Lab 03/07/20 0031   ALT 23   AST 59*   ALKPHOS 191*   BILITOT 0.9   PROT 7.6   ALBUMIN 3.4*       Diagnostic Results:  I have reviewed all pertinent imaging results/findings within the past 24 hours.

## 2020-03-07 NOTE — CONSULTS
[facility] -   Hematology/Oncology  Consult Note    Patient Name: Ottoniel Arellano Jr.  MRN: [id number]  Admission Date: 3/6/2020  Hospital Length of Stay: 0 days  Code Status: Full Code   Attending Provider: Farrah Posada DO  Consulting Provider: Jarod Kevin MD  Primary Care Physician: David Stevens MD  Principal Problem:Abdominal pain    Consults  Subjective:     HPI:  79-year-old male history of metastatic hepatocellular carcinoma having failed local therapy.  At this point the patient is admitted to hospital for abdominal pain in control.  Had tried previously MS Contin has not started on methadone.  At this point difficulty keeping solid foods down.  Was called by the emergency room after seeing the patient would recommend admission to the hospital for pain control patient is aware of do not resuscitate orders and has had advanced care planning    Oncology Treatment Plan:   OP NIVOLUMAB Q2W    Medications:  Continuous Infusions:   sodium chloride 0.9% 100 mL/hr at 03/07/20 1044     Scheduled Meds:   enoxaparin  1 mg/kg Subcutaneous Q12H    metoprolol  5 mg Intravenous Q8H    pantoprazole  40 mg Intravenous Daily     PRN Meds:hydrALAZINE, morphine, morphine, ondansetron, promethazine (PHENERGAN) IVPB     Review of patient's allergies indicates:   Allergen Reactions    Penicillins Hives and Rash    Shellfish containing products      Pt states face and hands turn blotchy red and itch        Past Medical History:   Diagnosis Date    Arthritis     Coronary artery disease     HCC (hepatocellular carcinoma)     Liver mass      Past Surgical History:   Procedure Laterality Date    ANGIOGRAM, CORONARY, WITH LEFT HEART CATHETERIZATION      stents 12/29/2012    EYE SURGERY      right, skin graft    FLUOROSCOPY N/A 1/21/2020    Procedure: Y90 Mapping;  Surgeon: Ar Ham MD;  Location: [facility] CATH LAB;  Service: General;  Laterality: N/A;    FLUOROSCOPY N/A 2/4/2020    Procedure:  TheraSphere Liver Mass;  Surgeon: Ar Ham MD;  Location: [facility] CATH LAB;  Service: General;  Laterality: N/A;    SPINAL FUSION      ACDF 05/04/2010    TONSILLECTOMY       Family History     None        Tobacco Use    Smoking status: Former Smoker    Smokeless tobacco: Former User     Quit date: 5/18/1986   Substance and Sexual Activity    Alcohol use: No    Drug use: Not on file    Sexual activity: Not on file       Review of Systems   Constitutional: Positive for activity change and fatigue. Negative for appetite change, chills, diaphoresis, fever and unexpected weight change.   HENT: Negative for congestion, dental problem, drooling, ear discharge, ear pain, facial swelling, hearing loss, mouth sores, nosebleeds, postnasal drip, rhinorrhea, sinus pressure, sneezing, sore throat, tinnitus, trouble swallowing and voice change.    Eyes: Negative for photophobia, pain, discharge, redness, itching and visual disturbance.   Respiratory: Negative for apnea, cough, choking, chest tightness, shortness of breath, wheezing and stridor.    Cardiovascular: Negative for chest pain, palpitations and leg swelling.   Gastrointestinal: Positive for abdominal distention and abdominal pain. Negative for anal bleeding, blood in stool, constipation, diarrhea, nausea, rectal pain and vomiting.   Endocrine: Negative for cold intolerance, heat intolerance, polydipsia, polyphagia and polyuria.   Genitourinary: Negative for decreased urine volume, difficulty urinating, discharge, dysuria, enuresis, flank pain, frequency, genital sores, hematuria, penile pain, penile swelling, scrotal swelling, testicular pain and urgency.   Musculoskeletal: Negative for arthralgias, back pain, gait problem, joint swelling, myalgias, neck pain and neck stiffness.   Skin: Negative for color change, pallor, rash and wound.   Allergic/Immunologic: Negative for environmental allergies, food allergies and immunocompromised state.   Neurological:  Positive for weakness. Negative for dizziness, tremors, seizures, syncope, facial asymmetry, speech difficulty, light-headedness, numbness and headaches.   Hematological: Negative for adenopathy. Does not bruise/bleed easily.   Psychiatric/Behavioral: Positive for dysphoric mood. Negative for agitation, behavioral problems, confusion, decreased concentration, hallucinations, self-injury, sleep disturbance and suicidal ideas. The patient is nervous/anxious. The patient is not hyperactive.      Objective:     Vital Signs (Most Recent):  Temp: 98.1 °F (36.7 °C) (03/06/20 2358)  Pulse: 69 (03/07/20 1201)  Resp: 10 (03/07/20 1201)  BP: (!) 174/83 (03/07/20 1201)  SpO2: 96 % (03/07/20 1201) Vital Signs (24h Range):  Temp:  [98.1 °F (36.7 °C)] 98.1 °F (36.7 °C)  Pulse:  [] 69  Resp:  [10-18] 10  SpO2:  [95 %-98 %] 96 %  BP: (157-198)/(78-88) 174/83     Weight: 79 kg (174 lb 2.6 oz)  Body mass index is 24.99 kg/m².  Body surface area is 1.98 meters squared.      Intake/Output Summary (Last 24 hours) at 3/7/2020 1252  Last data filed at 3/7/2020 0303  Gross per 24 hour   Intake 1050 ml   Output --   Net 1050 ml       Physical Exam   Constitutional: He is oriented to person, place, and time. He appears cachectic. He has a sickly appearance. He appears ill. He appears distressed.   HENT:   Head: Normocephalic.   Right Ear: External ear normal.   Left Ear: External ear normal.   Nose: Nose normal. Right sinus exhibits no maxillary sinus tenderness and no frontal sinus tenderness. Left sinus exhibits no maxillary sinus tenderness and no frontal sinus tenderness.   Mouth/Throat: Oropharynx is clear and moist. No oropharyngeal exudate.   Eyes: Pupils are equal, round, and reactive to light. EOM and lids are normal. Right eye exhibits no discharge. Left eye exhibits no discharge. Right conjunctiva is not injected. Right conjunctiva has no hemorrhage. Left conjunctiva is not injected. Left conjunctiva has no hemorrhage. No  scleral icterus. Right eye exhibits normal extraocular motion. Left eye exhibits normal extraocular motion.   Neck: Normal range of motion. Neck supple. No JVD present. No tracheal deviation present. No thyromegaly present.   Cardiovascular: Normal rate, regular rhythm and normal heart sounds.   Pulmonary/Chest: Effort normal and breath sounds normal. No stridor. No respiratory distress.   Abdominal: Soft. Bowel sounds are normal. He exhibits distension. He exhibits no mass. There is no hepatosplenomegaly, splenomegaly or hepatomegaly. There is tenderness.       Musculoskeletal: Normal range of motion. He exhibits no edema or tenderness.   Lymphadenopathy:        Head (right side): No posterior auricular and no occipital adenopathy present.        Head (left side): No posterior auricular and no occipital adenopathy present.     He has no cervical adenopathy.        Right cervical: No superficial cervical, no deep cervical and no posterior cervical adenopathy present.       Left cervical: No superficial cervical, no deep cervical and no posterior cervical adenopathy present.     He has no axillary adenopathy.        Right: No supraclavicular adenopathy present.        Left: No supraclavicular adenopathy present.   Neurological: He is alert and oriented to person, place, and time. He has normal strength. No cranial nerve deficit. Coordination normal.   Skin: Skin is dry. No rash noted. He is not diaphoretic. No cyanosis or erythema. Nails show no clubbing.   Psychiatric: His behavior is normal. Judgment and thought content normal. His mood appears anxious. Cognition and memory are normal. He exhibits a depressed mood.   Vitals reviewed.      Significant Labs:   BMP:   Recent Labs   Lab 03/07/20 0031   *   *   K 4.0   CL 99   CO2 25   BUN 8   CREATININE 1.0   CALCIUM 9.8   , CBC:   Recent Labs   Lab 03/07/20 0031   WBC 5.00   HGB 12.7*   HCT 38.9*      , CMP:   Recent Labs   Lab 03/07/20 0031    *   K 4.0   CL 99   CO2 25   *   BUN 8   CREATININE 1.0   CALCIUM 9.8   PROT 7.6   ALBUMIN 3.4*   BILITOT 0.9   ALKPHOS 191*   AST 59*   ALT 23   ANIONGAP 11   EGFRNONAA >60   , Coagulation:   Recent Labs   Lab 03/07/20  0031   INR 1.1   APTT 28.2   , Haptoglobin: No results for input(s): HAPTOGLOBIN in the last 48 hours., Immunology: No results for input(s): SPEP, HANNAH, MOISES, FREELAMBDALI in the last 48 hours., LDH: No results for input(s): LDHCSF, BFSOURCE in the last 48 hours. and LFTs:   Recent Labs   Lab 03/07/20  0031   ALT 23   AST 59*   ALKPHOS 191*   BILITOT 0.9   PROT 7.6   ALBUMIN 3.4*       Diagnostic Results:  I have reviewed all pertinent imaging results/findings within the past 24 hours.    Assessment/Plan:     Hepatocellular carcinoma  The patient has received 1 dose of immunotherapy with nivolumab 1 week ago.  With increased abdominal discomfort would recommend that patient be started probably on Duragesic 50 mcg patch Q 72 hr.  And allow for control.  Combination of disease portal vein thrombosis as well as retroperitoneal lymphadenopathy.  I have spoken to the family about code status and they agreed to a do not resuscitate order he has had advance care directive planning placed.  In addition I would recommend that the patient if control is not achieved by 48 hr to consideration of radiation therapy to see whether not any stereotactic radiation could be offered to patient since he does have an area of pinpoint control.  Awaiting immunotherapy benefits at best 30-40% and noncurative setting        Thank you for your consult. I will follow-up with patient. Please contact us if you have any additional questions.    Jarod Kevin MD  Hematology/Oncology  [facility] -

## 2020-03-08 LAB
ANION GAP SERPL CALC-SCNC: 16 MMOL/L (ref 8–16)
BASOPHILS # BLD AUTO: 0.04 K/UL (ref 0–0.2)
BASOPHILS NFR BLD: 0.8 % (ref 0–1.9)
BUN SERPL-MCNC: 7 MG/DL (ref 8–23)
CALCIUM SERPL-MCNC: 9.3 MG/DL (ref 8.7–10.5)
CHLORIDE SERPL-SCNC: 99 MMOL/L (ref 95–110)
CO2 SERPL-SCNC: 21 MMOL/L (ref 23–29)
CREAT SERPL-MCNC: 0.8 MG/DL (ref 0.5–1.4)
DIFFERENTIAL METHOD: ABNORMAL
EOSINOPHIL # BLD AUTO: 0.1 K/UL (ref 0–0.5)
EOSINOPHIL NFR BLD: 1 % (ref 0–8)
ERYTHROCYTE [DISTWIDTH] IN BLOOD BY AUTOMATED COUNT: 17 % (ref 11.5–14.5)
EST. GFR  (AFRICAN AMERICAN): >60 ML/MIN/1.73 M^2
EST. GFR  (NON AFRICAN AMERICAN): >60 ML/MIN/1.73 M^2
GLUCOSE SERPL-MCNC: 68 MG/DL (ref 70–110)
HCT VFR BLD AUTO: 39.1 % (ref 40–54)
HGB BLD-MCNC: 12.4 G/DL (ref 14–18)
IMM GRANULOCYTES # BLD AUTO: 0.02 K/UL (ref 0–0.04)
IMM GRANULOCYTES NFR BLD AUTO: 0.4 % (ref 0–0.5)
LYMPHOCYTES # BLD AUTO: 0.6 K/UL (ref 1–4.8)
LYMPHOCYTES NFR BLD: 12.7 % (ref 18–48)
MCH RBC QN AUTO: 27.7 PG (ref 27–31)
MCHC RBC AUTO-ENTMCNC: 31.7 G/DL (ref 32–36)
MCV RBC AUTO: 87 FL (ref 82–98)
MONOCYTES # BLD AUTO: 0.6 K/UL (ref 0.3–1)
MONOCYTES NFR BLD: 12.1 % (ref 4–15)
NEUTROPHILS # BLD AUTO: 3.6 K/UL (ref 1.8–7.7)
NEUTROPHILS NFR BLD: 73 % (ref 38–73)
NRBC BLD-RTO: 0 /100 WBC
PLATELET # BLD AUTO: 198 K/UL (ref 150–350)
PMV BLD AUTO: 10.4 FL (ref 9.2–12.9)
POTASSIUM SERPL-SCNC: 5 MMOL/L (ref 3.5–5.1)
RBC # BLD AUTO: 4.48 M/UL (ref 4.6–6.2)
SODIUM SERPL-SCNC: 136 MMOL/L (ref 136–145)
WBC # BLD AUTO: 4.88 K/UL (ref 3.9–12.7)

## 2020-03-08 PROCEDURE — 96376 TX/PRO/DX INJ SAME DRUG ADON: CPT | Performed by: EMERGENCY MEDICINE

## 2020-03-08 PROCEDURE — 36415 COLL VENOUS BLD VENIPUNCTURE: CPT

## 2020-03-08 PROCEDURE — C9113 INJ PANTOPRAZOLE SODIUM, VIA: HCPCS | Performed by: NURSE PRACTITIONER

## 2020-03-08 PROCEDURE — 25000003 PHARM REV CODE 250: Performed by: NURSE PRACTITIONER

## 2020-03-08 PROCEDURE — 80048 BASIC METABOLIC PNL TOTAL CA: CPT

## 2020-03-08 PROCEDURE — G0378 HOSPITAL OBSERVATION PER HR: HCPCS

## 2020-03-08 PROCEDURE — 85025 COMPLETE CBC W/AUTO DIFF WBC: CPT

## 2020-03-08 PROCEDURE — 96372 THER/PROPH/DIAG INJ SC/IM: CPT | Mod: 59 | Performed by: EMERGENCY MEDICINE

## 2020-03-08 PROCEDURE — 99214 PR OFFICE/OUTPT VISIT, EST, LEVL IV, 30-39 MIN: ICD-10-PCS | Mod: ,,, | Performed by: INTERNAL MEDICINE

## 2020-03-08 PROCEDURE — 25000003 PHARM REV CODE 250: Performed by: INTERNAL MEDICINE

## 2020-03-08 PROCEDURE — 63600175 PHARM REV CODE 636 W HCPCS: Performed by: NURSE PRACTITIONER

## 2020-03-08 PROCEDURE — 96361 HYDRATE IV INFUSION ADD-ON: CPT | Performed by: EMERGENCY MEDICINE

## 2020-03-08 PROCEDURE — 99214 OFFICE O/P EST MOD 30 MIN: CPT | Mod: ,,, | Performed by: INTERNAL MEDICINE

## 2020-03-08 RX ORDER — FENTANYL 50 UG/1
1 PATCH TRANSDERMAL
Status: DISCONTINUED | OUTPATIENT
Start: 2020-03-08 | End: 2020-03-10

## 2020-03-08 RX ADMIN — METOPROLOL TARTRATE 5 MG: 5 INJECTION INTRAVENOUS at 05:03

## 2020-03-08 RX ADMIN — METOPROLOL TARTRATE 5 MG: 5 INJECTION INTRAVENOUS at 10:03

## 2020-03-08 RX ADMIN — SODIUM CHLORIDE: 0.9 INJECTION, SOLUTION INTRAVENOUS at 04:03

## 2020-03-08 RX ADMIN — ONDANSETRON HYDROCHLORIDE 8 MG: 2 SOLUTION INTRAMUSCULAR; INTRAVENOUS at 10:03

## 2020-03-08 RX ADMIN — ENOXAPARIN SODIUM 80 MG: 100 INJECTION, SOLUTION INTRAVENOUS; SUBCUTANEOUS at 10:03

## 2020-03-08 RX ADMIN — METOPROLOL TARTRATE 5 MG: 5 INJECTION INTRAVENOUS at 02:03

## 2020-03-08 RX ADMIN — HYDRALAZINE HYDROCHLORIDE 10 MG: 20 INJECTION INTRAMUSCULAR; INTRAVENOUS at 07:03

## 2020-03-08 RX ADMIN — MORPHINE SULFATE 4 MG: 4 INJECTION, SOLUTION INTRAMUSCULAR; INTRAVENOUS at 08:03

## 2020-03-08 RX ADMIN — PANTOPRAZOLE SODIUM 40 MG: 40 INJECTION, POWDER, LYOPHILIZED, FOR SOLUTION INTRAVENOUS at 08:03

## 2020-03-08 RX ADMIN — FENTANYL 1 PATCH: 50 PATCH, EXTENDED RELEASE TRANSDERMAL at 10:03

## 2020-03-08 RX ADMIN — ONDANSETRON HYDROCHLORIDE 8 MG: 2 SOLUTION INTRAMUSCULAR; INTRAVENOUS at 02:03

## 2020-03-08 NOTE — PROGRESS NOTES
[facility] -   Hematology/Oncology  Progress Note    Patient Name: Ottoniel Arellano Jr.  Admission Date: 3/6/2020  Hospital Length of Stay: 0 days  Code Status: Full Code     Subjective:     HPI:  79-year-old male history of metastatic hepatocellular carcinoma having failed local therapy.  At this point the patient is admitted to hospital for abdominal pain in control.  Had tried previously MS Contin has not started on methadone.  At this point difficulty keeping solid foods down.  Was called by the emergency room after seeing the patient would recommend admission to the hospital for pain control patient is aware of do not resuscitate orders and has had advanced care planning    Interval History:  Patient appears to be resting little bit more comfortably but still states that he has abdominal discomfort Duragesic patch not placed    Oncology Treatment Plan:   OP NIVOLUMAB Q2W    Medications:  Continuous Infusions:   sodium chloride 0.9% 100 mL/hr at 03/08/20 0434     Scheduled Meds:   enoxaparin  1 mg/kg Subcutaneous Q12H    fentaNYL  1 patch Transdermal Q72H    metoprolol  5 mg Intravenous Q8H    pantoprazole  40 mg Intravenous Daily     PRN Meds:artificial tears, hydrALAZINE, morphine, morphine, ondansetron, promethazine (PHENERGAN) IVPB     Review of Systems   Constitutional: Positive for activity change, appetite change and fatigue. Negative for chills, diaphoresis, fever and unexpected weight change.   HENT: Negative for congestion, dental problem, drooling, ear discharge, ear pain, facial swelling, hearing loss, mouth sores, nosebleeds, postnasal drip, rhinorrhea, sinus pressure, sneezing, sore throat, tinnitus, trouble swallowing and voice change.    Eyes: Negative for photophobia, pain, discharge, redness, itching and visual disturbance.   Respiratory: Negative for apnea, cough, choking, chest tightness, shortness of breath, wheezing and stridor.    Cardiovascular: Negative for chest pain,  palpitations and leg swelling.   Gastrointestinal: Positive for abdominal distention and abdominal pain. Negative for anal bleeding, blood in stool, constipation, diarrhea, nausea, rectal pain and vomiting.   Endocrine: Negative for cold intolerance, heat intolerance, polydipsia, polyphagia and polyuria.   Genitourinary: Negative for decreased urine volume, difficulty urinating, discharge, dysuria, enuresis, flank pain, frequency, genital sores, hematuria, penile pain, penile swelling, scrotal swelling, testicular pain and urgency.   Musculoskeletal: Negative for arthralgias, back pain, gait problem, joint swelling, myalgias, neck pain and neck stiffness.   Skin: Negative for color change, pallor, rash and wound.   Allergic/Immunologic: Negative for environmental allergies, food allergies and immunocompromised state.   Neurological: Positive for weakness. Negative for dizziness, tremors, seizures, syncope, facial asymmetry, speech difficulty, light-headedness, numbness and headaches.   Hematological: Negative for adenopathy. Does not bruise/bleed easily.   Psychiatric/Behavioral: Positive for dysphoric mood. Negative for agitation, behavioral problems, confusion, decreased concentration, hallucinations, self-injury, sleep disturbance and suicidal ideas. The patient is nervous/anxious. The patient is not hyperactive.      Objective:     Vital Signs (Most Recent):  Temp: 98.5 °F (36.9 °C) (03/08/20 0810)  Pulse: 66 (03/08/20 0810)  Resp: 18 (03/08/20 0810)  BP: (!) 188/84 (03/08/20 0810)  SpO2: 96 % (03/08/20 0810) Vital Signs (24h Range):  Temp:  [97.3 °F (36.3 °C)-98.5 °F (36.9 °C)] 98.5 °F (36.9 °C)  Pulse:  [56-74] 66  Resp:  [10-18] 18  SpO2:  [95 %-98 %] 96 %  BP: (155-198)/(71-87) 188/84     Weight: 79.8 kg (175 lb 14.8 oz)  Body mass index is 25.24 kg/m².  Body surface area is 1.99 meters squared.      Intake/Output Summary (Last 24 hours) at 3/8/2020 0810  Last data filed at 3/8/2020 0500  Gross per 24 hour    Intake --   Output 200 ml   Net -200 ml       Physical Exam   Constitutional: He is oriented to person, place, and time. He has a sickly appearance. He appears ill. He appears distressed.   HENT:   Head: Normocephalic.   Right Ear: External ear normal.   Left Ear: External ear normal.   Nose: Nose normal. Right sinus exhibits no maxillary sinus tenderness and no frontal sinus tenderness. Left sinus exhibits no maxillary sinus tenderness and no frontal sinus tenderness.   Mouth/Throat: Oropharynx is clear and moist. No oropharyngeal exudate.   Eyes: Pupils are equal, round, and reactive to light. EOM and lids are normal. Right eye exhibits no discharge. Left eye exhibits no discharge. Right conjunctiva is not injected. Right conjunctiva has no hemorrhage. Left conjunctiva is not injected. Left conjunctiva has no hemorrhage. No scleral icterus. Right eye exhibits normal extraocular motion. Left eye exhibits normal extraocular motion.   Neck: Normal range of motion. Neck supple. No JVD present. No tracheal deviation present. No thyromegaly present.   Cardiovascular: Normal rate and regular rhythm.   Pulmonary/Chest: Effort normal. No stridor. No respiratory distress.   Abdominal: Soft. He exhibits no mass. There is no hepatosplenomegaly, splenomegaly or hepatomegaly. There is no tenderness.   Musculoskeletal: Normal range of motion. He exhibits no edema or tenderness.   Lymphadenopathy:        Head (right side): No posterior auricular and no occipital adenopathy present.        Head (left side): No posterior auricular and no occipital adenopathy present.     He has no cervical adenopathy.        Right cervical: No superficial cervical, no deep cervical and no posterior cervical adenopathy present.       Left cervical: No superficial cervical, no deep cervical and no posterior cervical adenopathy present.     He has no axillary adenopathy.        Right: No supraclavicular adenopathy present.        Left: No  supraclavicular adenopathy present.   Neurological: He is alert and oriented to person, place, and time. He has normal strength. No cranial nerve deficit. Coordination normal.   Skin: Skin is dry. No rash noted. He is not diaphoretic. No cyanosis or erythema. Nails show no clubbing.   Psychiatric: His behavior is normal. Judgment and thought content normal. His mood appears anxious. Cognition and memory are normal. He exhibits a depressed mood.   Vitals reviewed.      Significant Labs:   BMP:   Recent Labs   Lab 03/07/20 0031 03/08/20 0436   * 68*   * 136   K 4.0 5.0   CL 99 99   CO2 25 21*   BUN 8 7*   CREATININE 1.0 0.8   CALCIUM 9.8 9.3   , CBC:   Recent Labs   Lab 03/07/20 0031 03/08/20 0436   WBC 5.00 4.88   HGB 12.7* 12.4*   HCT 38.9* 39.1*    198   , CMP:   Recent Labs   Lab 03/07/20 0031 03/08/20 0436   * 136   K 4.0 5.0   CL 99 99   CO2 25 21*   * 68*   BUN 8 7*   CREATININE 1.0 0.8   CALCIUM 9.8 9.3   PROT 7.6  --    ALBUMIN 3.4*  --    BILITOT 0.9  --    ALKPHOS 191*  --    AST 59*  --    ALT 23  --    ANIONGAP 11 16   EGFRNONAA >60 >60   , Coagulation:   Recent Labs   Lab 03/07/20 0031   INR 1.1   APTT 28.2   , Haptoglobin: No results for input(s): HAPTOGLOBIN in the last 48 hours., Immunology: No results for input(s): SPEP, HANNAH, MOISES, FREELAMBDALI in the last 48 hours., LDH: No results for input(s): LDHCSF, BFSOURCE in the last 48 hours., LFTs:   Recent Labs   Lab 03/07/20 0031   ALT 23   AST 59*   ALKPHOS 191*   BILITOT 0.9   PROT 7.6   ALBUMIN 3.4*    and Reticulocytes: No results for input(s): RETIC in the last 48 hours.    Diagnostic Results:  I have reviewed all pertinent imaging results/findings within the past 24 hours.    Assessment/Plan:     Hepatocellular carcinoma  The patient has received 1 dose of immunotherapy with nivolumab 1 week ago.  With increased abdominal discomfort would recommend that patient be started probably on Duragesic 50 mcg patch Q  72 hr.  And allow for control.  Combination of disease portal vein thrombosis as well as retroperitoneal lymphadenopathy.  I have spoken to the family about code status and they agreed to a do not resuscitate order he has had advance care directive planning placed.  In addition I would recommend that the patient if control is not achieved by 48 hr to consideration of radiation therapy to see whether not any stereotactic radiation could be offered to patient since he does have an area of pinpoint control.  Awaiting immunotherapy benefits at best 30-40% and noncurative setting.  03/08/2020 patient's abdominal discomfort appears to be a little bit less intense this morning but still of concern spoke with he and his wife at bedside Duragesic patches not been placed as of yet I have spoken to Hospital Medicine recommended that Duragesic patch be placed explain the rationale for doing this because of his inability to completely swallow are predictable basis his MS Contin he had not had his methadone filled.  As recommended by palliative care.  Discussed implications with him and hopefully will have radiation therapy evaluation for possible stereotactic radiation if beneficial        Thank you for your consult. I will follow-up with patient. Please contact us if you have any additional questions.     Jarod Kevin MD  Hematology/Oncology  [facility] -

## 2020-03-08 NOTE — SUBJECTIVE & OBJECTIVE
Interval History:  Patient appears to be resting little bit more comfortably but still states that he has abdominal discomfort Duragesic patch not placed    Oncology Treatment Plan:   OP NIVOLUMAB Q2W    Medications:  Continuous Infusions:   sodium chloride 0.9% 100 mL/hr at 03/08/20 0434     Scheduled Meds:   enoxaparin  1 mg/kg Subcutaneous Q12H    fentaNYL  1 patch Transdermal Q72H    metoprolol  5 mg Intravenous Q8H    pantoprazole  40 mg Intravenous Daily     PRN Meds:artificial tears, hydrALAZINE, morphine, morphine, ondansetron, promethazine (PHENERGAN) IVPB     Review of Systems   Constitutional: Positive for activity change, appetite change and fatigue. Negative for chills, diaphoresis, fever and unexpected weight change.   HENT: Negative for congestion, dental problem, drooling, ear discharge, ear pain, facial swelling, hearing loss, mouth sores, nosebleeds, postnasal drip, rhinorrhea, sinus pressure, sneezing, sore throat, tinnitus, trouble swallowing and voice change.    Eyes: Negative for photophobia, pain, discharge, redness, itching and visual disturbance.   Respiratory: Negative for apnea, cough, choking, chest tightness, shortness of breath, wheezing and stridor.    Cardiovascular: Negative for chest pain, palpitations and leg swelling.   Gastrointestinal: Positive for abdominal distention and abdominal pain. Negative for anal bleeding, blood in stool, constipation, diarrhea, nausea, rectal pain and vomiting.   Endocrine: Negative for cold intolerance, heat intolerance, polydipsia, polyphagia and polyuria.   Genitourinary: Negative for decreased urine volume, difficulty urinating, discharge, dysuria, enuresis, flank pain, frequency, genital sores, hematuria, penile pain, penile swelling, scrotal swelling, testicular pain and urgency.   Musculoskeletal: Negative for arthralgias, back pain, gait problem, joint swelling, myalgias, neck pain and neck stiffness.   Skin: Negative for color change,  pallor, rash and wound.   Allergic/Immunologic: Negative for environmental allergies, food allergies and immunocompromised state.   Neurological: Positive for weakness. Negative for dizziness, tremors, seizures, syncope, facial asymmetry, speech difficulty, light-headedness, numbness and headaches.   Hematological: Negative for adenopathy. Does not bruise/bleed easily.   Psychiatric/Behavioral: Positive for dysphoric mood. Negative for agitation, behavioral problems, confusion, decreased concentration, hallucinations, self-injury, sleep disturbance and suicidal ideas. The patient is nervous/anxious. The patient is not hyperactive.      Objective:     Vital Signs (Most Recent):  Temp: 98.5 °F (36.9 °C) (03/08/20 0810)  Pulse: 66 (03/08/20 0810)  Resp: 18 (03/08/20 0810)  BP: (!) 188/84 (03/08/20 0810)  SpO2: 96 % (03/08/20 0810) Vital Signs (24h Range):  Temp:  [97.3 °F (36.3 °C)-98.5 °F (36.9 °C)] 98.5 °F (36.9 °C)  Pulse:  [56-74] 66  Resp:  [10-18] 18  SpO2:  [95 %-98 %] 96 %  BP: (155-198)/(71-87) 188/84     Weight: 79.8 kg (175 lb 14.8 oz)  Body mass index is 25.24 kg/m².  Body surface area is 1.99 meters squared.      Intake/Output Summary (Last 24 hours) at 3/8/2020 0810  Last data filed at 3/8/2020 0500  Gross per 24 hour   Intake --   Output 200 ml   Net -200 ml       Physical Exam   Constitutional: He is oriented to person, place, and time. He has a sickly appearance. He appears ill. He appears distressed.   HENT:   Head: Normocephalic.   Right Ear: External ear normal.   Left Ear: External ear normal.   Nose: Nose normal. Right sinus exhibits no maxillary sinus tenderness and no frontal sinus tenderness. Left sinus exhibits no maxillary sinus tenderness and no frontal sinus tenderness.   Mouth/Throat: Oropharynx is clear and moist. No oropharyngeal exudate.   Eyes: Pupils are equal, round, and reactive to light. EOM and lids are normal. Right eye exhibits no discharge. Left eye exhibits no discharge. Right  conjunctiva is not injected. Right conjunctiva has no hemorrhage. Left conjunctiva is not injected. Left conjunctiva has no hemorrhage. No scleral icterus. Right eye exhibits normal extraocular motion. Left eye exhibits normal extraocular motion.   Neck: Normal range of motion. Neck supple. No JVD present. No tracheal deviation present. No thyromegaly present.   Cardiovascular: Normal rate and regular rhythm.   Pulmonary/Chest: Effort normal. No stridor. No respiratory distress.   Abdominal: Soft. He exhibits no mass. There is no hepatosplenomegaly, splenomegaly or hepatomegaly. There is no tenderness.   Musculoskeletal: Normal range of motion. He exhibits no edema or tenderness.   Lymphadenopathy:        Head (right side): No posterior auricular and no occipital adenopathy present.        Head (left side): No posterior auricular and no occipital adenopathy present.     He has no cervical adenopathy.        Right cervical: No superficial cervical, no deep cervical and no posterior cervical adenopathy present.       Left cervical: No superficial cervical, no deep cervical and no posterior cervical adenopathy present.     He has no axillary adenopathy.        Right: No supraclavicular adenopathy present.        Left: No supraclavicular adenopathy present.   Neurological: He is alert and oriented to person, place, and time. He has normal strength. No cranial nerve deficit. Coordination normal.   Skin: Skin is dry. No rash noted. He is not diaphoretic. No cyanosis or erythema. Nails show no clubbing.   Psychiatric: His behavior is normal. Judgment and thought content normal. His mood appears anxious. Cognition and memory are normal. He exhibits a depressed mood.   Vitals reviewed.      Significant Labs:   BMP:   Recent Labs   Lab 03/07/20 0031 03/08/20  0436   * 68*   * 136   K 4.0 5.0   CL 99 99   CO2 25 21*   BUN 8 7*   CREATININE 1.0 0.8   CALCIUM 9.8 9.3   , CBC:   Recent Labs   Lab 03/07/20 0031  03/08/20 0436   WBC 5.00 4.88   HGB 12.7* 12.4*   HCT 38.9* 39.1*    198   , CMP:   Recent Labs   Lab 03/07/20 0031 03/08/20 0436   * 136   K 4.0 5.0   CL 99 99   CO2 25 21*   * 68*   BUN 8 7*   CREATININE 1.0 0.8   CALCIUM 9.8 9.3   PROT 7.6  --    ALBUMIN 3.4*  --    BILITOT 0.9  --    ALKPHOS 191*  --    AST 59*  --    ALT 23  --    ANIONGAP 11 16   EGFRNONAA >60 >60   , Coagulation:   Recent Labs   Lab 03/07/20 0031   INR 1.1   APTT 28.2   , Haptoglobin: No results for input(s): HAPTOGLOBIN in the last 48 hours., Immunology: No results for input(s): SPEP, HANNAH, MOISES, FREELAMBDALI in the last 48 hours., LDH: No results for input(s): LDHCSF, BFSOURCE in the last 48 hours., LFTs:   Recent Labs   Lab 03/07/20 0031   ALT 23   AST 59*   ALKPHOS 191*   BILITOT 0.9   PROT 7.6   ALBUMIN 3.4*    and Reticulocytes: No results for input(s): RETIC in the last 48 hours.    Diagnostic Results:  I have reviewed all pertinent imaging results/findings within the past 24 hours.

## 2020-03-08 NOTE — ASSESSMENT & PLAN NOTE
The patient has received 1 dose of immunotherapy with nivolumab 1 week ago.  With increased abdominal discomfort would recommend that patient be started probably on Duragesic 50 mcg patch Q 72 hr.  And allow for control.  Combination of disease portal vein thrombosis as well as retroperitoneal lymphadenopathy.  I have spoken to the family about code status and they agreed to a do not resuscitate order he has had advance care directive planning placed.  In addition I would recommend that the patient if control is not achieved by 48 hr to consideration of radiation therapy to see whether not any stereotactic radiation could be offered to patient since he does have an area of pinpoint control.  Awaiting immunotherapy benefits at best 30-40% and noncurative setting.  03/08/2020 patient's abdominal discomfort appears to be a little bit less intense this morning but still of concern spoke with he and his wife at bedside Duragesic patches not been placed as of yet I have spoken to Hospital Medicine recommended that Duragesic patch be placed explain the rationale for doing this because of his inability to completely swallow are predictable basis his MS Contin he had not had his methadone filled.  As recommended by palliative care.  Discussed implications with him and hopefully will have radiation therapy evaluation for possible stereotactic radiation if beneficial

## 2020-03-08 NOTE — PLAN OF CARE
Patient admitted and oriented to unit.  Cardiac monitor placed. Patient running normal sinus on the monitor.  Patient is ambulatory with stand-by assist. Patient educated on his risk for falls. Patient refuses bed alarm.  IV morphine and IV zofran administered.  Will continue to monitor and treat.

## 2020-03-08 NOTE — SUBJECTIVE & OBJECTIVE
Interval History:  Continues to have nausea partially controlled but improved.  Appetite returning.  Intermittent abdominal pain better controlled.  Started Fentanyl patch 50 mcg.  Trial of clear liquids then resume oral medications.    Review of Systems   Constitutional: Negative.  Negative for chills and fever.   HENT: Negative.  Negative for congestion and sore throat.    Eyes: Negative.  Negative for visual disturbance.   Respiratory: Negative.  Negative for cough, shortness of breath and wheezing.    Cardiovascular: Negative.  Negative for chest pain.   Gastrointestinal: Positive for abdominal pain and nausea. Negative for diarrhea and vomiting.   Endocrine: Negative.    Genitourinary: Negative.    Musculoskeletal: Negative.  Negative for myalgias and neck stiffness.   Skin: Negative.  Negative for color change and pallor.   Allergic/Immunologic: Negative.    Neurological: Negative.    Hematological: Negative.    Psychiatric/Behavioral: Negative.    All other systems reviewed and are negative.    Objective:     Vital Signs (Most Recent):  Temp: 98.1 °F (36.7 °C) (03/08/20 1507)  Pulse: 68 (03/08/20 1507)  Resp: 18 (03/08/20 1251)  BP: (!) 200/91 (03/08/20 1501)  SpO2: 97 % (03/08/20 1507) Vital Signs (24h Range):  Temp:  [97.3 °F (36.3 °C)-98.5 °F (36.9 °C)] 98.1 °F (36.7 °C)  Pulse:  [60-79] 68  Resp:  [14-18] 18  SpO2:  [96 %-98 %] 97 %  BP: (155-200)/(71-91) 200/91     Weight: 79.8 kg (175 lb 14.8 oz)  Body mass index is 25.24 kg/m².    Intake/Output Summary (Last 24 hours) at 3/8/2020 1600  Last data filed at 3/8/2020 0500  Gross per 24 hour   Intake --   Output 200 ml   Net -200 ml      Physical Exam   Constitutional: He is oriented to person, place, and time. He appears well-developed and well-nourished. No distress.   HENT:   Head: Normocephalic and atraumatic.   Mouth/Throat: Oropharynx is clear and moist.   Eyes: Pupils are equal, round, and reactive to light. Conjunctivae and EOM are normal.   Neck:  No JVD present. No thyromegaly present.   Cardiovascular: Normal rate, regular rhythm and normal heart sounds. Exam reveals no gallop and no friction rub.   No murmur heard.  Pulmonary/Chest: Effort normal and breath sounds normal. No respiratory distress. He has no wheezes. He has no rales.   Abdominal: Soft. Bowel sounds are normal. He exhibits no distension. There is tenderness. There is no rebound and no guarding.   Musculoskeletal: Normal range of motion. He exhibits no edema or tenderness.   Lymphadenopathy:     He has no cervical adenopathy.   Neurological: He is alert and oriented to person, place, and time. He has normal reflexes. He displays normal reflexes. No cranial nerve deficit.   Skin: Skin is warm and dry. No rash noted. He is not diaphoretic. No erythema.   Psychiatric: He has a normal mood and affect. His behavior is normal. Judgment and thought content normal.       Significant Labs: All pertinent labs within the past 24 hours have been reviewed.    Significant Imaging: I have reviewed all pertinent imaging results/findings within the past 24 hours.

## 2020-03-08 NOTE — ASSESSMENT & PLAN NOTE
-CT abdomen imaging showed 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  No bowel obstruction or evidence for perforation or abscess. No change from previous imaging.   -Analgesics and antiemetics prn   - Continue symptomatic care.   -Monitor LFTs.   Add Fentanyl patch.  Start CLD.

## 2020-03-08 NOTE — PROGRESS NOTES
[facility] Hospital Medicine  Progress Note    Patient Name: Ottoniel Arellano Jr.  MRN: [id number]  Patient Class: OP- Observation   Admission Date: 3/6/2020  Length of Stay: 0 days  Attending Physician: Ko Maldonado MD  Primary Care Provider: David Stevens MD        Subjective:     Principal Problem:Abdominal pain    HPI:  Ottoniel Arellano is an 79 year old male with CAD, arthritis, and HCC who presented for evaluation of worsening abd pain which onset gradually several days PTA. He reports chronic diffuse abdominal pain, nausea, decreased appetite and weight loss, but denies other symptoms including fever and chills. Pt reports that he has not been able to keep down any food or liquid. Of note, it appears the patient has been unable to take his oral medications due his chronic symptoms. In the ED, CT abdomen showed 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  No bowel obstruction or evidence for perforation or abscess. Patient placed in observation for intractable n/v and pain control. Code status was discussed with the patient. He is a full code. His wife, Maddy Arellano, is his surrogate medical decision maker.           Overview/Hospital Course:  No notes on file    Interval History:  Continues to have nausea partially controlled but improved.  Appetite returning.  Intermittent abdominal pain better controlled.  Started Fentanyl patch 50 mcg.  Trial of clear liquids then resume oral medications.    Review of Systems   Constitutional: Negative.  Negative for chills and fever.   HENT: Negative.  Negative for congestion and sore throat.    Eyes: Negative.  Negative for visual disturbance.   Respiratory: Negative.  Negative for cough, shortness of breath and wheezing.    Cardiovascular: Negative.  Negative for chest pain.   Gastrointestinal: Positive for abdominal pain and nausea. Negative for diarrhea and vomiting.    Endocrine: Negative.    Genitourinary: Negative.    Musculoskeletal: Negative.  Negative for myalgias and neck stiffness.   Skin: Negative.  Negative for color change and pallor.   Allergic/Immunologic: Negative.    Neurological: Negative.    Hematological: Negative.    Psychiatric/Behavioral: Negative.    All other systems reviewed and are negative.    Objective:     Vital Signs (Most Recent):  Temp: 98.1 °F (36.7 °C) (03/08/20 1507)  Pulse: 68 (03/08/20 1507)  Resp: 18 (03/08/20 1251)  BP: (!) 200/91 (03/08/20 1501)  SpO2: 97 % (03/08/20 1507) Vital Signs (24h Range):  Temp:  [97.3 °F (36.3 °C)-98.5 °F (36.9 °C)] 98.1 °F (36.7 °C)  Pulse:  [60-79] 68  Resp:  [14-18] 18  SpO2:  [96 %-98 %] 97 %  BP: (155-200)/(71-91) 200/91     Weight: 79.8 kg (175 lb 14.8 oz)  Body mass index is 25.24 kg/m².    Intake/Output Summary (Last 24 hours) at 3/8/2020 1600  Last data filed at 3/8/2020 0500  Gross per 24 hour   Intake --   Output 200 ml   Net -200 ml      Physical Exam   Constitutional: He is oriented to person, place, and time. He appears well-developed and well-nourished. No distress.   HENT:   Head: Normocephalic and atraumatic.   Mouth/Throat: Oropharynx is clear and moist.   Eyes: Pupils are equal, round, and reactive to light. Conjunctivae and EOM are normal.   Neck: No JVD present. No thyromegaly present.   Cardiovascular: Normal rate, regular rhythm and normal heart sounds. Exam reveals no gallop and no friction rub.   No murmur heard.  Pulmonary/Chest: Effort normal and breath sounds normal. No respiratory distress. He has no wheezes. He has no rales.   Abdominal: Soft. Bowel sounds are normal. He exhibits no distension. There is tenderness. There is no rebound and no guarding.   Musculoskeletal: Normal range of motion. He exhibits no edema or tenderness.   Lymphadenopathy:     He has no cervical adenopathy.   Neurological: He is alert and oriented to person, place, and time. He has normal reflexes. He displays  normal reflexes. No cranial nerve deficit.   Skin: Skin is warm and dry. No rash noted. He is not diaphoretic. No erythema.   Psychiatric: He has a normal mood and affect. His behavior is normal. Judgment and thought content normal.       Significant Labs: All pertinent labs within the past 24 hours have been reviewed.    Significant Imaging: I have reviewed all pertinent imaging results/findings within the past 24 hours.      Assessment/Plan:      * Abdominal pain  -CT abdomen imaging showed 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  No bowel obstruction or evidence for perforation or abscess. No change from previous imaging.   -Analgesics and antiemetics prn   - Continue symptomatic care.   -Monitor LFTs.   Add Fentanyl patch.  Start CLD.    Intractable nausea and vomiting  IVFs   Antiemetics prn       Essential hypertension  IV metoprolol q8 hr   IV hydralazine prn   Monitor       Hepatocellular carcinoma  -Followed by Dr. Kevin outpatient   -Patient received 1st dose of Nivolamab therapy on 3/3/20  -Oncology consulted   -Supportive care       CAD (coronary artery disease)  Pt NPO   Denies chest pain or shortness of breath.  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

## 2020-03-08 NOTE — PLAN OF CARE
POC reviwed,verbalized understanding.Pt remained free from falls. Fall precautions in place. Pt is SR on monitor. VSS. PIV intact. Call bell and personal belongings within reach. Hourly rounding complete. No c/o at this time. Reminded to call for assistance. Will continue to monitor.

## 2020-03-09 PROBLEM — Z71.89 ACP (ADVANCE CARE PLANNING): Status: ACTIVE | Noted: 2020-03-09

## 2020-03-09 LAB
ANION GAP SERPL CALC-SCNC: 13 MMOL/L (ref 8–16)
BASOPHILS # BLD AUTO: 0.03 K/UL (ref 0–0.2)
BASOPHILS NFR BLD: 0.6 % (ref 0–1.9)
BUN SERPL-MCNC: 8 MG/DL (ref 8–23)
CALCIUM SERPL-MCNC: 9.1 MG/DL (ref 8.7–10.5)
CHLORIDE SERPL-SCNC: 102 MMOL/L (ref 95–110)
CO2 SERPL-SCNC: 21 MMOL/L (ref 23–29)
CREAT SERPL-MCNC: 0.7 MG/DL (ref 0.5–1.4)
DIFFERENTIAL METHOD: ABNORMAL
EOSINOPHIL # BLD AUTO: 0.1 K/UL (ref 0–0.5)
EOSINOPHIL NFR BLD: 1.5 % (ref 0–8)
ERYTHROCYTE [DISTWIDTH] IN BLOOD BY AUTOMATED COUNT: 17.2 % (ref 11.5–14.5)
EST. GFR  (AFRICAN AMERICAN): >60 ML/MIN/1.73 M^2
EST. GFR  (NON AFRICAN AMERICAN): >60 ML/MIN/1.73 M^2
GLUCOSE SERPL-MCNC: 73 MG/DL (ref 70–110)
HCT VFR BLD AUTO: 39.4 % (ref 40–54)
HGB BLD-MCNC: 12.5 G/DL (ref 14–18)
IMM GRANULOCYTES # BLD AUTO: 0.01 K/UL (ref 0–0.04)
IMM GRANULOCYTES NFR BLD AUTO: 0.2 % (ref 0–0.5)
LYMPHOCYTES # BLD AUTO: 0.6 K/UL (ref 1–4.8)
LYMPHOCYTES NFR BLD: 12.8 % (ref 18–48)
MCH RBC QN AUTO: 27.3 PG (ref 27–31)
MCHC RBC AUTO-ENTMCNC: 31.7 G/DL (ref 32–36)
MCV RBC AUTO: 86 FL (ref 82–98)
MONOCYTES # BLD AUTO: 0.6 K/UL (ref 0.3–1)
MONOCYTES NFR BLD: 13.4 % (ref 4–15)
NEUTROPHILS # BLD AUTO: 3.3 K/UL (ref 1.8–7.7)
NEUTROPHILS NFR BLD: 71.5 % (ref 38–73)
NRBC BLD-RTO: 0 /100 WBC
PLATELET # BLD AUTO: 204 K/UL (ref 150–350)
PMV BLD AUTO: 10.5 FL (ref 9.2–12.9)
POTASSIUM SERPL-SCNC: 4 MMOL/L (ref 3.5–5.1)
RBC # BLD AUTO: 4.58 M/UL (ref 4.6–6.2)
SODIUM SERPL-SCNC: 136 MMOL/L (ref 136–145)
WBC # BLD AUTO: 4.62 K/UL (ref 3.9–12.7)

## 2020-03-09 PROCEDURE — 21400001 HC TELEMETRY ROOM

## 2020-03-09 PROCEDURE — 25000003 PHARM REV CODE 250: Performed by: INTERNAL MEDICINE

## 2020-03-09 PROCEDURE — 99215 OFFICE O/P EST HI 40 MIN: CPT | Mod: ,,, | Performed by: RADIOLOGY

## 2020-03-09 PROCEDURE — 99215 PR OFFICE/OUTPT VISIT, EST, LEVL V, 40-54 MIN: ICD-10-PCS | Mod: ,,, | Performed by: RADIOLOGY

## 2020-03-09 PROCEDURE — 85025 COMPLETE CBC W/AUTO DIFF WBC: CPT

## 2020-03-09 PROCEDURE — 97802 MEDICAL NUTRITION INDIV IN: CPT

## 2020-03-09 PROCEDURE — 36415 COLL VENOUS BLD VENIPUNCTURE: CPT

## 2020-03-09 PROCEDURE — 63600175 PHARM REV CODE 636 W HCPCS: Performed by: NURSE PRACTITIONER

## 2020-03-09 PROCEDURE — 80048 BASIC METABOLIC PNL TOTAL CA: CPT

## 2020-03-09 PROCEDURE — C9113 INJ PANTOPRAZOLE SODIUM, VIA: HCPCS | Performed by: NURSE PRACTITIONER

## 2020-03-09 PROCEDURE — 96376 TX/PRO/DX INJ SAME DRUG ADON: CPT | Performed by: EMERGENCY MEDICINE

## 2020-03-09 PROCEDURE — 96361 HYDRATE IV INFUSION ADD-ON: CPT | Performed by: EMERGENCY MEDICINE

## 2020-03-09 PROCEDURE — 99213 PR OFFICE/OUTPT VISIT, EST, LEVL III, 20-29 MIN: ICD-10-PCS | Mod: ,,, | Performed by: INTERNAL MEDICINE

## 2020-03-09 PROCEDURE — 99213 OFFICE O/P EST LOW 20 MIN: CPT | Mod: ,,, | Performed by: INTERNAL MEDICINE

## 2020-03-09 RX ORDER — METOPROLOL TARTRATE 25 MG/1
25 TABLET, FILM COATED ORAL 2 TIMES DAILY
Status: DISCONTINUED | OUTPATIENT
Start: 2020-03-09 | End: 2020-03-10 | Stop reason: HOSPADM

## 2020-03-09 RX ORDER — METOPROLOL SUCCINATE 25 MG/1
25 TABLET, EXTENDED RELEASE ORAL DAILY
Status: DISCONTINUED | OUTPATIENT
Start: 2020-03-09 | End: 2020-03-09

## 2020-03-09 RX ADMIN — ONDANSETRON HYDROCHLORIDE 8 MG: 2 SOLUTION INTRAMUSCULAR; INTRAVENOUS at 10:03

## 2020-03-09 RX ADMIN — SODIUM CHLORIDE: 0.9 INJECTION, SOLUTION INTRAVENOUS at 01:03

## 2020-03-09 RX ADMIN — HYDRALAZINE HYDROCHLORIDE 10 MG: 20 INJECTION INTRAMUSCULAR; INTRAVENOUS at 08:03

## 2020-03-09 RX ADMIN — ONDANSETRON HYDROCHLORIDE 8 MG: 2 SOLUTION INTRAMUSCULAR; INTRAVENOUS at 04:03

## 2020-03-09 RX ADMIN — ENOXAPARIN SODIUM 80 MG: 100 INJECTION, SOLUTION INTRAVENOUS; SUBCUTANEOUS at 04:03

## 2020-03-09 RX ADMIN — PROMETHAZINE HYDROCHLORIDE 12.5 MG: 25 INJECTION INTRAMUSCULAR; INTRAVENOUS at 08:03

## 2020-03-09 RX ADMIN — PANTOPRAZOLE SODIUM 40 MG: 40 INJECTION, POWDER, LYOPHILIZED, FOR SOLUTION INTRAVENOUS at 08:03

## 2020-03-09 RX ADMIN — METOPROLOL TARTRATE 25 MG: 25 TABLET ORAL at 10:03

## 2020-03-09 RX ADMIN — METOPROLOL TARTRATE 25 MG: 25 TABLET ORAL at 08:03

## 2020-03-09 NOTE — PLAN OF CARE
Patient continues to be monitored and treated.  Nausea treated with PRN IV zofran.  Pain treated with IV pain medication.   Patient running normal sinus on the monitor.  Patient treated with scheduled IV metoprolol.  Patient received one dose of IV hydralazine for elevated BP reading.  Patient's diet changed to clear liquid from NPO today.  WIll continue to monitor and treat.

## 2020-03-09 NOTE — PLAN OF CARE
Ongoing (interventions implemented as appropriate)  Pt AAO x4.  VSS  Pt able to make needs known.  Pt remained afebrile throughout this shift.   Pt ambulates in room, gait steady   Pt remained free of falls this shift.   Pt denies pain this shift.  Plan of care reviewed. Patient verbalizes understanding.   Pt moving/turing independent. Frequent weight shifting encouraged.  Patient sinus rhythm on monitor.   Bed low, side rails up x 2, wheels locked, call light in reach.   Hourly rounding completed.   Will continue to monitor.

## 2020-03-09 NOTE — PROGRESS NOTES
[facility] -   Hematology/Oncology  Progress Note    Patient Name: Ottoniel Arellano Jr.  Admission Date: 3/6/2020  Hospital Length of Stay: 0 days  Code Status: Full Code     Subjective:     HPI:  79-year-old male history of metastatic hepatocellular carcinoma having failed local therapy.  At this point the patient is admitted to hospital for abdominal pain in control.  Had tried previously MS Contin has not started on methadone.  At this point difficulty keeping solid foods down.  Was called by the emergency room after seeing the patient would recommend admission to the hospital for pain control patient is aware of do not resuscitate orders and has had advanced care planning    Interval History: Duragesic patch in place, patient reports pain level is tolerable 6/10 this AM. Discussed with HM to advance diet to regular due to improvement in nausea.     Oncology Treatment Plan:   OP NIVOLUMAB Q2W    Medications:  Continuous Infusions:  Scheduled Meds:   enoxaparin  1 mg/kg Subcutaneous Q12H    fentaNYL  1 patch Transdermal Q72H    metoprolol tartrate  25 mg Oral BID    pantoprazole  40 mg Intravenous Daily     PRN Meds:artificial tears, hydrALAZINE, morphine, morphine, ondansetron, promethazine (PHENERGAN) IVPB     Review of Systems   Constitutional: Positive for activity change, appetite change and fatigue. Negative for chills, diaphoresis, fever and unexpected weight change.   HENT: Negative for congestion, hearing loss, nosebleeds, postnasal drip, sore throat and trouble swallowing.    Eyes: Negative for discharge and visual disturbance.   Respiratory: Negative for cough, chest tightness and shortness of breath.    Cardiovascular: Negative for chest pain and palpitations.   Gastrointestinal: Positive for abdominal distention, abdominal pain and nausea. Negative for constipation, diarrhea and vomiting.   Endocrine: Negative for cold intolerance and heat intolerance.   Genitourinary: Negative for  difficulty urinating, dysuria, flank pain and hematuria.   Musculoskeletal: Negative for arthralgias, back pain and myalgias.   Skin: Negative.    Neurological: Negative for dizziness, weakness, light-headedness and headaches.   Hematological: Negative for adenopathy. Does not bruise/bleed easily.   Psychiatric/Behavioral: Negative for agitation, behavioral problems and confusion. The patient is nervous/anxious.      Objective:     Vital Signs (Most Recent):  Temp: 98.5 °F (36.9 °C) (03/09/20 1108)  Pulse: 61 (03/09/20 1108)  Resp: 18 (03/09/20 1108)  BP: (!) 142/64 (03/09/20 1108)  SpO2: 95 % (03/09/20 1108) Vital Signs (24h Range):  Temp:  [97.9 °F (36.6 °C)-98.5 °F (36.9 °C)] 98.5 °F (36.9 °C)  Pulse:  [56-79] 61  Resp:  [18] 18  SpO2:  [93 %-98 %] 95 %  BP: (142-200)/(64-91) 142/64     Weight: 80.5 kg (177 lb 7.5 oz)  Body mass index is 25.46 kg/m².  Body surface area is 1.99 meters squared.      Intake/Output Summary (Last 24 hours) at 3/9/2020 1115  Last data filed at 3/9/2020 0500  Gross per 24 hour   Intake 1140 ml   Output 325 ml   Net 815 ml       Physical Exam   Constitutional: He is oriented to person, place, and time. He appears well-developed and well-nourished. No distress.   HENT:   Head: Normocephalic and atraumatic.   Right Ear: Hearing and external ear normal.   Left Ear: Hearing and external ear normal.   Nose: Nose normal. No mucosal edema or rhinorrhea. No epistaxis.   Mouth/Throat: Uvula is midline, oropharynx is clear and moist and mucous membranes are normal.   Eyes: Pupils are equal, round, and reactive to light. Conjunctivae and EOM are normal. Right eye exhibits no chemosis and no discharge. Left eye exhibits no chemosis and no discharge.   Neck: Trachea normal and normal range of motion. Neck supple. No thyroid mass and no thyromegaly present.   Cardiovascular: Normal rate, regular rhythm, normal heart sounds and intact distal pulses.   No murmur heard.  Pulses:       Dorsalis pedis  pulses are 2+ on the right side, and 2+ on the left side.   Pulmonary/Chest: Effort normal and breath sounds normal. No respiratory distress. He has no decreased breath sounds. He has no wheezes.   Abdominal: Soft. He exhibits distension. Bowel sounds are decreased. There is generalized tenderness. There is guarding.   Musculoskeletal: Normal range of motion. He exhibits no edema.   Lymphadenopathy:     He has no cervical adenopathy.     He has no axillary adenopathy.        Right: No supraclavicular adenopathy present.        Left: No supraclavicular adenopathy present.   Neurological: He is alert and oriented to person, place, and time. He has normal strength.   Skin: Skin is warm, dry and intact. Capillary refill takes less than 2 seconds. No rash noted. He is not diaphoretic. There is pallor.   Psychiatric: His speech is normal and behavior is normal. Judgment and thought content normal. His mood appears anxious. Cognition and memory are normal.   Vitals reviewed.      Significant Labs:   CBC:   Recent Labs   Lab 03/08/20  0436 03/09/20  0600   WBC 4.88 4.62   HGB 12.4* 12.5*   HCT 39.1* 39.4*    204    and CMP:   Recent Labs   Lab 03/08/20  0436 03/09/20  0600    136   K 5.0 4.0   CL 99 102   CO2 21* 21*   GLU 68* 73   BUN 7* 8   CREATININE 0.8 0.7   CALCIUM 9.3 9.1   ANIONGAP 16 13   EGFRNONAA >60 >60       Diagnostic Results:  I have reviewed all pertinent imaging results/findings within the past 24 hours.    Assessment/Plan:     Hepatocellular carcinoma  The patient has received 1 dose of immunotherapy with nivolumab 1 week ago.  With increased abdominal discomfort would recommend that patient be started probably on Duragesic 50 mcg patch Q 72 hr.  And allow for control.  Combination of disease portal vein thrombosis as well as retroperitoneal lymphadenopathy.  I have spoken to the family about code status and they agreed to a do not resuscitate order he has had advance care directive planning  placed.  In addition I would recommend that the patient if control is not achieved by 48 hr to consideration of radiation therapy to see whether not any stereotactic radiation could be offered to patient since he does have an area of pinpoint control.  Awaiting immunotherapy benefits at best 30-40% and noncurative setting.  03/08/2020 patient's abdominal discomfort appears to be a little bit less intense this morning but still of concern spoke with he and his wife at bedside Duragesic patches not been placed as of yet I have spoken to Hospital Medicine recommended that Duragesic patch be placed explain the rationale for doing this because of his inability to completely swallow are predictable basis his MS Contin he had not had his methadone filled.  As recommended by palliative care.  Discussed implications with him and hopefully will have radiation therapy evaluation for possible stereotactic radiation if beneficial  3/9/2020: Duragesic patch in place, patient reports improvement in pain level, 6/10 to abdomen. Nausea improved, diet to be advanced today to Regular. Radiation Oncology consulted to determine if able to provide palliative radiation.         Thank you for your consult. I will follow-up with patient. Please contact us if you have any additional questions.     Cammie Dawson NP  Hematology/Oncology  [facility]

## 2020-03-09 NOTE — PROGRESS NOTES
"  [facility] -   Adult Nutrition  Consult Note    SUMMARY     Recommendations     Recommendation: 1) Continue current general diet 2) Follow up with RD  Goals: Meet >85% EEN/EPN by Rd f/u   Nutrition Goal Status: new  Communication of RD recs: Plan of Care    Reason for Assessment    Reason For Assessment: identified at risk by screening criteria  Diagnosis: cancer diagnosis/related complications, nausea and vomiting, constipation  Relevant Medical History: CAD, liver mass  General Information Comments: Pt has unintentionally lost 20lbs, 10% of bodyweight, since December, per Epic records. He reports low intake due to nausea and discomfort. NFPE performed today, patient identified as severely malnourished. Discussed importance of protein-rich meals (as possible) with patient and family. Pt requested a liberalized diet (from clear liquid) which had already been ordered this morning. He was not interested in nutritional supplements at this time.  Nutrition Discharge Planning: General Diet    Nutrition Risk Screen    Nutrition Risk Screen: no indicators present    Nutrition/Diet History    Spiritual, Cultural Beliefs, Pent[employer]al Practices, Values that Affect Care: no    Anthropometrics    Temp: 98.6 °F (37 °C)  Height Method: Stated  Height: 5' 10" (177.8 cm)  Height (inches): 70 in  Weight Method: Bed Scale  Weight: 80.5 kg (177 lb 7.5 oz)  Weight (lb): 177.47 lb  Ideal Body Weight (IBW), Male: 166 lb  BMI (Calculated): 25.5  BMI Grade: 25 - 29.9 - overweight       Lab/Procedures/Meds    Pertinent Labs Reviewed: reviewed  Pertinent Medications Reviewed: reviewed  Lab Results   Component Value Date    CALCIUM 9.1 03/09/2020    PHOS 2.8 02/05/2020     Lab Results   Component Value Date    CREATININE 0.7 03/09/2020    BUN 8 03/09/2020     03/09/2020    K 4.0 03/09/2020     03/09/2020    CO2 21 (L) 03/09/2020       Estimated/Assessed Needs    Weight Used For Calorie Calculations: 80.5 kg (177 lb " 7.5 oz)  Energy Calorie Requirements (kcal): 1609.3- 1755  Energy Need Method: other (see comments)(HBE x 1.1-1.2 )  Protein Requirements: 80-98.8g  Weight Used For Protein Calculations: 80.5 kg (177 lb 7.5 oz)  Fluid Requirements (mL): 1609- 1755mL  Estimated Fluid Requirement Method: RDA Method  RDA Method (mL): 1609.3       Nutrition Prescription Ordered    Current Diet Order: general    Evaluation of Received Nutrient/Fluid Intake        % Intake of Estimated Energy Needs: 25 - 50 %  % Meal Intake: 25 - 50 %    Nutrition Risk  2x weekly     Assessment and Plan    Severe malnutrition    Nutrition Problem:  (Severe) Protein-Calorie Malnutrition  Malnutrition in the context of Chronic Illness/Injury    Related to (etiology):  Inadequate oral intake    Signs and Symptoms (as evidenced by):  Energy Intake: <50% of estimated energy requirement for >5 days  Body Fat Depletion: mild depletion of orbitals   Muscle Mass Depletion: mild depletion of temples and interosseous muscle   Weight Loss: >7.5% x 3 months    Interventions(treatment strategy):  Collaboration with other providers    Nutrition Diagnosis Status:  New      Monitor and Evaluation    Food and Nutrient Intake: food and beverage intake  Food and Nutrient Adminstration: diet order  Biochemical Data, Medical Tests and Procedures: electrolyte and renal panel, glucose/endocrine profile  Nutrition-Focused Physical Findings: extremities, muscles and bones     Malnutrition Assessment            Orbital Region (Subcutaneous Fat Loss): mild depletion  Thoracic and Lumbar Region: mild depletion   Atkinson Region (Muscle Loss): mild depletion  Anterior Thigh Region (Muscle Loss): mild depletion  Posterior Calf Region (Muscle Loss): mild depletion            Nutrition Follow-Up    RD Follow-up?: Yes       Olga WOODARD

## 2020-03-09 NOTE — ASSESSMENT & PLAN NOTE
Nutrition Problem:  (Moderate/Severe) Protein-Calorie Malnutrition  Malnutrition in the context of Chronic Illness/Injury    Related to (etiology):  Inadequate oral intake    Signs and Symptoms (as evidenced by):  Energy Intake: <50% of estimated energy requirement for >5 days  Body Fat Depletion: mild depletion of orbitals   Muscle Mass Depletion: mild depletion of temples and interosseous muscle   Weight Loss: >7.5% x 3 months    Interventions(treatment strategy):  Collaboration with other providers    Nutrition Diagnosis Status:  New

## 2020-03-09 NOTE — SUBJECTIVE & OBJECTIVE
Interval History: Duragesic patch in place, patient reports pain level is tolerable 6/10 this AM. Discussed with HM to advance diet to regular due to improvement in nausea.     Oncology Treatment Plan:   OP NIVOLUMAB Q2W    Medications:  Continuous Infusions:  Scheduled Meds:   enoxaparin  1 mg/kg Subcutaneous Q12H    fentaNYL  1 patch Transdermal Q72H    metoprolol tartrate  25 mg Oral BID    pantoprazole  40 mg Intravenous Daily     PRN Meds:artificial tears, hydrALAZINE, morphine, morphine, ondansetron, promethazine (PHENERGAN) IVPB     Review of Systems   Constitutional: Positive for activity change, appetite change and fatigue. Negative for chills, diaphoresis, fever and unexpected weight change.   HENT: Negative for congestion, hearing loss, nosebleeds, postnasal drip, sore throat and trouble swallowing.    Eyes: Negative for discharge and visual disturbance.   Respiratory: Negative for cough, chest tightness and shortness of breath.    Cardiovascular: Negative for chest pain and palpitations.   Gastrointestinal: Positive for abdominal distention, abdominal pain and nausea. Negative for constipation, diarrhea and vomiting.   Endocrine: Negative for cold intolerance and heat intolerance.   Genitourinary: Negative for difficulty urinating, dysuria, flank pain and hematuria.   Musculoskeletal: Negative for arthralgias, back pain and myalgias.   Skin: Negative.    Neurological: Negative for dizziness, weakness, light-headedness and headaches.   Hematological: Negative for adenopathy. Does not bruise/bleed easily.   Psychiatric/Behavioral: Negative for agitation, behavioral problems and confusion. The patient is nervous/anxious.      Objective:     Vital Signs (Most Recent):  Temp: 98.5 °F (36.9 °C) (03/09/20 1108)  Pulse: 61 (03/09/20 1108)  Resp: 18 (03/09/20 1108)  BP: (!) 142/64 (03/09/20 1108)  SpO2: 95 % (03/09/20 1108) Vital Signs (24h Range):  Temp:  [97.9 °F (36.6 °C)-98.5 °F (36.9 °C)] 98.5 °F (36.9  °C)  Pulse:  [56-79] 61  Resp:  [18] 18  SpO2:  [93 %-98 %] 95 %  BP: (142-200)/(64-91) 142/64     Weight: 80.5 kg (177 lb 7.5 oz)  Body mass index is 25.46 kg/m².  Body surface area is 1.99 meters squared.      Intake/Output Summary (Last 24 hours) at 3/9/2020 1115  Last data filed at 3/9/2020 0500  Gross per 24 hour   Intake 1140 ml   Output 325 ml   Net 815 ml       Physical Exam   Constitutional: He is oriented to person, place, and time. He appears well-developed and well-nourished. No distress.   HENT:   Head: Normocephalic and atraumatic.   Right Ear: Hearing and external ear normal.   Left Ear: Hearing and external ear normal.   Nose: Nose normal. No mucosal edema or rhinorrhea. No epistaxis.   Mouth/Throat: Uvula is midline, oropharynx is clear and moist and mucous membranes are normal.   Eyes: Pupils are equal, round, and reactive to light. Conjunctivae and EOM are normal. Right eye exhibits no chemosis and no discharge. Left eye exhibits no chemosis and no discharge.   Neck: Trachea normal and normal range of motion. Neck supple. No thyroid mass and no thyromegaly present.   Cardiovascular: Normal rate, regular rhythm, normal heart sounds and intact distal pulses.   No murmur heard.  Pulses:       Dorsalis pedis pulses are 2+ on the right side, and 2+ on the left side.   Pulmonary/Chest: Effort normal and breath sounds normal. No respiratory distress. He has no decreased breath sounds. He has no wheezes.   Abdominal: Soft. He exhibits distension. Bowel sounds are decreased. There is generalized tenderness. There is guarding.   Musculoskeletal: Normal range of motion. He exhibits no edema.   Lymphadenopathy:     He has no cervical adenopathy.     He has no axillary adenopathy.        Right: No supraclavicular adenopathy present.        Left: No supraclavicular adenopathy present.   Neurological: He is alert and oriented to person, place, and time. He has normal strength.   Skin: Skin is warm, dry and  intact. Capillary refill takes less than 2 seconds. No rash noted. He is not diaphoretic. There is pallor.   Psychiatric: His speech is normal and behavior is normal. Judgment and thought content normal. His mood appears anxious. Cognition and memory are normal.   Vitals reviewed.      Significant Labs:   CBC:   Recent Labs   Lab 03/08/20  0436 03/09/20  0600   WBC 4.88 4.62   HGB 12.4* 12.5*   HCT 39.1* 39.4*    204    and CMP:   Recent Labs   Lab 03/08/20  0436 03/09/20  0600    136   K 5.0 4.0   CL 99 102   CO2 21* 21*   GLU 68* 73   BUN 7* 8   CREATININE 0.8 0.7   CALCIUM 9.3 9.1   ANIONGAP 16 13   EGFRNONAA >60 >60       Diagnostic Results:  I have reviewed all pertinent imaging results/findings within the past 24 hours.

## 2020-03-09 NOTE — CONSULTS
[facility]  RADIATION ONCOLOGY INPATIENT CONSULTATION    Name: Ottoniel Arellano Jr.  : 1940      Patient Referred To Radiation Oncology By:  Dr. Kevin    DIAGNOSIS: hepatocellular carcinoma with retroperitoneal lymph nodes, stage IV    HISTORY OF PRESENT ILLNESS:  Ottoniel Arellano Jr. is a 79 y.o. male who is seen as an inpatient for the above diagnosis. He was seen in outpatient consultation last week.  He was recently admitted to the hospital for worsening abdominal pain. He had tried MS Contin and was considered to start on methadone. Considering starting Fentanyl patch soon.  He received first cycle of nivolumab last week.  Today, he has continued abdominal pain that is improved with Fentanyl patch. He was able to eat some lunch today. Still has nausea.    REVIEW OF SYSTEMS: (Positive findings bold, otherwise negative)   Constitutional: fever, fatigue, weight change  Eyes: blurred vision in the past 3 months, double vision   ENT: ear pain, new mouth lesions, jaw pain, difficulty swallowing, sore throat  Cardiovascular: chest pain on exertion, reflux, leg swelling  Respiratory: shortness of breath, dyspnea, cough, hemoptysis.   GI: abdominal pain, diarrhea, constipation, blood in stool, painful bowel movements  : painful or burning urination, blood in urine  Musculoskeletal: new bone or joint pains  Neurologic: headache, seizure, focal numbness or tingling, balance changes, speech changes  Lymph: new or enlarged lymph nodes  Psychiatric: depression, anxiety    PRIOR RADIATION HISTORY: Y90 radioembolization 20    PAST MEDICAL HISTORY:  Past Medical History:   Diagnosis Date    Arthritis     Coronary artery disease     HCC (hepatocellular carcinoma)     Liver mass        PAST SURGICAL HISTORY:  Past Surgical History:   Procedure Laterality Date    ANGIOGRAM, CORONARY, WITH LEFT HEART CATHETERIZATION      stents 2012    EYE SURGERY      right, skin graft    FLUOROSCOPY  N/A 1/21/2020    Procedure: Y90 Mapping;  Surgeon: Ar Ham MD;  Location: [facility] CATH LAB;  Service: General;  Laterality: N/A;    FLUOROSCOPY N/A 2/4/2020    Procedure: TheraSphere Liver Mass;  Surgeon: Ar Ham MD;  Location: [facility] CATH LAB;  Service: General;  Laterality: N/A;    SPINAL FUSION      ACDF 05/04/2010    TONSILLECTOMY         ALLERGIES:   Review of patient's allergies indicates:   Allergen Reactions    Penicillins Hives and Rash    Shellfish containing products      Pt states face and hands turn blotchy red and itch       MEDICATIONS:    Current Facility-Administered Medications:     artificial tears 0.5 % ophthalmic solution 1 drop, 1 drop, Both Eyes, QID PRN, Mejia Díaz NP    enoxaparin injection 80 mg, 1 mg/kg, Subcutaneous, Q12H, Gay Osorio NP, 80 mg at 03/08/20 2224    fentaNYL 50 mcg/hr 1 patch, 1 patch, Transdermal, Q72H, Ko Maldonado MD, 1 patch at 03/08/20 1021    hydrALAZINE injection 10 mg, 10 mg, Intravenous, Q8H PRN, Gay Osorio NP, 10 mg at 03/08/20 1943    metoprolol tartrate (LOPRESSOR) tablet 25 mg, 25 mg, Oral, BID, Ko Maldonado MD    morphine injection 2 mg, 2 mg, Intravenous, Q4H PRN, Ko Maldonado MD    morphine injection 4 mg, 4 mg, Intravenous, Q4H PRN, Gay Osorio NP, 4 mg at 03/08/20 0826    ondansetron injection 8 mg, 8 mg, Intravenous, Q8H PRN, Gay Osorio NP, 8 mg at 03/08/20 2228    pantoprazole injection 40 mg, 40 mg, Intravenous, Daily, Gay Osorio NP, 40 mg at 03/08/20 0826    promethazine (PHENERGAN) 12.5 mg in dextrose 5 % 50 mL IVPB, 12.5 mg, Intravenous, Q6H PRN, Gay Osorio NP, Last Rate: 150 mL/hr at 03/07/20 2133, 12.5 mg at 03/07/20 2133    SOCIAL HISTORY:  Social History     Socioeconomic History    Marital status:      Spouse name: Not on file    Number of children: Not on file    Years of education: Not on file    Highest education level:  "Not on file   Occupational History    Not on file   Social Needs    Financial resource strain: Not on file    Food insecurity:     Worry: Not on file     Inability: Not on file    Transportation needs:     Medical: Not on file     Non-medical: Not on file   Tobacco Use    Smoking status: Former Smoker    Smokeless tobacco: Former User     Quit date: 5/18/1986   Substance and Sexual Activity    Alcohol use: No    Drug use: Not on file    Sexual activity: Not on file   Lifestyle    Physical activity:     Days per week: Not on file     Minutes per session: Not on file    Stress: Not on file   Relationships    Social connections:     Talks on phone: Not on file     Gets together: Not on file     Attends Gnosticism service: Not on file     Active member of club or organization: Not on file     Attends meetings of clubs or organizations: Not on file     Relationship status: Not on file   Other Topics Concern    Not on file   Social History Narrative    Not on file     L[place] in [facility]    FAMILY HISTORY:  History reviewed. No pertinent family history.    PHYSICAL EXAMINATION:  Constitutional: well appearing, no acute distress, ECOG 2  Vitals:    BP (!) 172/75   Pulse (!) 56   Temp 98.5 °F (36.9 °C)   Resp 18   Ht 5' 10" (1.778 m)   Wt 80.5 kg (177 lb 7.5 oz)   SpO2 95%   BMI 25.46 kg/m²   Eyes: sclera anicteric, EOMI, pupils equal, round and reactive to light  ENT: oral cavity without lesions, moist mucous membranes  Neck: trachea midline, neck supple  Lymphatic: no cervical, supraclavicular or axillary adenopathy  Cardiovascular: regular rate, no murmurs, no edema of the upper or lower extremities, radial pulse 2+  Respiratory: unlabored effort, clear to auscultation, no wheezes  Abdomen: soft, non-tender, no rigidity, no masses, no hepatomegaly    IMAGING AND LABORATORY FINDINGS: As per HPI; images reviewed personally.    ASSESSMENT: 79 y.o. male with HCC now with portal kvng mass and small " retroperitoneal lymph node, recently admitted for abdominal pain    PLAN: Mr. Arellano was admitted for abdominal pain and the idea of radiation was again considered. I would still not recommend palliative radiation due to recent Y90 and also limited potential benefit from areas to consider treating. There has been no interval change on CT abdomen/pelvis. Radiation could result in increased GI toxicity especially given his recent Y90 procedure. The retroperitoneal node is small and not causing any problems at this time and the periportal nodes are too close in proximity to treated liver. I would favor monitor his response on nivolumab and reserve palliative radiation for symptomatic progression. Continue pain management and nausea control per primary team. Consider palliative care consult.    We discussed the techniques, toxicities and indications of radiation and I answered the patient's questions to their apparent satisfaction.      Ruben Murray III, M.D.  Radiation Oncology  [facility] [address] [place] [address]  Ph: [phone]  [email or web address]

## 2020-03-09 NOTE — ASSESSMENT & PLAN NOTE
The patient has received 1 dose of immunotherapy with nivolumab 1 week ago.  With increased abdominal discomfort would recommend that patient be started probably on Duragesic 50 mcg patch Q 72 hr.  And allow for control.  Combination of disease portal vein thrombosis as well as retroperitoneal lymphadenopathy.  I have spoken to the family about code status and they agreed to a do not resuscitate order he has had advance care directive planning placed.  In addition I would recommend that the patient if control is not achieved by 48 hr to consideration of radiation therapy to see whether not any stereotactic radiation could be offered to patient since he does have an area of pinpoint control.  Awaiting immunotherapy benefits at best 30-40% and noncurative setting.  03/08/2020 patient's abdominal discomfort appears to be a little bit less intense this morning but still of concern spoke with he and his wife at bedside Duragesic patches not been placed as of yet I have spoken to Hospital Medicine recommended that Duragesic patch be placed explain the rationale for doing this because of his inability to completely swallow are predictable basis his MS Contin he had not had his methadone filled.  As recommended by palliative care.  Discussed implications with him and hopefully will have radiation therapy evaluation for possible stereotactic radiation if beneficial  3/9/2020: Duragesic patch in place, patient reports improvement in pain level, 6/10 to abdomen. Nausea improved, diet to be advanced today to Regular. Radiation Oncology consulted to determine if able to provide palliative radiation.

## 2020-03-09 NOTE — PLAN OF CARE
POC reviewed w/ pt & family at bedside; questions encouraged & answered.   AAOx4- able to verbalize understanding, needs & follow commands.   Independent w/ ADLs; repositions self in bed.  Blanchable redness noted to coccyx; educated on importance of changing position often; verbalized understanding.  PIV to RAC; NaCl 0.9% @ 100mL/hr infusing.   Meds tolerated.   Denies pain.   Nausea alleviated w/ IV zofran.  Sinus rhythm on monitor.   Condition stable @ this time.  Safety - Hourly rounding complete. Free of falls. All safety measures in place.   Will continue to monitor throughout shift.

## 2020-03-09 NOTE — PLAN OF CARE
03/09/20 1216   Discharge Assessment   Assessment Type Discharge Planning Assessment   Confirmed/corrected address and phone number on facesheet? Yes   Assessment information obtained from? Patient   Prior to hospitilization cognitive status: Alert/Oriented   Prior to hospitalization functional status: Independent   Current cognitive status: Alert/Oriented   Current Functional Status: Independent   Lives With spouse   Able to Return to Prior Arrangements yes   Is patient able to care for self after discharge? Yes   Who are your caregiver(s) and their phone number(s)? Maddy Arellano (wife) [phone]   Patient's perception of discharge disposition home health   Readmission Within the Last 30 Days no previous admission in last 30 days   Patient currently being followed by outpatient case management? No   Patient currently receives any other outside agency services? No   Equipment Currently Used at Home walker, standard   Do you have any problems affording any of your prescribed medications? No   Is the patient taking medications as prescribed? yes   Does the patient have transportation home? Yes   Transportation Anticipated family or friend will provide   Does the patient receive services at the [facility]? No   Discharge Plan A Home Health   DME Needed Upon Discharge  none   Patient/Family in Agreement with Plan yes     Met with pt and family at bedside for DC assessment. Pt lives at home with his wife and occasionally uses a RW for ambulation. Pt is current with [facility]  and can resume on DC. No other CM DC needs identified at this time. SWer provided a transitional care folder, information on advanced directives, information on pharmacy bedside delivery, and discharge planning begins on admission with contact information for any needs/questions. Pt opted for bedside medication delivery. His typical pharmacy is [facility]. Information below.      [facility] - [place], [place] - [address]  [place] [address]  Phone: [phone] Fax: [phone]    [id number] - [place], [place] - [address] [address]  [place] [address]  Phone: [phone] Fax: [phone]    PCP: MD Oscar Feliz LMSW 3/9/2020 12:23 PM

## 2020-03-10 VITALS
WEIGHT: 176.81 LBS | RESPIRATION RATE: 20 BRPM | HEART RATE: 63 BPM | OXYGEN SATURATION: 98 % | DIASTOLIC BLOOD PRESSURE: 77 MMHG | HEIGHT: 70 IN | BODY MASS INDEX: 25.31 KG/M2 | TEMPERATURE: 98 F | SYSTOLIC BLOOD PRESSURE: 160 MMHG

## 2020-03-10 DIAGNOSIS — C22.0 HEPATOCELLULAR CARCINOMA: Primary | ICD-10-CM

## 2020-03-10 LAB
ANION GAP SERPL CALC-SCNC: 9 MMOL/L (ref 8–16)
BASOPHILS # BLD AUTO: 0.03 K/UL (ref 0–0.2)
BASOPHILS NFR BLD: 0.7 % (ref 0–1.9)
BUN SERPL-MCNC: 9 MG/DL (ref 8–23)
CALCIUM SERPL-MCNC: 9.1 MG/DL (ref 8.7–10.5)
CHLORIDE SERPL-SCNC: 102 MMOL/L (ref 95–110)
CO2 SERPL-SCNC: 26 MMOL/L (ref 23–29)
CREAT SERPL-MCNC: 0.8 MG/DL (ref 0.5–1.4)
DIFFERENTIAL METHOD: ABNORMAL
EOSINOPHIL # BLD AUTO: 0.1 K/UL (ref 0–0.5)
EOSINOPHIL NFR BLD: 1.9 % (ref 0–8)
ERYTHROCYTE [DISTWIDTH] IN BLOOD BY AUTOMATED COUNT: 17.2 % (ref 11.5–14.5)
EST. GFR  (AFRICAN AMERICAN): >60 ML/MIN/1.73 M^2
EST. GFR  (NON AFRICAN AMERICAN): >60 ML/MIN/1.73 M^2
GLUCOSE SERPL-MCNC: 79 MG/DL (ref 70–110)
HCT VFR BLD AUTO: 37.1 % (ref 40–54)
HGB BLD-MCNC: 12.1 G/DL (ref 14–18)
IMM GRANULOCYTES # BLD AUTO: 0.01 K/UL (ref 0–0.04)
IMM GRANULOCYTES NFR BLD AUTO: 0.2 % (ref 0–0.5)
LYMPHOCYTES # BLD AUTO: 0.7 K/UL (ref 1–4.8)
LYMPHOCYTES NFR BLD: 17.3 % (ref 18–48)
MCH RBC QN AUTO: 28.1 PG (ref 27–31)
MCHC RBC AUTO-ENTMCNC: 32.6 G/DL (ref 32–36)
MCV RBC AUTO: 86 FL (ref 82–98)
MONOCYTES # BLD AUTO: 0.6 K/UL (ref 0.3–1)
MONOCYTES NFR BLD: 13.3 % (ref 4–15)
NEUTROPHILS # BLD AUTO: 2.8 K/UL (ref 1.8–7.7)
NEUTROPHILS NFR BLD: 66.6 % (ref 38–73)
NRBC BLD-RTO: 0 /100 WBC
PLATELET # BLD AUTO: 190 K/UL (ref 150–350)
PMV BLD AUTO: 10.8 FL (ref 9.2–12.9)
POTASSIUM SERPL-SCNC: 4.2 MMOL/L (ref 3.5–5.1)
RBC # BLD AUTO: 4.3 M/UL (ref 4.6–6.2)
SODIUM SERPL-SCNC: 137 MMOL/L (ref 136–145)
WBC # BLD AUTO: 4.21 K/UL (ref 3.9–12.7)

## 2020-03-10 PROCEDURE — 85025 COMPLETE CBC W/AUTO DIFF WBC: CPT

## 2020-03-10 PROCEDURE — C9113 INJ PANTOPRAZOLE SODIUM, VIA: HCPCS | Performed by: NURSE PRACTITIONER

## 2020-03-10 PROCEDURE — 80048 BASIC METABOLIC PNL TOTAL CA: CPT

## 2020-03-10 PROCEDURE — G0009 ADMIN PNEUMOCOCCAL VACCINE: HCPCS | Performed by: INTERNAL MEDICINE

## 2020-03-10 PROCEDURE — 90670 PCV13 VACCINE IM: CPT | Performed by: INTERNAL MEDICINE

## 2020-03-10 PROCEDURE — 25000003 PHARM REV CODE 250: Performed by: INTERNAL MEDICINE

## 2020-03-10 PROCEDURE — 63600175 PHARM REV CODE 636 W HCPCS: Performed by: NURSE PRACTITIONER

## 2020-03-10 PROCEDURE — 90471 IMMUNIZATION ADMIN: CPT | Performed by: INTERNAL MEDICINE

## 2020-03-10 PROCEDURE — 99232 PR SUBSEQUENT HOSPITAL CARE,LEVL II: ICD-10-PCS | Mod: ,,, | Performed by: INTERNAL MEDICINE

## 2020-03-10 PROCEDURE — 36415 COLL VENOUS BLD VENIPUNCTURE: CPT

## 2020-03-10 PROCEDURE — 63600175 PHARM REV CODE 636 W HCPCS: Performed by: INTERNAL MEDICINE

## 2020-03-10 PROCEDURE — 99232 SBSQ HOSP IP/OBS MODERATE 35: CPT | Mod: ,,, | Performed by: INTERNAL MEDICINE

## 2020-03-10 RX ORDER — FENTANYL 50 UG/1
1 PATCH TRANSDERMAL
Status: DISCONTINUED | OUTPATIENT
Start: 2020-03-10 | End: 2020-03-10 | Stop reason: HOSPADM

## 2020-03-10 RX ORDER — FENTANYL 50 UG/1
1 PATCH TRANSDERMAL
Qty: 5 PATCH | Refills: 0 | Status: SHIPPED | OUTPATIENT
Start: 2020-03-10 | End: 2020-03-10 | Stop reason: SDUPTHER

## 2020-03-10 RX ORDER — ONDANSETRON 4 MG/1
4 TABLET, FILM COATED ORAL EVERY 6 HOURS PRN
Qty: 30 TABLET | Refills: 1 | Status: SHIPPED | OUTPATIENT
Start: 2020-03-10 | End: 2020-03-17

## 2020-03-10 RX ORDER — FENTANYL 50 UG/1
1 PATCH TRANSDERMAL
Qty: 5 PATCH | Refills: 0 | Status: SHIPPED | OUTPATIENT
Start: 2020-03-10 | End: 2020-03-17 | Stop reason: SDUPTHER

## 2020-03-10 RX ADMIN — MORPHINE SULFATE 4 MG: 4 INJECTION, SOLUTION INTRAMUSCULAR; INTRAVENOUS at 12:03

## 2020-03-10 RX ADMIN — PNEUMOCOCCAL 13-VALENT CONJUGATE VACCINE 0.5 ML: 2.2; 2.2; 2.2; 2.2; 2.2; 4.4; 2.2; 2.2; 2.2; 2.2; 2.2; 2.2; 2.2 INJECTION, SUSPENSION INTRAMUSCULAR at 04:03

## 2020-03-10 RX ADMIN — METOPROLOL TARTRATE 25 MG: 25 TABLET ORAL at 10:03

## 2020-03-10 RX ADMIN — ENOXAPARIN SODIUM 80 MG: 100 INJECTION, SOLUTION INTRAVENOUS; SUBCUTANEOUS at 10:03

## 2020-03-10 RX ADMIN — ONDANSETRON HYDROCHLORIDE 8 MG: 2 SOLUTION INTRAMUSCULAR; INTRAVENOUS at 07:03

## 2020-03-10 RX ADMIN — FENTANYL 1 PATCH: 50 PATCH, EXTENDED RELEASE TRANSDERMAL at 06:03

## 2020-03-10 RX ADMIN — PANTOPRAZOLE SODIUM 40 MG: 40 INJECTION, POWDER, LYOPHILIZED, FOR SOLUTION INTRAVENOUS at 10:03

## 2020-03-10 NOTE — PROGRESS NOTES
Discharge instructions given to patient and wife, verbalized understanding - IV removed without difficulty, pressure dressing applied to site - cardiac monitor removed and returned to monitor tech - waiting for paper script for Fentanyl patches.

## 2020-03-10 NOTE — PLAN OF CARE
CM met with patient/family at the bedside to discuss the discharge plan.  Patient is current with [facility] for nursing only and would like services resumed.  Choice form completed and placed in the patient blue folder.  Referral made via [facility].  Patient denies any other CM discharge needs at this time.       03/10/20 1143   Discharge Reassessment   Assessment Type Discharge Planning Reassessment   Provided patient/caregiver education on the expected discharge date and the discharge plan Yes   Do you have any problems affording any of your prescribed medications? No   Discharge Plan A Home with family;Home Health   DME Needed Upon Discharge  none   Patient choice form signed by patient/caregiver Yes   Anticipated Discharge Disposition Home-Health   Can the patient/caregiver answer the patient profile reliably? Yes, cognitively intact

## 2020-03-10 NOTE — ASSESSMENT & PLAN NOTE
The patient has received 1 dose of immunotherapy with nivolumab 1 week ago.  With increased abdominal discomfort would recommend that patient be started probably on Duragesic 50 mcg patch Q 72 hr.  And allow for control.  Combination of disease portal vein thrombosis as well as retroperitoneal lymphadenopathy.  I have spoken to the family about code status and they agreed to a do not resuscitate order he has had advance care directive planning placed.  In addition I would recommend that the patient if control is not achieved by 48 hr to consideration of radiation therapy to see whether not any stereotactic radiation could be offered to patient since he does have an area of pinpoint control.  Awaiting immunotherapy benefits at best 30-40% and noncurative setting.  03/08/2020 patient's abdominal discomfort appears to be a little bit less intense this morning but still of concern spoke with he and his wife at bedside Duragesic patches not been placed as of yet I have spoken to Hospital Medicine recommended that Duragesic patch be placed explain the rationale for doing this because of his inability to completely swallow are predictable basis his MS Contin he had not had his methadone filled.  As recommended by palliative care.  Discussed implications with him and hopefully will have radiation therapy evaluation for possible stereotactic radiation if beneficial  3/9/2020: Duragesic patch in place, patient reports improvement in pain level, 6/10 to abdomen. Nausea improved, diet to be advanced today to Regular. Radiation Oncology consulted to determine if able to provide palliative radiation.     3/10/2020: Patient has been using Duragesic patch since Sunday and states it has significantly improved his pain level. He is tolerating a regular diet at this time. Still complains of nausea but is managed with nausea medications. Will place orders to ensure patient has adequate supply of nausea medication at home. Orders placed  for Duragesic patches to be picked up from pharmacy.  Patient and spouse educated on use of home medications, patient will use the Duragesic patches and Roxicodone for breakthrough pain as ordered. He does have a home supply of methadone, he has not started taking this medication yet, he will continue to hold the methadone at this time. If his pain needs begins to increase he knows to call the clinic to discuss further management prior to changing the use of home medications including Methadone. Spouse in agreement with plan.

## 2020-03-10 NOTE — SUBJECTIVE & OBJECTIVE
Interval History:  Continues to have nausea partially controlled but improved.  Appetite returning.  Intermittent abdominal pain better controlled.  Tolerating clear liquids advance.    Review of Systems   Constitutional: Negative.  Negative for chills and fever.   HENT: Negative.  Negative for congestion and sore throat.    Eyes: Negative.  Negative for visual disturbance.   Respiratory: Negative.  Negative for cough, shortness of breath and wheezing.    Cardiovascular: Negative.  Negative for chest pain.   Gastrointestinal: Positive for abdominal pain and nausea. Negative for diarrhea and vomiting.   Endocrine: Negative.    Genitourinary: Negative.    Musculoskeletal: Negative.  Negative for myalgias and neck stiffness.   Skin: Negative.  Negative for color change and pallor.   Allergic/Immunologic: Negative.    Neurological: Negative.    Hematological: Negative.    Psychiatric/Behavioral: Negative.    All other systems reviewed and are negative.    Objective:     Vital Signs (Most Recent):  Temp: 98.6 °F (37 °C) (03/09/20 1510)  Pulse: 69 (03/09/20 1648)  Resp: 18 (03/09/20 1510)  BP: (!) 167/76 (03/09/20 1510)  SpO2: 96 % (03/09/20 1510) Vital Signs (24h Range):  Temp:  [98.3 °F (36.8 °C)-98.6 °F (37 °C)] 98.6 °F (37 °C)  Pulse:  [56-72] 69  Resp:  [18] 18  SpO2:  [93 %-98 %] 96 %  BP: (142-182)/(64-79) 167/76     Weight: 80.5 kg (177 lb 7.5 oz)  Body mass index is 25.46 kg/m².    Intake/Output Summary (Last 24 hours) at 3/9/2020 1927  Last data filed at 3/9/2020 1600  Gross per 24 hour   Intake 2220 ml   Output 525 ml   Net 1695 ml      Physical Exam   Constitutional: He is oriented to person, place, and time. He appears well-developed and well-nourished. No distress.   HENT:   Head: Normocephalic and atraumatic.   Mouth/Throat: Oropharynx is clear and moist.   Eyes: Pupils are equal, round, and reactive to light. Conjunctivae and EOM are normal.   Neck: No JVD present. No thyromegaly present.   Cardiovascular:  Normal rate, regular rhythm and normal heart sounds. Exam reveals no gallop and no friction rub.   No murmur heard.  Pulmonary/Chest: Effort normal and breath sounds normal. No respiratory distress. He has no wheezes. He has no rales.   Abdominal: Soft. Bowel sounds are normal. He exhibits no distension. There is tenderness. There is no rebound and no guarding.   Musculoskeletal: Normal range of motion. He exhibits no edema or tenderness.   Lymphadenopathy:     He has no cervical adenopathy.   Neurological: He is alert and oriented to person, place, and time. He has normal reflexes. He displays normal reflexes. No cranial nerve deficit.   Skin: Skin is warm and dry. No rash noted. He is not diaphoretic. No erythema.   Psychiatric: He has a normal mood and affect. His behavior is normal. Judgment and thought content normal.       Significant Labs: All pertinent labs within the past 24 hours have been reviewed.    Significant Imaging: I have reviewed all pertinent imaging results/findings within the past 24 hours.

## 2020-03-10 NOTE — SUBJECTIVE & OBJECTIVE
Interval History: patient tolerating regular diet, discussed consuming small amounts of food at a time several times a day. The addition of Duragesic patch is providing adequate pain control. Patient does have a prescription for methadone at home, he will take this at this time but will continue the duragesic patch with Roxicodone for breakthrough pain at home. Discussed using nausea medication on schedule to control nausea. Educated patient and spouse on use of nausea medication and pain medication at home. Patient knows to call clinic if medications are not providing adequate relief at home. Will schedule patient to be seen outpatient later this week.     Oncology Treatment Plan:   OP NIVOLUMAB Q2W    Medications:  Continuous Infusions:  Scheduled Meds:   enoxaparin  1 mg/kg Subcutaneous Q12H    fentaNYL  1 patch Transdermal Q72H    metoprolol tartrate  25 mg Oral BID    pantoprazole  40 mg Intravenous Daily     PRN Meds:artificial tears, hydrALAZINE, morphine, morphine, ondansetron, pneumoc 13-alex conj-dip cr(PF), promethazine (PHENERGAN) IVPB     Review of Systems   Constitutional: Positive for activity change, appetite change and fatigue. Negative for chills, diaphoresis, fever and unexpected weight change.   HENT: Negative for congestion, hearing loss, nosebleeds, postnasal drip, sore throat and trouble swallowing.    Eyes: Negative for discharge and visual disturbance.   Respiratory: Negative for shortness of breath.    Cardiovascular: Negative for chest pain and palpitations.   Gastrointestinal: Positive for abdominal pain and nausea. Negative for abdominal distention, anal bleeding, constipation, diarrhea and vomiting.   Endocrine: Negative for cold intolerance and heat intolerance.   Genitourinary: Negative for difficulty urinating, dysuria and hematuria.   Musculoskeletal: Positive for arthralgias and back pain. Negative for gait problem and myalgias.   Skin: Negative.    Neurological: Negative for  dizziness, weakness, light-headedness and headaches.   Hematological: Negative for adenopathy. Does not bruise/bleed easily.   Psychiatric/Behavioral: Negative for agitation, behavioral problems and confusion. The patient is nervous/anxious.      Objective:     Vital Signs (Most Recent):  Temp: 97.6 °F (36.4 °C) (03/10/20 0748)  Pulse: 69 (03/10/20 0748)  Resp: 20 (03/10/20 0748)  BP: (!) 174/79 (03/10/20 0748)  SpO2: 97 % (03/10/20 0748) Vital Signs (24h Range):  Temp:  [96.6 °F (35.9 °C)-98.6 °F (37 °C)] 97.6 °F (36.4 °C)  Pulse:  [53-70] 69  Resp:  [18-20] 20  SpO2:  [95 %-97 %] 97 %  BP: (142-175)/(64-79) 174/79     Weight: 80.2 kg (176 lb 12.9 oz)  Body mass index is 25.37 kg/m².  Body surface area is 1.99 meters squared.      Intake/Output Summary (Last 24 hours) at 3/10/2020 1032  Last data filed at 3/10/2020 0413  Gross per 24 hour   Intake 1080 ml   Output 300 ml   Net 780 ml       Physical Exam   Constitutional: He is oriented to person, place, and time. He appears well-developed and well-nourished. No distress.   HENT:   Head: Normocephalic and atraumatic.   Right Ear: Hearing and external ear normal.   Left Ear: Hearing and external ear normal.   Nose: Nose normal. No mucosal edema or rhinorrhea. No epistaxis.   Mouth/Throat: Uvula is midline, oropharynx is clear and moist and mucous membranes are normal.   Eyes: Pupils are equal, round, and reactive to light. Conjunctivae and EOM are normal. Right eye exhibits no chemosis and no discharge. Left eye exhibits no chemosis and no discharge.   Neck: Trachea normal and normal range of motion. Neck supple. No thyroid mass and no thyromegaly present.   Cardiovascular: Normal rate, regular rhythm, normal heart sounds and intact distal pulses.   No murmur heard.  Pulses:       Dorsalis pedis pulses are 2+ on the right side, and 2+ on the left side.   Pulmonary/Chest: Effort normal and breath sounds normal. No respiratory distress. He has no decreased breath  sounds. He has no wheezes.   Abdominal: Soft. He exhibits distension. Bowel sounds are decreased. There is generalized tenderness.   Musculoskeletal: Normal range of motion. He exhibits no edema.   Lymphadenopathy:     He has no cervical adenopathy.     He has no axillary adenopathy.        Right: No supraclavicular adenopathy present.        Left: No supraclavicular adenopathy present.   Neurological: He is alert and oriented to person, place, and time. He has normal strength.   Skin: Skin is warm, dry and intact. Capillary refill takes less than 2 seconds. No rash noted. He is not diaphoretic. There is pallor.   Psychiatric: His speech is normal and behavior is normal. Judgment and thought content normal. His mood appears anxious. Cognition and memory are normal.   Nursing note and vitals reviewed.      Significant Labs:   CBC:   Recent Labs   Lab 03/09/20  0600 03/10/20  0444   WBC 4.62 4.21   HGB 12.5* 12.1*   HCT 39.4* 37.1*    190    and CMP:   Recent Labs   Lab 03/09/20  0600 03/10/20  0444    137   K 4.0 4.2    102   CO2 21* 26   GLU 73 79   BUN 8 9   CREATININE 0.7 0.8   CALCIUM 9.1 9.1   ANIONGAP 13 9   EGFRNONAA >60 >60       Diagnostic Results:  I have reviewed all pertinent imaging results/findings within the past 24 hours.

## 2020-03-10 NOTE — PROGRESS NOTES
[facility] -   Hematology/Oncology  Progress Note    Patient Name: Ottoniel Arellano Jr.  Admission Date: 3/6/2020  Hospital Length of Stay: 1 days  Code Status: Full Code     Subjective:     HPI:  79-year-old male history of metastatic hepatocellular carcinoma having failed local therapy.  At this point the patient is admitted to hospital for abdominal pain in control.  Had tried previously MS Contin has not started on methadone.  At this point difficulty keeping solid foods down.  Was called by the emergency room after seeing the patient would recommend admission to the hospital for pain control patient is aware of do not resuscitate orders and has had advanced care planning    Interval History: patient tolerating regular diet, discussed consuming small amounts of food at a time several times a day. The addition of Duragesic patch is providing adequate pain control. Patient does have a prescription for methadone at home, he will take this at this time but will continue the duragesic patch with Roxicodone for breakthrough pain at home. Discussed using nausea medication on schedule to control nausea. Educated patient and spouse on use of nausea medication and pain medication at home. Patient knows to call clinic if medications are not providing adequate relief at home. Will schedule patient to be seen outpatient later this week.     Oncology Treatment Plan:   OP NIVOLUMAB Q2W    Medications:  Continuous Infusions:  Scheduled Meds:   enoxaparin  1 mg/kg Subcutaneous Q12H    fentaNYL  1 patch Transdermal Q72H    metoprolol tartrate  25 mg Oral BID    pantoprazole  40 mg Intravenous Daily     PRN Meds:artificial tears, hydrALAZINE, morphine, morphine, ondansetron, pneumoc 13-alex conj-dip cr(PF), promethazine (PHENERGAN) IVPB     Review of Systems   Constitutional: Positive for activity change, appetite change and fatigue. Negative for chills, diaphoresis, fever and unexpected weight change.   HENT:  Negative for congestion, hearing loss, nosebleeds, postnasal drip, sore throat and trouble swallowing.    Eyes: Negative for discharge and visual disturbance.   Respiratory: Negative for shortness of breath.    Cardiovascular: Negative for chest pain and palpitations.   Gastrointestinal: Positive for abdominal pain and nausea. Negative for abdominal distention, anal bleeding, constipation, diarrhea and vomiting.   Endocrine: Negative for cold intolerance and heat intolerance.   Genitourinary: Negative for difficulty urinating, dysuria and hematuria.   Musculoskeletal: Positive for arthralgias and back pain. Negative for gait problem and myalgias.   Skin: Negative.    Neurological: Negative for dizziness, weakness, light-headedness and headaches.   Hematological: Negative for adenopathy. Does not bruise/bleed easily.   Psychiatric/Behavioral: Negative for agitation, behavioral problems and confusion. The patient is nervous/anxious.      Objective:     Vital Signs (Most Recent):  Temp: 97.6 °F (36.4 °C) (03/10/20 0748)  Pulse: 69 (03/10/20 0748)  Resp: 20 (03/10/20 0748)  BP: (!) 174/79 (03/10/20 0748)  SpO2: 97 % (03/10/20 0748) Vital Signs (24h Range):  Temp:  [96.6 °F (35.9 °C)-98.6 °F (37 °C)] 97.6 °F (36.4 °C)  Pulse:  [53-70] 69  Resp:  [18-20] 20  SpO2:  [95 %-97 %] 97 %  BP: (142-175)/(64-79) 174/79     Weight: 80.2 kg (176 lb 12.9 oz)  Body mass index is 25.37 kg/m².  Body surface area is 1.99 meters squared.      Intake/Output Summary (Last 24 hours) at 3/10/2020 1032  Last data filed at 3/10/2020 0413  Gross per 24 hour   Intake 1080 ml   Output 300 ml   Net 780 ml       Physical Exam   Constitutional: He is oriented to person, place, and time. He appears well-developed and well-nourished. No distress.   HENT:   Head: Normocephalic and atraumatic.   Right Ear: Hearing and external ear normal.   Left Ear: Hearing and external ear normal.   Nose: Nose normal. No mucosal edema or rhinorrhea. No epistaxis.    Mouth/Throat: Uvula is midline, oropharynx is clear and moist and mucous membranes are normal.   Eyes: Pupils are equal, round, and reactive to light. Conjunctivae and EOM are normal. Right eye exhibits no chemosis and no discharge. Left eye exhibits no chemosis and no discharge.   Neck: Trachea normal and normal range of motion. Neck supple. No thyroid mass and no thyromegaly present.   Cardiovascular: Normal rate, regular rhythm, normal heart sounds and intact distal pulses.   No murmur heard.  Pulses:       Dorsalis pedis pulses are 2+ on the right side, and 2+ on the left side.   Pulmonary/Chest: Effort normal and breath sounds normal. No respiratory distress. He has no decreased breath sounds. He has no wheezes.   Abdominal: Soft. He exhibits distension. Bowel sounds are decreased. There is generalized tenderness.   Musculoskeletal: Normal range of motion. He exhibits no edema.   Lymphadenopathy:     He has no cervical adenopathy.     He has no axillary adenopathy.        Right: No supraclavicular adenopathy present.        Left: No supraclavicular adenopathy present.   Neurological: He is alert and oriented to person, place, and time. He has normal strength.   Skin: Skin is warm, dry and intact. Capillary refill takes less than 2 seconds. No rash noted. He is not diaphoretic. There is pallor.   Psychiatric: His speech is normal and behavior is normal. Judgment and thought content normal. His mood appears anxious. Cognition and memory are normal.   Nursing note and vitals reviewed.      Significant Labs:   CBC:   Recent Labs   Lab 03/09/20  0600 03/10/20  0444   WBC 4.62 4.21   HGB 12.5* 12.1*   HCT 39.4* 37.1*    190    and CMP:   Recent Labs   Lab 03/09/20  0600 03/10/20  0444    137   K 4.0 4.2    102   CO2 21* 26   GLU 73 79   BUN 8 9   CREATININE 0.7 0.8   CALCIUM 9.1 9.1   ANIONGAP 13 9   EGFRNONAA >60 >60       Diagnostic Results:  I have reviewed all pertinent imaging  results/findings within the past 24 hours.    Assessment/Plan:     Hepatocellular carcinoma  The patient has received 1 dose of immunotherapy with nivolumab 1 week ago.  With increased abdominal discomfort would recommend that patient be started probably on Duragesic 50 mcg patch Q 72 hr.  And allow for control.  Combination of disease portal vein thrombosis as well as retroperitoneal lymphadenopathy.  I have spoken to the family about code status and they agreed to a do not resuscitate order he has had advance care directive planning placed.  In addition I would recommend that the patient if control is not achieved by 48 hr to consideration of radiation therapy to see whether not any stereotactic radiation could be offered to patient since he does have an area of pinpoint control.  Awaiting immunotherapy benefits at best 30-40% and noncurative setting.  03/08/2020 patient's abdominal discomfort appears to be a little bit less intense this morning but still of concern spoke with he and his wife at bedside Duragesic patches not been placed as of yet I have spoken to Hospital Medicine recommended that Duragesic patch be placed explain the rationale for doing this because of his inability to completely swallow are predictable basis his MS Contin he had not had his methadone filled.  As recommended by palliative care.  Discussed implications with him and hopefully will have radiation therapy evaluation for possible stereotactic radiation if beneficial  3/9/2020: Duragesic patch in place, patient reports improvement in pain level, 6/10 to abdomen. Nausea improved, diet to be advanced today to Regular. Radiation Oncology consulted to determine if able to provide palliative radiation.     3/10/2020: Patient has been using Duragesic patch since Sunday and states it has significantly improved his pain level. He is tolerating a regular diet at this time. Still complains of nausea but is managed with nausea medications. Will  place orders to ensure patient has adequate supply of nausea medication at home. Orders placed for Duragesic patches to be picked up from pharmacy.  Patient and spouse educated on use of home medications, patient will use the Duragesic patches and Roxicodone for breakthrough pain as ordered. He does have a home supply of methadone, he has not started taking this medication yet, he will continue to hold the methadone at this time. If his pain needs begins to increase he knows to call the clinic to discuss further management prior to changing the use of home medications including Methadone. Spouse in agreement with plan.         Thank you for your consult. I will follow-up with patient. Please contact us if you have any additional questions.     Cammie Dawson NP  Hematology/Oncology  [facility]

## 2020-03-10 NOTE — PLAN OF CARE
"Shift assessment completed.    Patient updated on POC for the day, denies pain, sob.   PIV - SL   Neuro: WDL  CVR: Continuous telemetry monitoring maintained, patient SB, HR= 50"s  Resp: RA  GI:C/O nausea- prn medication given and effective   :WSL  Ms: ambulates in room   Reviewed fall precautions with patient  "

## 2020-03-10 NOTE — PROGRESS NOTES
[facility] Hospital Medicine  Progress Note    Patient Name: Ottoniel Arellano Jr.  MRN: [id number]  Patient Class: IP- Inpatient   Admission Date: 3/6/2020  Length of Stay: 0 days  Attending Physician: Ko Maldonado MD  Primary Care Provider: David Stevens MD        Subjective:     Principal Problem:Abdominal pain    HPI:  Ottoniel Arellano is an 79 year old male with CAD, arthritis, and HCC who presented for evaluation of worsening abd pain which onset gradually several days PTA. He reports chronic diffuse abdominal pain, nausea, decreased appetite and weight loss, but denies other symptoms including fever and chills. Pt reports that he has not been able to keep down any food or liquid. Of note, it appears the patient has been unable to take his oral medications due his chronic symptoms. In the ED, CT abdomen showed 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  No bowel obstruction or evidence for perforation or abscess. Patient placed in observation for intractable n/v and pain control. Code status was discussed with the patient. He is a full code. His wife, Maddy Arellano, is his surrogate medical decision maker.           Overview/Hospital Course:  No notes on file    Interval History:  Continues to have nausea partially controlled but improved.  Appetite returning.  Intermittent abdominal pain better controlled.  Tolerating clear liquids advance.    Review of Systems   Constitutional: Negative.  Negative for chills and fever.   HENT: Negative.  Negative for congestion and sore throat.    Eyes: Negative.  Negative for visual disturbance.   Respiratory: Negative.  Negative for cough, shortness of breath and wheezing.    Cardiovascular: Negative.  Negative for chest pain.   Gastrointestinal: Positive for abdominal pain and nausea. Negative for diarrhea and vomiting.   Endocrine: Negative.    Genitourinary: Negative.     Musculoskeletal: Negative.  Negative for myalgias and neck stiffness.   Skin: Negative.  Negative for color change and pallor.   Allergic/Immunologic: Negative.    Neurological: Negative.    Hematological: Negative.    Psychiatric/Behavioral: Negative.    All other systems reviewed and are negative.    Objective:     Vital Signs (Most Recent):  Temp: 98.6 °F (37 °C) (03/09/20 1510)  Pulse: 69 (03/09/20 1648)  Resp: 18 (03/09/20 1510)  BP: (!) 167/76 (03/09/20 1510)  SpO2: 96 % (03/09/20 1510) Vital Signs (24h Range):  Temp:  [98.3 °F (36.8 °C)-98.6 °F (37 °C)] 98.6 °F (37 °C)  Pulse:  [56-72] 69  Resp:  [18] 18  SpO2:  [93 %-98 %] 96 %  BP: (142-182)/(64-79) 167/76     Weight: 80.5 kg (177 lb 7.5 oz)  Body mass index is 25.46 kg/m².    Intake/Output Summary (Last 24 hours) at 3/9/2020 1927  Last data filed at 3/9/2020 1600  Gross per 24 hour   Intake 2220 ml   Output 525 ml   Net 1695 ml      Physical Exam   Constitutional: He is oriented to person, place, and time. He appears well-developed and well-nourished. No distress.   HENT:   Head: Normocephalic and atraumatic.   Mouth/Throat: Oropharynx is clear and moist.   Eyes: Pupils are equal, round, and reactive to light. Conjunctivae and EOM are normal.   Neck: No JVD present. No thyromegaly present.   Cardiovascular: Normal rate, regular rhythm and normal heart sounds. Exam reveals no gallop and no friction rub.   No murmur heard.  Pulmonary/Chest: Effort normal and breath sounds normal. No respiratory distress. He has no wheezes. He has no rales.   Abdominal: Soft. Bowel sounds are normal. He exhibits no distension. There is tenderness. There is no rebound and no guarding.   Musculoskeletal: Normal range of motion. He exhibits no edema or tenderness.   Lymphadenopathy:     He has no cervical adenopathy.   Neurological: He is alert and oriented to person, place, and time. He has normal reflexes. He displays normal reflexes. No cranial nerve deficit.   Skin: Skin  is warm and dry. No rash noted. He is not diaphoretic. No erythema.   Psychiatric: He has a normal mood and affect. His behavior is normal. Judgment and thought content normal.       Significant Labs: All pertinent labs within the past 24 hours have been reviewed.    Significant Imaging: I have reviewed all pertinent imaging results/findings within the past 24 hours.      Assessment/Plan:      * Abdominal pain  -CT abdomen imaging showed 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  No bowel obstruction or evidence for perforation or abscess. No change from previous imaging.   -Analgesics and antiemetics prn   - Continue symptomatic care.   -Monitor LFTs.   Add Fentanyl patch.  Advance diet.    Intractable nausea and vomiting  IVFs   Antiemetics prn       Essential hypertension  IV metoprolol q8 hr   IV hydralazine prn   Monitor       Hepatocellular carcinoma  -Followed by Dr. Kevin outpatient   -Patient received 1st dose of Nivolamab therapy on 3/3/20  -Oncology consulted   -Supportive care       CAD (coronary artery disease)  Pt NPO   Denies chest pain or shortness of breath.  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

## 2020-03-10 NOTE — ASSESSMENT & PLAN NOTE
-CT abdomen imaging showed 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  No bowel obstruction or evidence for perforation or abscess. No change from previous imaging.   -Analgesics and antiemetics prn   - Continue symptomatic care.   -Monitor LFTs.   Add Fentanyl patch.  Advance diet.

## 2020-03-11 NOTE — PLAN OF CARE
03/11/20 1021   Final Note   Assessment Type Final Discharge Note   Anticipated Discharge Disposition Home-Health   Right Care Referral Info   Post Acute Recommendation Home-care   Facility Name [facility]

## 2020-03-16 NOTE — PROGRESS NOTES
Subjective:       Patient ID: Ottoniel Arellano Jr. is a 79 y.o. male.    Chief Complaint: Hepatocellular carcinoma [C22.0]  HPI: We have an opportunity to see Mr. Ottoniel Arellano Jr. in Hematology Oncology clinic at [facility] on 03/16/2020.  Mr. Ottoniel Arellano Jr. is a 79 y.o. gentleman with upper abdominal pain, workup in July 2019 showed portal vein thrombosis and right liver mass, elevated AFP.  Had liver mass biopsy which showed fibrosis.  Has been treated with lovenox bridging and now on coumadin. Laboratory studies demonstrate rapid increase in alpha fetoprotein CT chest abdomen pelvis demonstrates metastatic disease and retroperitoneal lymphadenopathy outside of liver.    Started on Opdivo for clinical progression c/w HCC.    Has nausea and back arthritic pain.       Hepatocellular carcinoma    7/12/2019 Initial Diagnosis     Hepatocellular carcinoma      2/26/2020 Cancer Staged     Staging form: Liver, AJCC 8th Edition  - Clinical stage from 2/26/2020: Stage PIERRE (cT3, cN1, cM0)      3/3/2020 -  Chemotherapy     Treatment Summary   Plan Name: OP NIVOLUMAB Q2W  Treatment Goal: Control  Status: Active  Start Date: 3/3/2020  End Date: 5/12/2020 (Planned)  Provider: Jarod Kevin MD  Chemotherapy: nivolumab 240 mg in sodium chloride 0.9% 124 mL infusion, 240 mg, Intravenous, Clinic/HOD 1 time, 1 of 6 cycles  Administration: 240 mg (3/3/2020)       Past Medical History:   Diagnosis Date    Arthritis     Coronary artery disease     HCC (hepatocellular carcinoma)     Liver mass      No family history on file.  Social History     Socioeconomic History    Marital status:      Spouse name: Not on file    Number of children: Not on file    Years of education: Not on file    Highest education level: Not on file   Occupational History    Not on file   Social Needs    Financial resource strain: Not on file    Food insecurity:     Worry: Not on file     Inability: Not on file    Transportation  needs:     Medical: Not on file     Non-medical: Not on file   Tobacco Use    Smoking status: Former Smoker    Smokeless tobacco: Former User     Quit date: 5/18/1986   Substance and Sexual Activity    Alcohol use: No    Drug use: Not on file    Sexual activity: Not on file   Lifestyle    Physical activity:     Days per week: Not on file     Minutes per session: Not on file    Stress: Not on file   Relationships    Social connections:     Talks on phone: Not on file     Gets together: Not on file     Attends [employer] service: Not on file     Active member of club or organization: Not on file     Attends meetings of clubs or organizations: Not on file     Relationship status: Not on file   Other Topics Concern    Not on file   Social History Narrative    Not on file     Past Surgical History:   Procedure Laterality Date    ANGIOGRAM, CORONARY, WITH LEFT HEART CATHETERIZATION      stents 12/29/2012    EYE SURGERY      right, skin graft    FLUOROSCOPY N/A 1/21/2020    Procedure: Y90 Mapping;  Surgeon: Ar Ham MD;  Location: [facility] CATH LAB;  Service: General;  Laterality: N/A;    FLUOROSCOPY N/A 2/4/2020    Procedure: TheraSphere Liver Mass;  Surgeon: Ar Ham MD;  Location: [facility] CATH LAB;  Service: General;  Laterality: N/A;    SPINAL FUSION      ACDF 05/04/2010    TONSILLECTOMY       Current Outpatient Medications   Medication Sig Dispense Refill    ascorbic acid (VITAMIN C) 500 MG tablet Take 500 mg by mouth once daily.      aspirin 81 MG Chew Take 81 mg by mouth once daily.      atorvastatin (LIPITOR) 40 MG tablet Take 40 mg by mouth once daily.       cinnamon bark 500 mg capsule Take 1,000 mg by mouth once daily.      diphenhydrAMINE (BENADRYL) 25 mg capsule Take 50 mg by mouth nightly as needed for Itching.      fentaNYL (DURAGESIC) 50 mcg/hr Place 1 patch onto the skin every 72 hours. 5 patch 0    fish oil-omega-3 fatty acids 300-1,000 mg capsule Take 2 g by mouth once  daily.      fluticasone propionate (FLONASE ALLERGY RELIEF) 50 mcg/actuation nasal spray 1 spray by Each Nare route once daily.      meloxicam (MOBIC) 15 MG tablet Take 15 mg by mouth daily as needed for Pain.      methadone (DOLOPHINE) 5 MG tablet Take 1 tablet (5 mg total) by mouth every 6 (six) hours as needed for Pain. 60 tablet 0    metoprolol succinate (TOPROL-XL) 25 MG 24 hr tablet Take 25 mg by mouth once daily.      metoprolol tartrate (LOPRESSOR) 25 MG tablet       mirtazapine (REMERON) 7.5 MG Tab Take 1-2 tablets (7.5-15 mg total) by mouth nightly. For sleep and appetite 60 tablet 11    NEXIUM 40 mg capsule Take 40 mg by mouth daily as needed.       ondansetron (ZOFRAN) 4 MG tablet Take 1 tablet (4 mg total) by mouth every 8 (eight) hours as needed for Nausea. 30 tablet 11    ondansetron (ZOFRAN) 4 MG tablet Take 1 tablet (4 mg total) by mouth every 6 (six) hours as needed for Nausea. 30 tablet 1    oxyCODONE (ROXICODONE) 5 MG immediate release tablet Take 1 tablet (5 mg total) by mouth every 4 (four) hours as needed for Pain. Medically necessary for more than 7 days 90 tablet 0    promethazine (PHENERGAN) 12.5 MG Tab Take 1 tablet (12.5 mg total) by mouth every 6 to 8 hours as needed. 80 tablet 0    tamsulosin (FLOMAX) 0.4 mg Cp24 Take 0.4 mg by mouth every evening.       vitamin D (VITAMIN D3) 1000 units Tab Take 1,000 Units by mouth.      vitamin E 400 unit Tab Take 1 tablet by mouth once daily.      warfarin (COUMADIN) 5 MG tablet Take 2 tablets by mouth on Tuesdays and Thursdays; and 1 tablet on all other days of the week. 40 tablet 3     No current facility-administered medications for this visit.        Labs:  Lab Results   Component Value Date    WBC 4.21 03/10/2020    HGB 12.1 (L) 03/10/2020    HCT 37.1 (L) 03/10/2020    MCV 86 03/10/2020     03/10/2020     BMP  Lab Results   Component Value Date     03/10/2020    K 4.2 03/10/2020     03/10/2020    CO2 26  03/10/2020    BUN 9 03/10/2020    CREATININE 0.8 03/10/2020    CALCIUM 9.1 03/10/2020    ANIONGAP 9 03/10/2020    ESTGFRAFRICA >60 03/10/2020    EGFRNONAA >60 03/10/2020     Lab Results   Component Value Date    ALT 23 03/07/2020    AST 59 (H) 03/07/2020    ALKPHOS 191 (H) 03/07/2020    BILITOT 0.9 03/07/2020       No results found for: IRON, TIBC, FERRITIN, SATURATEDIRO  No results found for: [id number]  No results found for: FOLATE  Lab Results   Component Value Date    TSH 1.941 03/03/2020       I have reviewed the radiology reports and examined the scan/xray images.    Review of Systems   Constitutional: Negative.    HENT: Negative.    Eyes: Negative.    Respiratory: Negative.    Cardiovascular: Negative.    Gastrointestinal: Positive for abdominal pain and nausea.   Endocrine: Negative.    Genitourinary: Negative.    Musculoskeletal: Negative.    Skin: Negative.    Allergic/Immunologic: Negative.    Neurological: Negative.    Hematological: Negative.    Psychiatric/Behavioral: Negative.      ECOG SCORE              Objective:   There were no vitals filed for this visit.There is no height or weight on file to calculate BMI.  Physical Exam   Constitutional: He is oriented to person, place, and time. He appears well-developed and well-nourished.   HENT:   Head: Normocephalic and atraumatic.   Eyes: Conjunctivae and EOM are normal.   Neck: Normal range of motion. Neck supple.   Cardiovascular: Normal rate and regular rhythm.   Pulmonary/Chest: Effort normal and breath sounds normal.   Abdominal: Soft. Bowel sounds are normal.   Musculoskeletal: Normal range of motion.   Neurological: He is alert and oriented to person, place, and time.   Skin: Skin is warm and dry.   Psychiatric: He has a normal mood and affect. His behavior is normal. Judgment and thought content normal.   Nursing note and vitals reviewed.        Assessment:      1. Hepatocellular carcinoma    2. Portal vein thrombosis secondary to HCC invasion     3. Cervicalgia    4. Back pain    5. DDD (degenerative disc disease), cervical    6. DDD (degenerative disc disease), lumbar    7. Cervical spondylosis    8. Lumbar spondylosis    9. Fusion of spine of cervical region    10. Lumbosacral radiculopathy           Plan:     Hepatocellular carcinoma  Continue on opdivo, 2nd dose today  -     fentaNYL (DURAGESIC) 50 mcg/hr; Place 1 patch onto the skin every 72 hours.  Dispense: 10 patch; Refill: 0  -     ondansetron (ZOFRAN) 8 MG tablet; Take 1 tablet (8 mg total) by mouth every 8 (eight) hours as needed for Nausea.  Dispense: 60 tablet; Refill: 2  -     prochlorperazine (COMPAZINE) 10 MG tablet; Take 1 tablet (10 mg total) by mouth every 6 (six) hours as needed.  Dispense: 60 tablet; Refill: 1  -     OLANZapine (ZYPREXA) 5 MG tablet; Take 1 tablet (5 mg total) by mouth nightly as needed (nausea).  Dispense: 30 tablet; Refill: 2  -     dronabinoL (MARINOL) 5 MG capsule; Take 1 capsule (5 mg total) by mouth 2 (two) times daily before meals.  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

## 2020-03-17 ENCOUNTER — OFFICE VISIT (OUTPATIENT)
Dept: HEMATOLOGY/ONCOLOGY | Facility: CLINIC | Age: 80
DRG: 947 | End: 2020-03-17
Payer: MEDICARE

## 2020-03-17 ENCOUNTER — SOCIAL WORK (OUTPATIENT)
Dept: HEMATOLOGY/ONCOLOGY | Facility: CLINIC | Age: 80
End: 2020-03-17

## 2020-03-17 ENCOUNTER — INFUSION (OUTPATIENT)
Dept: INFUSION THERAPY | Facility: HOSPITAL | Age: 80
End: 2020-03-17
Attending: INTERNAL MEDICINE
Payer: MEDICARE

## 2020-03-17 VITALS
SYSTOLIC BLOOD PRESSURE: 164 MMHG | TEMPERATURE: 97 F | DIASTOLIC BLOOD PRESSURE: 75 MMHG | OXYGEN SATURATION: 97 % | WEIGHT: 176.56 LBS | RESPIRATION RATE: 16 BRPM | HEIGHT: 70 IN | HEART RATE: 66 BPM | BODY MASS INDEX: 25.28 KG/M2

## 2020-03-17 VITALS
TEMPERATURE: 97 F | WEIGHT: 176.56 LBS | OXYGEN SATURATION: 98 % | HEIGHT: 70 IN | SYSTOLIC BLOOD PRESSURE: 165 MMHG | HEART RATE: 79 BPM | DIASTOLIC BLOOD PRESSURE: 82 MMHG | BODY MASS INDEX: 25.28 KG/M2

## 2020-03-17 DIAGNOSIS — I81 PORTAL VEIN THROMBOSIS SECONDARY TO HCC INVASION: ICD-10-CM

## 2020-03-17 DIAGNOSIS — C22.0 PORTAL VEIN THROMBOSIS SECONDARY TO HCC INVASION: ICD-10-CM

## 2020-03-17 DIAGNOSIS — M43.22 FUSION OF SPINE OF CERVICAL REGION: ICD-10-CM

## 2020-03-17 DIAGNOSIS — C22.0 HEPATOCELLULAR CARCINOMA: Primary | ICD-10-CM

## 2020-03-17 DIAGNOSIS — M47.812 CERVICAL SPONDYLOSIS: ICD-10-CM

## 2020-03-17 DIAGNOSIS — M50.30 DDD (DEGENERATIVE DISC DISEASE), CERVICAL: ICD-10-CM

## 2020-03-17 DIAGNOSIS — M51.36 DDD (DEGENERATIVE DISC DISEASE), LUMBAR: ICD-10-CM

## 2020-03-17 DIAGNOSIS — M47.816 LUMBAR SPONDYLOSIS: ICD-10-CM

## 2020-03-17 DIAGNOSIS — M54.2 CERVICALGIA: ICD-10-CM

## 2020-03-17 DIAGNOSIS — M54.17 LUMBOSACRAL RADICULOPATHY: ICD-10-CM

## 2020-03-17 PROCEDURE — 99215 PR OFFICE/OUTPT VISIT, EST, LEVL V, 40-54 MIN: ICD-10-PCS | Mod: S$PBB,,, | Performed by: INTERNAL MEDICINE

## 2020-03-17 PROCEDURE — 96413 CHEMO IV INFUSION 1 HR: CPT

## 2020-03-17 PROCEDURE — 96367 TX/PROPH/DG ADDL SEQ IV INF: CPT

## 2020-03-17 PROCEDURE — 99215 OFFICE O/P EST HI 40 MIN: CPT | Mod: S$PBB,,, | Performed by: INTERNAL MEDICINE

## 2020-03-17 PROCEDURE — 63600175 PHARM REV CODE 636 W HCPCS: Performed by: INTERNAL MEDICINE

## 2020-03-17 PROCEDURE — 96375 TX/PRO/DX INJ NEW DRUG ADDON: CPT

## 2020-03-17 PROCEDURE — 99999 PR PBB SHADOW E&M-EST. PATIENT-LVL IV: ICD-10-PCS | Mod: PBBFAC,,, | Performed by: INTERNAL MEDICINE

## 2020-03-17 PROCEDURE — 99214 OFFICE O/P EST MOD 30 MIN: CPT | Mod: PBBFAC,25 | Performed by: INTERNAL MEDICINE

## 2020-03-17 PROCEDURE — 63600175 PHARM REV CODE 636 W HCPCS: Mod: JG | Performed by: INTERNAL MEDICINE

## 2020-03-17 PROCEDURE — 99999 PR PBB SHADOW E&M-EST. PATIENT-LVL IV: CPT | Mod: PBBFAC,,, | Performed by: INTERNAL MEDICINE

## 2020-03-17 RX ORDER — ONDANSETRON 2 MG/ML
8 INJECTION INTRAMUSCULAR; INTRAVENOUS
Status: COMPLETED | OUTPATIENT
Start: 2020-03-17 | End: 2020-03-17

## 2020-03-17 RX ORDER — ONDANSETRON HYDROCHLORIDE 8 MG/1
8 TABLET, FILM COATED ORAL EVERY 8 HOURS PRN
Qty: 60 TABLET | Refills: 2 | Status: SHIPPED | OUTPATIENT
Start: 2020-03-17 | End: 2020-03-31 | Stop reason: SDUPTHER

## 2020-03-17 RX ORDER — METOPROLOL SUCCINATE 50 MG/1
50 TABLET, EXTENDED RELEASE ORAL DAILY
Qty: 30 TABLET | Refills: 11 | Status: SHIPPED | OUTPATIENT
Start: 2020-03-17

## 2020-03-17 RX ORDER — DRONABINOL 5 MG/1
5 CAPSULE ORAL
Qty: 60 CAPSULE | Refills: 1 | Status: SHIPPED | OUTPATIENT
Start: 2020-03-17

## 2020-03-17 RX ORDER — PROCHLORPERAZINE MALEATE 10 MG
10 TABLET ORAL EVERY 6 HOURS PRN
Qty: 60 TABLET | Refills: 1 | Status: SHIPPED | OUTPATIENT
Start: 2020-03-17 | End: 2020-03-31 | Stop reason: SDUPTHER

## 2020-03-17 RX ORDER — HEPARIN 100 UNIT/ML
500 SYRINGE INTRAVENOUS
Status: CANCELLED | OUTPATIENT
Start: 2020-03-17

## 2020-03-17 RX ORDER — OLANZAPINE 5 MG/1
5 TABLET ORAL NIGHTLY PRN
Qty: 30 TABLET | Refills: 2 | Status: SHIPPED | OUTPATIENT
Start: 2020-03-17 | End: 2021-03-17

## 2020-03-17 RX ORDER — FENTANYL 50 UG/1
1 PATCH TRANSDERMAL
Qty: 10 PATCH | Refills: 0 | Status: ON HOLD | OUTPATIENT
Start: 2020-03-17 | End: 2020-03-21 | Stop reason: HOSPADM

## 2020-03-17 RX ORDER — SODIUM CHLORIDE 0.9 % (FLUSH) 0.9 %
10 SYRINGE (ML) INJECTION
Status: CANCELLED | OUTPATIENT
Start: 2020-03-17

## 2020-03-17 RX ADMIN — SODIUM CHLORIDE 240 MG: 9 INJECTION, SOLUTION INTRAVENOUS at 11:03

## 2020-03-17 RX ADMIN — SODIUM CHLORIDE: 9 INJECTION, SOLUTION INTRAVENOUS at 11:03

## 2020-03-17 RX ADMIN — ONDANSETRON 8 MG: 2 INJECTION INTRAMUSCULAR; INTRAVENOUS at 11:03

## 2020-03-17 NOTE — PROGRESS NOTES
Met with pt and wife to f/u. Pt in wheelchair and appears to have declined since his hospitalization. Presents for infusion treatment today. Will assist with another gas card ($25) today; will provide ongoing assistance as needed. Provided emotional support and reassurance today. Will remain available for ongoing support and assistance.

## 2020-03-19 ENCOUNTER — HOSPITAL ENCOUNTER (INPATIENT)
Facility: HOSPITAL | Age: 80
LOS: 2 days | Discharge: HOME HEALTH | DRG: 947 | End: 2020-03-21
Attending: EMERGENCY MEDICINE | Admitting: EMERGENCY MEDICINE
Payer: MEDICARE

## 2020-03-19 ENCOUNTER — TELEPHONE (OUTPATIENT)
Dept: RADIOLOGY | Facility: HOSPITAL | Age: 80
End: 2020-03-19

## 2020-03-19 DIAGNOSIS — Z85.05 HISTORY OF HEPATOCELLULAR CARCINOMA: ICD-10-CM

## 2020-03-19 DIAGNOSIS — K85.90 ACUTE PANCREATITIS, UNSPECIFIED COMPLICATION STATUS, UNSPECIFIED PANCREATITIS TYPE: ICD-10-CM

## 2020-03-19 DIAGNOSIS — R11.2 NON-INTRACTABLE VOMITING WITH NAUSEA, UNSPECIFIED VOMITING TYPE: ICD-10-CM

## 2020-03-19 DIAGNOSIS — G89.3 NEOPLASM RELATED PAIN: Primary | ICD-10-CM

## 2020-03-19 DIAGNOSIS — R10.9 ABDOMINAL PAIN: ICD-10-CM

## 2020-03-19 PROBLEM — Z51.5 ENCOUNTER FOR PALLIATIVE CARE: Status: ACTIVE | Noted: 2020-03-19

## 2020-03-19 PROBLEM — R63.0 ANOREXIA: Status: ACTIVE | Noted: 2020-03-19

## 2020-03-19 LAB
ALBUMIN SERPL BCP-MCNC: 3.3 G/DL (ref 3.5–5.2)
ALP SERPL-CCNC: 316 U/L (ref 55–135)
ALT SERPL W/O P-5'-P-CCNC: 44 U/L (ref 10–44)
ANION GAP SERPL CALC-SCNC: 13 MMOL/L (ref 8–16)
APTT BLDCRRT: 23.9 SEC (ref 21–32)
AST SERPL-CCNC: 128 U/L (ref 10–40)
BASOPHILS # BLD AUTO: 0.05 K/UL (ref 0–0.2)
BASOPHILS NFR BLD: 0.9 % (ref 0–1.9)
BILIRUB SERPL-MCNC: 2.4 MG/DL (ref 0.1–1)
BILIRUB UR QL STRIP: ABNORMAL
BNP SERPL-MCNC: 181 PG/ML (ref 0–99)
BUN SERPL-MCNC: 8 MG/DL (ref 8–23)
CALCIUM SERPL-MCNC: 10 MG/DL (ref 8.7–10.5)
CHLORIDE SERPL-SCNC: 97 MMOL/L (ref 95–110)
CLARITY UR: CLEAR
CO2 SERPL-SCNC: 28 MMOL/L (ref 23–29)
COLOR UR: YELLOW
CREAT SERPL-MCNC: 0.8 MG/DL (ref 0.5–1.4)
DIFFERENTIAL METHOD: ABNORMAL
EOSINOPHIL # BLD AUTO: 0.1 K/UL (ref 0–0.5)
EOSINOPHIL NFR BLD: 0.9 % (ref 0–8)
ERYTHROCYTE [DISTWIDTH] IN BLOOD BY AUTOMATED COUNT: 18.6 % (ref 11.5–14.5)
EST. GFR  (AFRICAN AMERICAN): >60 ML/MIN/1.73 M^2
EST. GFR  (NON AFRICAN AMERICAN): >60 ML/MIN/1.73 M^2
GLUCOSE SERPL-MCNC: 87 MG/DL (ref 70–110)
GLUCOSE UR QL STRIP: NEGATIVE
HCT VFR BLD AUTO: 41 % (ref 40–54)
HGB BLD-MCNC: 13.1 G/DL (ref 14–18)
HGB UR QL STRIP: NEGATIVE
IMM GRANULOCYTES # BLD AUTO: 0.03 K/UL (ref 0–0.04)
IMM GRANULOCYTES NFR BLD AUTO: 0.5 % (ref 0–0.5)
INR PPP: 1.2 (ref 0.8–1.2)
KETONES UR QL STRIP: ABNORMAL
LACTATE SERPL-SCNC: 2.3 MMOL/L (ref 0.5–2.2)
LEUKOCYTE ESTERASE UR QL STRIP: NEGATIVE
LIPASE SERPL-CCNC: 130 U/L (ref 4–60)
LYMPHOCYTES # BLD AUTO: 0.5 K/UL (ref 1–4.8)
LYMPHOCYTES NFR BLD: 9.1 % (ref 18–48)
MCH RBC QN AUTO: 27.5 PG (ref 27–31)
MCHC RBC AUTO-ENTMCNC: 32 G/DL (ref 32–36)
MCV RBC AUTO: 86 FL (ref 82–98)
MONOCYTES # BLD AUTO: 0.6 K/UL (ref 0.3–1)
MONOCYTES NFR BLD: 9.6 % (ref 4–15)
NEUTROPHILS # BLD AUTO: 4.6 K/UL (ref 1.8–7.7)
NEUTROPHILS NFR BLD: 79 % (ref 38–73)
NITRITE UR QL STRIP: NEGATIVE
NRBC BLD-RTO: 0 /100 WBC
PH UR STRIP: 6 [PH] (ref 5–8)
PLATELET # BLD AUTO: 229 K/UL (ref 150–350)
PMV BLD AUTO: 9.8 FL (ref 9.2–12.9)
POTASSIUM SERPL-SCNC: 4.6 MMOL/L (ref 3.5–5.1)
PROT SERPL-MCNC: 7.3 G/DL (ref 6–8.4)
PROT UR QL STRIP: NEGATIVE
PROTHROMBIN TIME: 12.4 SEC (ref 9–12.5)
RBC # BLD AUTO: 4.76 M/UL (ref 4.6–6.2)
SODIUM SERPL-SCNC: 138 MMOL/L (ref 136–145)
SP GR UR STRIP: 1.02 (ref 1–1.03)
TROPONIN I SERPL DL<=0.01 NG/ML-MCNC: <0.006 NG/ML (ref 0–0.03)
URN SPEC COLLECT METH UR: ABNORMAL
UROBILINOGEN UR STRIP-ACNC: ABNORMAL EU/DL
WBC # BLD AUTO: 5.81 K/UL (ref 3.9–12.7)

## 2020-03-19 PROCEDURE — 93010 ELECTROCARDIOGRAM REPORT: CPT | Mod: ,,, | Performed by: INTERNAL MEDICINE

## 2020-03-19 PROCEDURE — 83690 ASSAY OF LIPASE: CPT

## 2020-03-19 PROCEDURE — 63600175 PHARM REV CODE 636 W HCPCS: Performed by: EMERGENCY MEDICINE

## 2020-03-19 PROCEDURE — 96365 THER/PROPH/DIAG IV INF INIT: CPT

## 2020-03-19 PROCEDURE — 99223 1ST HOSP IP/OBS HIGH 75: CPT | Mod: ,,, | Performed by: INTERNAL MEDICINE

## 2020-03-19 PROCEDURE — 96375 TX/PRO/DX INJ NEW DRUG ADDON: CPT

## 2020-03-19 PROCEDURE — 93010 EKG 12-LEAD: ICD-10-PCS | Mod: ,,, | Performed by: INTERNAL MEDICINE

## 2020-03-19 PROCEDURE — 83880 ASSAY OF NATRIURETIC PEPTIDE: CPT

## 2020-03-19 PROCEDURE — 83605 ASSAY OF LACTIC ACID: CPT

## 2020-03-19 PROCEDURE — 63600175 PHARM REV CODE 636 W HCPCS: Performed by: NURSE PRACTITIONER

## 2020-03-19 PROCEDURE — 25000003 PHARM REV CODE 250: Performed by: PHYSICIAN ASSISTANT

## 2020-03-19 PROCEDURE — 25000003 PHARM REV CODE 250: Performed by: NURSE PRACTITIONER

## 2020-03-19 PROCEDURE — 96361 HYDRATE IV INFUSION ADD-ON: CPT

## 2020-03-19 PROCEDURE — 85610 PROTHROMBIN TIME: CPT

## 2020-03-19 PROCEDURE — 81003 URINALYSIS AUTO W/O SCOPE: CPT

## 2020-03-19 PROCEDURE — 25500020 PHARM REV CODE 255: Performed by: EMERGENCY MEDICINE

## 2020-03-19 PROCEDURE — 85025 COMPLETE CBC W/AUTO DIFF WBC: CPT

## 2020-03-19 PROCEDURE — 21400001 HC TELEMETRY ROOM

## 2020-03-19 PROCEDURE — 99285 EMERGENCY DEPT VISIT HI MDM: CPT | Mod: 25

## 2020-03-19 PROCEDURE — 99223 1ST HOSP IP/OBS HIGH 75: CPT | Mod: ,,, | Performed by: PHYSICIAN ASSISTANT

## 2020-03-19 PROCEDURE — 99223 PR INITIAL HOSPITAL CARE,LEVL III: ICD-10-PCS | Mod: ,,, | Performed by: INTERNAL MEDICINE

## 2020-03-19 PROCEDURE — 80053 COMPREHEN METABOLIC PANEL: CPT

## 2020-03-19 PROCEDURE — 63600175 PHARM REV CODE 636 W HCPCS: Performed by: PHYSICIAN ASSISTANT

## 2020-03-19 PROCEDURE — 87040 BLOOD CULTURE FOR BACTERIA: CPT | Mod: 59

## 2020-03-19 PROCEDURE — 84484 ASSAY OF TROPONIN QUANT: CPT

## 2020-03-19 PROCEDURE — 93005 ELECTROCARDIOGRAM TRACING: CPT

## 2020-03-19 PROCEDURE — 85730 THROMBOPLASTIN TIME PARTIAL: CPT

## 2020-03-19 PROCEDURE — 99223 PR INITIAL HOSPITAL CARE,LEVL III: ICD-10-PCS | Mod: ,,, | Performed by: PHYSICIAN ASSISTANT

## 2020-03-19 RX ORDER — OLANZAPINE 5 MG/1
5 TABLET, ORALLY DISINTEGRATING ORAL NIGHTLY
Status: DISCONTINUED | OUTPATIENT
Start: 2020-03-19 | End: 2020-03-21 | Stop reason: HOSPADM

## 2020-03-19 RX ORDER — ONDANSETRON 2 MG/ML
8 INJECTION INTRAMUSCULAR; INTRAVENOUS EVERY 6 HOURS
Status: DISCONTINUED | OUTPATIENT
Start: 2020-03-19 | End: 2020-03-20

## 2020-03-19 RX ORDER — PROMETHAZINE HYDROCHLORIDE 25 MG/ML
12.5 INJECTION, SOLUTION INTRAMUSCULAR; INTRAVENOUS EVERY 6 HOURS PRN
Status: DISCONTINUED | OUTPATIENT
Start: 2020-03-19 | End: 2020-03-20

## 2020-03-19 RX ORDER — SODIUM CHLORIDE 9 MG/ML
INJECTION, SOLUTION INTRAVENOUS ONCE
Status: COMPLETED | OUTPATIENT
Start: 2020-03-19 | End: 2020-03-19

## 2020-03-19 RX ORDER — METOPROLOL SUCCINATE 50 MG/1
50 TABLET, EXTENDED RELEASE ORAL DAILY
Status: DISCONTINUED | OUTPATIENT
Start: 2020-03-20 | End: 2020-03-20

## 2020-03-19 RX ORDER — MORPHINE SULFATE 4 MG/ML
8 INJECTION, SOLUTION INTRAMUSCULAR; INTRAVENOUS
Status: COMPLETED | OUTPATIENT
Start: 2020-03-19 | End: 2020-03-19

## 2020-03-19 RX ORDER — ENOXAPARIN SODIUM 100 MG/ML
40 INJECTION SUBCUTANEOUS EVERY 24 HOURS
Status: DISCONTINUED | OUTPATIENT
Start: 2020-03-19 | End: 2020-03-21 | Stop reason: HOSPADM

## 2020-03-19 RX ORDER — ONDANSETRON 2 MG/ML
4 INJECTION INTRAMUSCULAR; INTRAVENOUS
Status: COMPLETED | OUTPATIENT
Start: 2020-03-19 | End: 2020-03-19

## 2020-03-19 RX ORDER — FENTANYL 75 UG/H
1 PATCH TRANSDERMAL
Status: DISCONTINUED | OUTPATIENT
Start: 2020-03-19 | End: 2020-03-21 | Stop reason: HOSPADM

## 2020-03-19 RX ORDER — ONDANSETRON 2 MG/ML
8 INJECTION INTRAMUSCULAR; INTRAVENOUS EVERY 6 HOURS PRN
Status: DISCONTINUED | OUTPATIENT
Start: 2020-03-19 | End: 2020-03-19

## 2020-03-19 RX ORDER — TAMSULOSIN HYDROCHLORIDE 0.4 MG/1
0.4 CAPSULE ORAL NIGHTLY
Status: DISCONTINUED | OUTPATIENT
Start: 2020-03-19 | End: 2020-03-21 | Stop reason: HOSPADM

## 2020-03-19 RX ORDER — PANTOPRAZOLE SODIUM 40 MG/1
40 TABLET, DELAYED RELEASE ORAL DAILY
Status: DISCONTINUED | OUTPATIENT
Start: 2020-03-20 | End: 2020-03-21 | Stop reason: HOSPADM

## 2020-03-19 RX ORDER — MORPHINE SULFATE 4 MG/ML
4 INJECTION, SOLUTION INTRAMUSCULAR; INTRAVENOUS
Status: COMPLETED | OUTPATIENT
Start: 2020-03-19 | End: 2020-03-19

## 2020-03-19 RX ORDER — MORPHINE SULFATE 4 MG/ML
4 INJECTION, SOLUTION INTRAMUSCULAR; INTRAVENOUS
Status: DISCONTINUED | OUTPATIENT
Start: 2020-03-19 | End: 2020-03-20

## 2020-03-19 RX ADMIN — ONDANSETRON 8 MG: 2 INJECTION INTRAMUSCULAR; INTRAVENOUS at 11:03

## 2020-03-19 RX ADMIN — OLANZAPINE 5 MG: 5 TABLET, ORALLY DISINTEGRATING ORAL at 08:03

## 2020-03-19 RX ADMIN — SODIUM CHLORIDE: 0.9 INJECTION, SOLUTION INTRAVENOUS at 08:03

## 2020-03-19 RX ADMIN — ONDANSETRON 8 MG: 2 INJECTION INTRAMUSCULAR; INTRAVENOUS at 06:03

## 2020-03-19 RX ADMIN — FENTANYL 1 PATCH: 75 PATCH, EXTENDED RELEASE TRANSDERMAL at 05:03

## 2020-03-19 RX ADMIN — MORPHINE SULFATE 4 MG: 4 INJECTION, SOLUTION INTRAMUSCULAR; INTRAVENOUS at 02:03

## 2020-03-19 RX ADMIN — MORPHINE SULFATE 8 MG: 4 INJECTION, SOLUTION INTRAMUSCULAR; INTRAVENOUS at 10:03

## 2020-03-19 RX ADMIN — SODIUM CHLORIDE, SODIUM LACTATE, POTASSIUM CHLORIDE, AND CALCIUM CHLORIDE 1000 ML: .6; .31; .03; .02 INJECTION, SOLUTION INTRAVENOUS at 11:03

## 2020-03-19 RX ADMIN — IOHEXOL 75 ML: 350 INJECTION, SOLUTION INTRAVENOUS at 12:03

## 2020-03-19 RX ADMIN — ONDANSETRON 4 MG: 2 INJECTION INTRAMUSCULAR; INTRAVENOUS at 10:03

## 2020-03-19 RX ADMIN — TAMSULOSIN HYDROCHLORIDE 0.4 MG: 0.4 CAPSULE ORAL at 08:03

## 2020-03-19 RX ADMIN — PROMETHAZINE HYDROCHLORIDE 12.5 MG: 25 INJECTION INTRAMUSCULAR; INTRAVENOUS at 08:03

## 2020-03-19 RX ADMIN — MORPHINE SULFATE 4 MG: 4 INJECTION INTRAVENOUS at 06:03

## 2020-03-19 RX ADMIN — CEFTRIAXONE 2 G: 2 INJECTION, SOLUTION INTRAVENOUS at 11:03

## 2020-03-19 RX ADMIN — ENOXAPARIN SODIUM 40 MG: 100 INJECTION SUBCUTANEOUS at 08:03

## 2020-03-19 NOTE — ASSESSMENT & PLAN NOTE
-will consult Nutrition   -oral intake encouraged   -appetite stimulants held- will resume when appropriate

## 2020-03-19 NOTE — H&P
[facility]  History & Physical    Patient Name: Ottoniel Arellano Jr.  MRN: [id number]  Admission Date: 3/19/2020  Attending Physician: Pepito Briseno MD   Primary Care Provider: David Stevens MD         Patient information was obtained from patient and ER records.     Subjective:     Principal Problem:Hepatocellular Carcinoma    Chief Complaint:   Chief Complaint   Patient presents with    Abdominal Pain     pt c/o abd pain and nausea since last night, pt was seen in the ED last night and sent home and states he felt bad again this morning        HPI: Ottoniel Arellano Jr. is a 79 y.o. male patient with a PMHx of HCC, CAD  Liver Mass, and Arthritis who presents to the Emergency Department for generalized abdominal pain which onset just prior to arrival. Pt presented to the ED on 3/18/2020 wth similar symptom presentation.  Symptoms are constant and moderate in severity. No mitigating or exacerbating factors reported. Associated sxs include weakness, back pain, fatigue, decreased oral intake, nausea and abdominal distention. Patient denies any fever, chills, vomiting, SOB, CP, weakness, numbness, dizziness, headache, and all other sxs at this time. No further complaints or concerns at this time.  Pt denies smoking and use of ETOH.  Pt reports compliance with prescribed medications but did not take them today.  Pt is a full code and Maddy Arellano (wife) at [phone] is the surrogate decision maker.  ER work up showed: , Lactic 2.3, Trace ketones, Urobilirubin 4-6, Lipase 130, , Bilirubin 2.4, and Alk Phos 316.  CT of abdomen/pelvis showed extensive infiltrative hepatocellular carcinoma with portal vein tumor thrombus, ascites, and portacaval lymphadenopathy with no bowel obstruction or evidence for perforation or abscess.  US showed heterogeneity of the liver parenchyma associated with hepatocellular carcinoma and nodular contour compatible with cirrhosis. Thrombosed portal  vein and portal vein branches with cavernous transformation at the sylvia hepatis.  Nonspecific, heterogeneous appearance of the partially visualized pancreas.  No focal abnormality.  Gallbladder wall thickening, likely related to intrinsic hepatic disease.  No calcified stones or sonographic evidence of acute cholecystitis.  Splenomegaly and trace ascites. Hospital Medicine consulted for further management and pt placed on Telemetry Unit.      Past Medical History:   Diagnosis Date    Arthritis     Coronary artery disease     HCC (hepatocellular carcinoma)     Liver mass        Past Surgical History:   Procedure Laterality Date    ANGIOGRAM, CORONARY, WITH LEFT HEART CATHETERIZATION      stents 12/29/2012    EYE SURGERY      right, skin graft    FLUOROSCOPY N/A 1/21/2020    Procedure: Y90 Mapping;  Surgeon: Ar Ham MD;  Location: [facility];  Service: General;  Laterality: N/A;    FLUOROSCOPY N/A 2/4/2020    Procedure: TheraSphere Liver Mass;  Surgeon: Ar Ham MD;  Location: [facility];  Service: General;  Laterality: N/A;    SPINAL FUSION      ACDF 05/04/2010    TONSILLECTOMY         Review of patient's allergies indicates:   Allergen Reactions    Penicillins Hives and Rash    Shellfish containing products      Pt states face and hands turn blotchy red and itch       Current Facility-Administered Medications on File Prior to Encounter   Medication    [COMPLETED] morphine injection 6 mg    [COMPLETED] ondansetron injection 8 mg    [DISCONTINUED] ondansetron injection 8 mg     Current Outpatient Medications on File Prior to Encounter   Medication Sig    ascorbic acid (VITAMIN C) 500 MG tablet Take 500 mg by mouth once daily.    aspirin 81 MG Chew Take 81 mg by mouth once daily.    atorvastatin (LIPITOR) 40 MG tablet Take 40 mg by mouth once daily.     cinnamon bark 500 mg capsule Take 1,000 mg by mouth once daily.    diphenhydrAMINE (BENADRYL) 25 mg capsule Take 50 mg by  mouth nightly as needed for Itching.    dronabinoL (MARINOL) 5 MG capsule Take 1 capsule (5 mg total) by mouth 2 (two) times daily before meals.    fentaNYL (DURAGESIC) 50 mcg/hr Place 1 patch onto the skin every 72 hours.    fish oil-omega-3 fatty acids 300-1,000 mg capsule Take 2 g by mouth once daily.    fluticasone propionate (FLONASE ALLERGY RELIEF) 50 mcg/actuation nasal spray 1 spray by Each Nare route once daily.    meloxicam (MOBIC) 15 MG tablet Take 15 mg by mouth daily as needed for Pain.    methadone (DOLOPHINE) 5 MG tablet Take 1 tablet (5 mg total) by mouth every 6 (six) hours as needed for Pain.    metoprolol succinate (TOPROL-XL) 50 MG 24 hr tablet Take 1 tablet (50 mg total) by mouth once daily.    metoprolol tartrate (LOPRESSOR) 25 MG tablet     mirtazapine (REMERON) 7.5 MG Tab Take 1-2 tablets (7.5-15 mg total) by mouth nightly. For sleep and appetite    NEXIUM 40 mg capsule Take 40 mg by mouth daily as needed.     OLANZapine (ZYPREXA) 5 MG tablet Take 1 tablet (5 mg total) by mouth nightly as needed (nausea).    ondansetron (ZOFRAN) 8 MG tablet Take 1 tablet (8 mg total) by mouth every 8 (eight) hours as needed for Nausea.    oxyCODONE (ROXICODONE) 5 MG immediate release tablet Take 1 tablet (5 mg total) by mouth every 4 (four) hours as needed for Pain. Medically necessary for more than 7 days    prochlorperazine (COMPAZINE) 10 MG tablet Take 1 tablet (10 mg total) by mouth every 6 (six) hours as needed.    promethazine (PHENERGAN) 12.5 MG Tab Take 1 tablet (12.5 mg total) by mouth every 6 to 8 hours as needed.    tamsulosin (FLOMAX) 0.4 mg Cp24 Take 0.4 mg by mouth every evening.     vitamin D (VITAMIN D3) 1000 units Tab Take 1,000 Units by mouth.    vitamin E 400 unit Tab Take 1 tablet by mouth once daily.     Family History     None        Tobacco Use    Smoking status: Former Smoker    Smokeless tobacco: Former User     Quit date: 5/18/1986   Substance and Sexual Activity     Alcohol use: No    Drug use: Not on file    Sexual activity: Not on file     Review of Systems   Constitutional: Positive for activity change, appetite change and fatigue. Negative for fever.   HENT: Negative for congestion, sinus pressure, sore throat and trouble swallowing.    Eyes: Negative.    Respiratory: Negative for cough, shortness of breath and wheezing.    Cardiovascular: Negative for chest pain, palpitations and leg swelling.   Gastrointestinal: Positive for abdominal distention, abdominal pain and nausea.   Endocrine: Negative for cold intolerance and heat intolerance.   Genitourinary: Negative for difficulty urinating, dysuria, flank pain, frequency and urgency.   Musculoskeletal: Positive for back pain. Negative for arthralgias and myalgias.   Skin: Negative for color change and wound.   Allergic/Immunologic: Negative for environmental allergies and food allergies.   Neurological: Positive for weakness. Negative for dizziness and headaches.   Hematological: Does not bruise/bleed easily.   Psychiatric/Behavioral: Negative for agitation, confusion and sleep disturbance. The patient is not nervous/anxious.      Objective:     Vital Signs (Most Recent):  Temp: 98 °F (36.7 °C) (03/19/20 0937)  Pulse: 79 (03/19/20 1532)  Resp: 16 (03/19/20 1532)  BP: (!) 166/80 (03/19/20 1532)  SpO2: 95 % (03/19/20 1532) Vital Signs (24h Range):  Temp:  [97 °F (36.1 °C)-98 °F (36.7 °C)] 98 °F (36.7 °C)  Pulse:  [74-92] 79  Resp:  [10-20] 16  SpO2:  [95 %-98 %] 95 %  BP: (161-199)/(75-95) 166/80     Weight: 75.9 kg (167 lb 6.4 oz)  Body mass index is 24.02 kg/m².    Physical Exam   Constitutional: He is oriented to person, place, and time. He appears well-developed and well-nourished.   HENT:   Head: Normocephalic.   Nose: Nose normal.   Mouth/Throat: Oropharynx is clear and moist.   Eyes: Conjunctivae are normal.   Neck: Normal range of motion. Neck supple.   Cardiovascular: Normal rate, regular rhythm and normal heart  sounds.   Pulmonary/Chest: Effort normal and breath sounds normal. No respiratory distress. He has no wheezes. He has no rales.   Abdominal: Soft. Bowel sounds are normal. He exhibits distension. There is tenderness.   Genitourinary:   Genitourinary Comments: Deferred    Musculoskeletal: Normal range of motion. He exhibits no edema.   Neurological: He is alert and oriented to person, place, and time.   Skin: Skin is warm and dry.   Psychiatric: He has a normal mood and affect. His behavior is normal.           Significant Labs:   CBC:   Recent Labs   Lab 03/18/20 2121 03/19/20  1027   WBC 6.81 5.81   HGB 12.7* 13.1*   HCT 38.8* 41.0    229     CMP:   Recent Labs   Lab 03/18/20 2121 03/19/20  1027    138   K 3.8 4.6   CL 96 97   CO2 26 28   GLU 90 87   BUN 8 8   CREATININE 0.8 0.8   CALCIUM 9.7 10.0   PROT 7.4 7.3   ALBUMIN 3.4* 3.3*   BILITOT 2.1* 2.4*   ALKPHOS 259* 316*   AST 96* 128*   ALT 32 44   ANIONGAP 14 13   EGFRNONAA >60 >60       Significant Imaging:   Imaging Results          CT Abdomen Pelvis With Contrast (Final result)  Result time 03/19/20 12:53:09    Final result by Ar Ham MD (03/19/20 12:53:09)                 Impression:      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  No bowel obstruction or evidence for perforation or abscess.    See additional findings above.    All CT scans at this facility use dose modulation, iterative reconstruction, and/or weight based dosing when appropriate to reduce radiation dose to as low as reasonable achievable.      Electronically signed by: Ar Ham MD  Date:    03/19/2020  Time:    12:53             Narrative:    EXAMINATION:  CT ABDOMEN PELVIS WITH CONTRAST    CLINICAL HISTORY:  Abdominal distension;    TECHNIQUE:  Low dose axial images, sagittal and coronal reformations were obtained from the lung bases to the pubic symphysis following the IV  administration of 75 mL of Omnipaque 350.  Oral contrast was not administered.    COMPARISON:  03/07/2020    FINDINGS:  Heart size is normal.  Coronary artery disease.  No pericardial effusion.  Five and 7 mm nodules within the right lower lobe appears similar to prior exam.    Liver demonstrates heterogeneous attenuation throughout.  Extensive infiltrative hepatocellular carcinoma with portal vein tumor thrombus again suspected.  Moderate amount of perihepatic and perisplenic ascites in fluid down the bilateral pericolic gutters.  Again lymphadenopathy in the portacaval region likely reflecting metastatic disease with possible conglomerate of node measuring up to 4.8 x 3.2 cm.  Gastroesophageal varices are noted.    No evidence of bowel obstruction.  Mild mucosal thickening throughout the small and large bowel likely reflects portal enteropathy and colopathy.  Scattered diverticuli seen throughout the descending and sigmoid colon.  No evidence of appendicitis    Kidneys are normal in size with renal cortical thinning.  No hydronephrosis.    No free air or organized fluid collection.  Small fat containing umbilical hernia.  Enlarged retroperitoneal nodes, largest anterior to the aorta measuring 1.6 cm appears stable.    Small bilateral fat containing inguinal hernias.  Fluid is seen entering the right inguinal hernia.    Postoperative changes are seen within the lumbar spine.  No definite lytic or blastic abnormalities.  No acute fracture.  Diffuse osteopenia noted.                               US Abdomen Limited (Final result)  Result time 03/19/20 12:52:54    Final result by Ko Guzman MD (03/19/20 12:52:54)                 Impression:      Heterogeneity of the liver parenchyma in keeping with known hepatocellular carcinoma.  Nodular contour compatible with cirrhosis.    Thrombosed portal vein and portal vein branches with cavernous transformation at the sylvia hepatis.    Nonspecific, heterogeneous  appearance of the partially visualized pancreas.  No focal abnormality.    Gallbladder wall thickening, likely related to intrinsic hepatic disease.  No calcified stones or sonographic evidence of acute cholecystitis.    Splenomegaly and trace ascites.    Electronically signed by resident: Ko Guzman  Date:    03/19/2020  Time:    12:35    Electronically signed by: Ko Guzman  Date:    03/19/2020  Time:    12:52             Narrative:    EXAMINATION:  US ABDOMEN LIMITED    CLINICAL HISTORY:  RUQ/Pancreas;.    TECHNIQUE:  Limited ultrasound of the right upper quadrant of the abdomen including pancreas, liver, gallbladder, common bile duct was performed.    COMPARISON:  CT abdomen pelvis 03/07/2020 with priors    FINDINGS:  Liver: Nodular contour compatible with cirrhosis.  Significant heterogeneity of the liver parenchyma compatible with previously identified hepatocellular carcinoma.  Thrombosed portal vein and portal vein branches.  Cavernous transformation with small patent vessels in the syliva hepatis.    Gallbladder: Wall thickening measuring up to 0.5 cm.  No associated hypervascularity.  No calcified stones.  Sonographic Suazo's sign is negative.    Biliary system: The common duct is not dilated, measuring 5 mm.    Spleen: Enlarged in size measuring 14.1 cm.    Pancreas: Only partially visualized.  Visualized portions demonstrate mild heterogeneity of the parenchyma.    Miscellaneous: Trace volume perihepatic and perisplenic ascites.                              Assessment/Plan:     Neoplasm related pain  -Palliative care consulted   -analgesia as needed       Anorexia  -increased oral intake encouraged  -Nutrition consult  -will hold appetite stimulants       Encounter for palliative care  -Palliative care consulted for pain control in the setting of HCC   -Full code  -Surrogate Decision Maker: Maddy (wife) at [phone]      Acute pancreatitis  -not likely as imaging negative and Lipase mildly  elevated   -will monitor  -gentle hydration  -repeat Lipase in am       Intractable nausea and vomiting  -antiemetics as needed   -gentle hydration       Severe malnutrition  -will consult Nutrition   -oral intake encouraged   -appetite stimulants held- will resume when appropriate       Essential hypertension  -Lopressor continued   -elevated in the setting of pain         Hepatocellular carcinoma  -analgesia as needed   -antiemetics as needed   -Heme/Onc consulted-pt last seen by Dr. Benoit   -Palliative Care consulted   -CT of abdomen/pelvis results consistent with extensive infiltrative hepatocellular carcinoma with portal vein tumor thrombus, ascites, and portacaval lymphadenopathy.  No bowel obstruction or evidence for perforation or abscess.  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

## 2020-03-19 NOTE — ED PROVIDER NOTES
SCRIBE #1 NOTE: I, Douglas Salas, erica scribing for, and in the presence of, Jesus Castrejon MD. I have scribed the entire note.      History      Chief Complaint   Patient presents with    Abdominal Pain     pt c/o abd pain and nausea since last night, pt was seen in the ED last night and sent home and states he felt bad again this morning       Review of patient's allergies indicates:   Allergen Reactions    Penicillins Hives and Rash    Shellfish containing products      Pt states face and hands turn blotchy red and itch        HPI   HPI    3/19/2020, 10:17 AM   History obtained from the patient      History of Present Illness: Ottoniel Arellano Jr. is a 79 y.o. male patient with a PMHx of HCC, CAD who presents to the Emergency Department for generalized abdominal pain, onset just PTA. Pt presented to the ED last night for similar sxs. Symptoms are constant and moderate in severity. No mitigating or exacerbating factors reported. Associated sxs include nausea and abdominal distention. Patient denies any fever, chills, vomiting, SOB, CP, weakness, numbness, dizziness, headache, and all other sxs at this time. No further complaints or concerns at this time.     Arrival mode: Personal vehicle     PCP: David Stevens MD       Past Medical History:  Past Medical History:   Diagnosis Date    Arthritis     Coronary artery disease     HCC (hepatocellular carcinoma)     Liver mass        Past Surgical History:  Past Surgical History:   Procedure Laterality Date    ANGIOGRAM, CORONARY, WITH LEFT HEART CATHETERIZATION      stents 12/29/2012    EYE SURGERY      right, skin graft    FLUOROSCOPY N/A 1/21/2020    Procedure: Y90 Mapping;  Surgeon: Ar Ham MD;  Location: [facility] CATH LAB;  Service: General;  Laterality: N/A;    FLUOROSCOPY N/A 2/4/2020    Procedure: TheraSphere Liver Mass;  Surgeon: Ar Ham MD;  Location: [facility] CATH LAB;  Service: General;  Laterality: N/A;    SPINAL FUSION      ACDF 05/04/2010     TONSILLECTOMY           Family History:  History reviewed. No pertinent family history.    Social History:  Social History     Tobacco Use    Smoking status: Former Smoker    Smokeless tobacco: Former User     Quit date: 5/18/1986   Substance and Sexual Activity    Alcohol use: No    Drug use: Not on file    Sexual activity: Not on file       ROS   Review of Systems   Constitutional: Negative for chills, diaphoresis, fatigue and fever.   HENT: Negative for sore throat.    Respiratory: Negative for shortness of breath.    Cardiovascular: Negative for chest pain.   Gastrointestinal: Positive for abdominal distention, abdominal pain (generalized) and nausea. Negative for vomiting.   Genitourinary: Negative for dysuria.   Musculoskeletal: Negative for back pain.   Skin: Negative for rash.   Neurological: Negative for dizziness, weakness, light-headedness, numbness and headaches.   Hematological: Does not bruise/bleed easily.   All other systems reviewed and are negative.    Physical Exam      Initial Vitals [03/19/20 0937]   BP Pulse Resp Temp SpO2   (!) 177/82 90 18 98 °F (36.7 °C) 98 %      MAP       --          Physical Exam  Nursing Notes and Vital Signs Reviewed.  Constitutional: Patient is in no acute distress. Well-developed and well-nourished.  Head: Atraumatic. Normocephalic.  Eyes: PERRL. EOM intact. Conjunctivae are not pale. No scleral icterus.  ENT: Mucous membranes are moist. Oropharynx is clear and symmetric.    Neck: Supple. Full ROM. No lymphadenopathy.  Cardiovascular: Regular rate. Regular rhythm. No murmurs, rubs, or gallops. Distal pulses are 2+ and symmetric.  Pulmonary/Chest: No respiratory distress. Clear to auscultation bilaterally. No wheezing or rales.  Abdominal: Mild distention. No fluid wave. There is diffuse tenderness.  No rebound, guarding, or rigidity.   Musculoskeletal: Moves all extremities. No obvious deformities. No edema.  Skin: Warm and dry.  Neurological:  Alert, awake,  "and appropriate.  Normal speech.  No acute focal neurological deficits are appreciated.  Psychiatric: Normal affect. Good eye contact. Appropriate in content.    ED Course    Procedures  ED Vital Signs:  Vitals:    03/19/20 0937 03/19/20 1041 03/19/20 1129 03/19/20 1201   BP: (!) 177/82  (!) 182/84 (!) 169/81   Pulse: 90 (S) 78 85 85   Resp: 18  15 13   Temp: 98 °F (36.7 °C)      TempSrc: Oral      SpO2: 98%  96% 96%   Weight:       Height: 5' 10" (1.778 m)       03/19/20 1301 03/19/20 1332 03/19/20 1428   BP: (!) 174/80 (!) 164/75    Pulse: 74 77    Resp: 10 10    Temp:      TempSrc:      SpO2: 95% 95%    Weight:   75.9 kg (167 lb 6.4 oz)   Height:          Abnormal Lab Results:  Labs Reviewed   CBC W/ AUTO DIFFERENTIAL - Abnormal; Notable for the following components:       Result Value    Hemoglobin 13.1 (*)     RDW 18.6 (*)     Lymph # 0.5 (*)     Gran% 79.0 (*)     Lymph% 9.1 (*)     All other components within normal limits   COMPREHENSIVE METABOLIC PANEL - Abnormal; Notable for the following components:    Albumin 3.3 (*)     Total Bilirubin 2.4 (*)     Alkaline Phosphatase 316 (*)      (*)     All other components within normal limits   LIPASE - Abnormal; Notable for the following components:    Lipase 130 (*)     All other components within normal limits   B-TYPE NATRIURETIC PEPTIDE - Abnormal; Notable for the following components:     (*)     All other components within normal limits   URINALYSIS, REFLEX TO URINE CULTURE - Abnormal; Notable for the following components:    Ketones, UA Trace (*)     Bilirubin (UA) 2+ (*)     Urobilinogen, UA 4.0-6.0 (*)     All other components within normal limits    Narrative:     Preferred Collection Type->Urine, Clean Catch   LACTIC ACID, PLASMA - Abnormal; Notable for the following components:    Lactate (Lactic Acid) 2.3 (*)     All other components within normal limits   CULTURE, BLOOD   CULTURE, BLOOD   TROPONIN I   PROTIME-INR   APTT        All Lab " Results:  Results for orders placed or performed during the hospital encounter of 03/19/20   CBC auto differential   Result Value Ref Range    WBC 5.81 3.90 - 12.70 K/uL    RBC 4.76 4.60 - 6.20 M/uL    Hemoglobin 13.1 (L) 14.0 - 18.0 g/dL    Hematocrit 41.0 40.0 - 54.0 %    Mean Corpuscular Volume 86 82 - 98 fL    Mean Corpuscular Hemoglobin 27.5 27.0 - 31.0 pg    Mean Corpuscular Hemoglobin Conc 32.0 32.0 - 36.0 g/dL    RDW 18.6 (H) 11.5 - 14.5 %    Platelets 229 150 - 350 K/uL    MPV 9.8 9.2 - 12.9 fL    Immature Granulocytes 0.5 0.0 - 0.5 %    Gran # (ANC) 4.6 1.8 - 7.7 K/uL    Immature Grans (Abs) 0.03 0.00 - 0.04 K/uL    Lymph # 0.5 (L) 1.0 - 4.8 K/uL    Mono # 0.6 0.3 - 1.0 K/uL    Eos # 0.1 0.0 - 0.5 K/uL    Baso # 0.05 0.00 - 0.20 K/uL    nRBC 0 0 /100 WBC    Gran% 79.0 (H) 38.0 - 73.0 %    Lymph% 9.1 (L) 18.0 - 48.0 %    Mono% 9.6 4.0 - 15.0 %    Eosinophil% 0.9 0.0 - 8.0 %    Basophil% 0.9 0.0 - 1.9 %    Differential Method Automated    Comprehensive metabolic panel   Result Value Ref Range    Sodium 138 136 - 145 mmol/L    Potassium 4.6 3.5 - 5.1 mmol/L    Chloride 97 95 - 110 mmol/L    CO2 28 23 - 29 mmol/L    Glucose 87 70 - 110 mg/dL    BUN, Bld 8 8 - 23 mg/dL    Creatinine 0.8 0.5 - 1.4 mg/dL    Calcium 10.0 8.7 - 10.5 mg/dL    Total Protein 7.3 6.0 - 8.4 g/dL    Albumin 3.3 (L) 3.5 - 5.2 g/dL    Total Bilirubin 2.4 (H) 0.1 - 1.0 mg/dL    Alkaline Phosphatase 316 (H) 55 - 135 U/L     (H) 10 - 40 U/L    ALT 44 10 - 44 U/L    Anion Gap 13 8 - 16 mmol/L    eGFR if African American >60 >60 mL/min/1.73 m^2    eGFR if non African American >60 >60 mL/min/1.73 m^2   Lipase   Result Value Ref Range    Lipase 130 (H) 4 - 60 U/L   Brain natriuretic peptide   Result Value Ref Range     (H) 0 - 99 pg/mL   Troponin I   Result Value Ref Range    Troponin I <0.006 0.000 - 0.026 ng/mL   Urinalysis, Reflex to Urine Culture Urine, Clean Catch   Result Value Ref Range    Specimen UA Urine, Clean Catch      Color, UA Yellow Yellow, Straw, Angy    Appearance, UA Clear Clear    pH, UA 6.0 5.0 - 8.0    Specific Gravity, UA 1.020 1.005 - 1.030    Protein, UA Negative Negative    Glucose, UA Negative Negative    Ketones, UA Trace (A) Negative    Bilirubin (UA) 2+ (A) Negative    Occult Blood UA Negative Negative    Nitrite, UA Negative Negative    Urobilinogen, UA 4.0-6.0 (A) <2.0 EU/dL    Leukocytes, UA Negative Negative   Protime-INR   Result Value Ref Range    Prothrombin Time 12.4 9.0 - 12.5 sec    INR 1.2 0.8 - 1.2   APTT   Result Value Ref Range    aPTT 23.9 21.0 - 32.0 sec   Lactic acid, plasma   Result Value Ref Range    Lactate (Lactic Acid) 2.3 (H) 0.5 - 2.2 mmol/L     Imaging Results:  Imaging Results          CT Abdomen Pelvis With Contrast (Final result)  Result time 03/19/20 12:53:09    Final result by Ar Ham MD (03/19/20 12:53:09)                 Impression:      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  No bowel obstruction or evidence for perforation or abscess.    See additional findings above.    All CT scans at this facility use dose modulation, iterative reconstruction, and/or weight based dosing when appropriate to reduce radiation dose to as low as reasonable achievable.      Electronically signed by: Ar Ham MD  Date:    03/19/2020  Time:    12:53             Narrative:    EXAMINATION:  CT ABDOMEN PELVIS WITH CONTRAST    CLINICAL HISTORY:  Abdominal distension;    TECHNIQUE:  Low dose axial images, sagittal and coronal reformations were obtained from the lung bases to the pubic symphysis following the IV administration of 75 mL of Omnipaque 350.  Oral contrast was not administered.    COMPARISON:  03/07/2020    FINDINGS:  Heart size is normal.  Coronary artery disease.  No pericardial effusion.  Five and 7 mm nodules within the right lower lobe appears similar to prior exam.    Liver demonstrates  heterogeneous attenuation throughout.  Extensive infiltrative hepatocellular carcinoma with portal vein tumor thrombus again suspected.  Moderate amount of perihepatic and perisplenic ascites in fluid down the bilateral pericolic gutters.  Again lymphadenopathy in the portacaval region likely reflecting metastatic disease with possible conglomerate of node measuring up to 4.8 x 3.2 cm.  Gastroesophageal varices are noted.    No evidence of bowel obstruction.  Mild mucosal thickening throughout the small and large bowel likely reflects portal enteropathy and colopathy.  Scattered diverticuli seen throughout the descending and sigmoid colon.  No evidence of appendicitis    Kidneys are normal in size with renal cortical thinning.  No hydronephrosis.    No free air or organized fluid collection.  Small fat containing umbilical hernia.  Enlarged retroperitoneal nodes, largest anterior to the aorta measuring 1.6 cm appears stable.    Small bilateral fat containing inguinal hernias.  Fluid is seen entering the right inguinal hernia.    Postoperative changes are seen within the lumbar spine.  No definite lytic or blastic abnormalities.  No acute fracture.  Diffuse osteopenia noted.                               US Abdomen Limited (Final result)  Result time 03/19/20 12:52:54    Final result by Ko Guzman MD (03/19/20 12:52:54)                 Impression:      Heterogeneity of the liver parenchyma in keeping with known hepatocellular carcinoma.  Nodular contour compatible with cirrhosis.    Thrombosed portal vein and portal vein branches with cavernous transformation at the sylvia hepatis.    Nonspecific, heterogeneous appearance of the partially visualized pancreas.  No focal abnormality.    Gallbladder wall thickening, likely related to intrinsic hepatic disease.  No calcified stones or sonographic evidence of acute cholecystitis.    Splenomegaly and trace ascites.    Electronically signed by resident: Ko  Thomas  Date:    03/19/2020  Time:    12:35    Electronically signed by: Ko Thomas  Date:    03/19/2020  Time:    12:52             Narrative:    EXAMINATION:  US ABDOMEN LIMITED    CLINICAL HISTORY:  RUQ/Pancreas;.    TECHNIQUE:  Limited ultrasound of the right upper quadrant of the abdomen including pancreas, liver, gallbladder, common bile duct was performed.    COMPARISON:  CT abdomen pelvis 03/07/2020 with priors    FINDINGS:  Liver: Nodular contour compatible with cirrhosis.  Significant heterogeneity of the liver parenchyma compatible with previously identified hepatocellular carcinoma.  Thrombosed portal vein and portal vein branches.  Cavernous transformation with small patent vessels in the sylvia hepatis.    Gallbladder: Wall thickening measuring up to 0.5 cm.  No associated hypervascularity.  No calcified stones.  Sonographic Suazo's sign is negative.    Biliary system: The common duct is not dilated, measuring 5 mm.    Spleen: Enlarged in size measuring 14.1 cm.    Pancreas: Only partially visualized.  Visualized portions demonstrate mild heterogeneity of the parenchyma.    Miscellaneous: Trace volume perihepatic and perisplenic ascites.                               The EKG was ordered, reviewed, and independently interpreted by the ED provider.  Interpretation time: 10:41  Rate: 78 BPM  Rhythm: Sinus rhythm with occasional premature ventricular complexes.  Interpretation: Nonspecific ST abnormality. No STEMI.           The Emergency Provider reviewed the vital signs and test results, which are outlined above.    ED Discussion     2:41 PM: Discussed case with Steph Banks NP ([facility]). Dr. Briseno agrees with current care and management of pt and accepts admission.   Admitting Service: Hospital Medicine  Admitting Physician: Dr. Briseno  Admit to: Med Surg    2:42 PM: Re-evaluated pt. I have discussed test results, shared treatment plan, and the need for admission with patient and  family at bedside. Pt and family express understanding at this time and agree with all information. All questions answered. Pt and family have no further questions or concerns at this time. Pt is ready for admit.         ED Medication(s):  Medications   morphine injection 4 mg (has no administration in time range)   morphine injection 8 mg (8 mg Intravenous Given 3/19/20 1053)   ondansetron injection 4 mg (4 mg Intravenous Given 3/19/20 1053)   cefTRIAXone (ROCEPHIN) 2 g in dextrose 5 % 50 mL IVPB (0 g Intravenous Stopped 3/19/20 1339)   lactated ringers bolus 1,000 mL (0 mLs Intravenous Stopped 3/19/20 1435)   iohexoL (OMNIPAQUE 350) injection 75 mL (75 mLs Intravenous Given 3/19/20 1234)          New Prescriptions    No medications on file         Medical Decision Making    Medical Decision Making:   Clinical Tests:   Lab Tests: Ordered and Reviewed  Radiological Study: Ordered and Reviewed  Medical Tests: Ordered and Reviewed           Scribe Attestation:   Scribe #1: I performed the above scribed service and the documentation accurately describes the services I performed. I attest to the accuracy of the note.    Attending:   Physician Attestation Statement for Scribe #1: I, Jesus Castrejon MD, personally performed the services described in this documentation, as scribed by Douglas Salas, in my presence, and it is both accurate and complete.          Clinical Impression       ICD-10-CM ICD-9-CM   1. Acute pancreatitis, unspecified complication status, unspecified pancreatitis type K85.90 577.0   2. Abdominal pain R10.9 789.00   3. History of hepatocellular carcinoma Z85.05 V10.07   4. Non-intractable vomiting with nausea, unspecified vomiting type R11.2 787.01       Disposition:   Disposition: Admitted  Condition: Fair         Jesus Castrejon MD  03/19/20 5234

## 2020-03-19 NOTE — CONSULTS
[facility] -   Hematology/Oncology  Consult Note    Patient Name: Ottoniel Arellano Jr.  MRN: [id number]  Admission Date: 3/19/2020  Hospital Length of Stay: 0 days  Code Status: Full Code   Attending Provider: Pepito Briseno MD  Consulting Provider: Angel Benoit MD  Primary Care Physician: David Stevens MD  Principal Problem:<principal problem not specified>    Consults  Subjective:     HPI:  Mr. Ottoniel Arellano is a 80 yo gentleman with upper abdominal pain, workup in July 2019 showed portal vein thrombosis and right liver mass, elevated AFP.  Had liver mass biopsy which showed fibrosis.  Was treated with radioembolization. Laboratory studies demonstrate rapid increase in alpha fetoprotein CT chest abdomen pelvis demonstrates metastatic disease and retroperitoneal lymphadenopathy outside of liver.    Started on Opdivo for clinical progression c/w HCC.     Presented to ER with uncontrolled pain.  Lipase elevated 130.        Oncology Treatment Plan:   OP NIVOLUMAB Q2W    Medications:  Continuous Infusions:  Scheduled Meds:   fentaNYL  1 patch Transdermal Q72H    [START ON 3/20/2020] metoprolol succinate  50 mg Oral Daily    olanzapine zydis  5 mg Oral QHS    ondansetron  8 mg Intravenous Q6H    [START ON 3/20/2020] pantoprazole  40 mg Oral Daily    tamsulosin  0.4 mg Oral QHS     PRN Meds:morphine, promethazine     Review of patient's allergies indicates:   Allergen Reactions    Penicillins Hives and Rash    Shellfish containing products      Pt states face and hands turn blotchy red and itch        Past Medical History:   Diagnosis Date    Arthritis     Coronary artery disease     HCC (hepatocellular carcinoma)     Liver mass      Past Surgical History:   Procedure Laterality Date    ANGIOGRAM, CORONARY, WITH LEFT HEART CATHETERIZATION      stents 12/29/2012    EYE SURGERY      right, skin graft    FLUOROSCOPY N/A 1/21/2020    Procedure: Y90 Mapping;  Surgeon: Ar Ham MD;  Location: [facility]  CATH LAB;  Service: General;  Laterality: N/A;    FLUOROSCOPY N/A 2/4/2020    Procedure: TheraSphere Liver Mass;  Surgeon: Ar Ham MD;  Location: [facility] CATH LAB;  Service: General;  Laterality: N/A;    SPINAL FUSION      ACDF 05/04/2010    TONSILLECTOMY       Family History     None        Tobacco Use    Smoking status: Former Smoker    Smokeless tobacco: Former User     Quit date: 5/18/1986   Substance and Sexual Activity    Alcohol use: No    Drug use: Not on file    Sexual activity: Not on file       Review of Systems   Constitutional: Negative.    HENT: Negative.    Eyes: Negative.    Respiratory: Negative.    Cardiovascular: Negative.    Gastrointestinal: Positive for abdominal distention and abdominal pain.   Endocrine: Negative.    Genitourinary: Negative.    Musculoskeletal: Negative.    Skin: Negative.    Allergic/Immunologic: Negative.    Neurological: Negative.    Hematological: Negative.    Psychiatric/Behavioral: Negative.      Objective:     Vital Signs (Most Recent):  Temp: 98 °F (36.7 °C) (03/19/20 0937)  Pulse: 79 (03/19/20 1532)  Resp: 16 (03/19/20 1532)  BP: (!) 166/80 (03/19/20 1532)  SpO2: 95 % (03/19/20 1532) Vital Signs (24h Range):  Temp:  [97 °F (36.1 °C)-98 °F (36.7 °C)] 98 °F (36.7 °C)  Pulse:  [74-92] 79  Resp:  [10-20] 16  SpO2:  [95 %-98 %] 95 %  BP: (161-199)/(75-95) 166/80     Weight: 75.9 kg (167 lb 6.4 oz)  Body mass index is 24.02 kg/m².  Body surface area is 1.94 meters squared.    No intake or output data in the 24 hours ending 03/19/20 1809    Physical Exam   Constitutional: He is oriented to person, place, and time. He appears well-developed and well-nourished.   HENT:   Head: Normocephalic and atraumatic.   Eyes: Conjunctivae and EOM are normal.   Neck: Normal range of motion. Neck supple.   Cardiovascular: Normal rate and regular rhythm.   Pulmonary/Chest: Effort normal and breath sounds normal.   Abdominal: Soft. He exhibits distension.   Musculoskeletal:  Normal range of motion.   Neurological: He is alert and oriented to person, place, and time.   Skin: Skin is warm and dry.   Psychiatric: He has a normal mood and affect. His behavior is normal. Judgment and thought content normal.   Nursing note and vitals reviewed.      Significant Labs:   All pertinent labs from the last 24 hours have been reviewed.    Diagnostic Results:  I have reviewed all pertinent imaging results/findings within the past 24 hours.    Assessment/Plan:     Hepatocellular carcinoma  March 19, 2020  S/p 2 treatments of Opdivo.  Too early to know if effective.      Abdominal pain  March 19, 2020  Palliative care input appreciated.  Lipase 130, would monitor daily until decreases.  Can eat as tolerated for now.  Pain meds.        Thank you for your consult. I will follow-up with patient. Please contact us if you have any additional questions.    Angel Benoit MD  Hematology/Oncology  [facility]

## 2020-03-19 NOTE — CONSULTS
"Consult Note  Palliative Care      Consult Requested By: Lakisha Banks NP  Reason for Consult: Neoplasm pain and symptom management        SUBJECTIVE:     History of Present Illness:  Ottoniel Arellano Jr. is a 79 y.o. 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 Imaging extensive infiltrative HCC with portal vein tumor thrombus, ascites, and portacaval lymphadenopathy. Laboratory workup was significant for elevated lipase and elevated bilirubin. He is under the care of Palliative Care for pain and symptom management so we were consulted to assist during this admission. I met Mr. Arellano in the ED without family present due to the current visitor restrictions. He reports his pain has been uncontrolled at home and while it improved with the addition of Fentanyl he still has spikes of severe pain. He has a prescription for methadone which has not been started to date but does not know why. He also has a prescription for oxycodone 5mg and knows this is for breakthrough pain but reports he does not take this often. He reports his pain starts as nausea so typically takes his nausea medications then only takes a pain pill if both are ineffective. His understanding of his opioid regimen is limited. He describes his pain as "hurting", at best and baseline is 5/10, worst 10/10, originates in his epigastrium and radiates to his entire abdomen. The pain interferes with sleep and appetite. He reports the IV pain mediations administered in the ED (morphine 4mg and 8mg IV) were equally effective and his pain is currently at baseline which is 5/10 and tolerable. He says that his current pain is unchanged from his chronic pain however his pain regimen has been ineffective in the last week which is why he decided to come to the ED. He report food and water exacerbate the pain and denies mitigating " factors. He has not had a BM in several days and reports his PO intake is very low due to anorexia and severe nausea. We discussed the importance of a preventative bowel regimen and he would like to defer the use of suppositories and enemas at this time but is open to a PO regimen once he is cleared for PO intake. 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 He has previously completed a living will indicating his desire for FULL code but no long term mechanical ventilation in the case he has no chance of meaningful recovery. He also completed HCPOA documents listing his wife as primary and sons Az and Jason as alternates.       Past Medical History:   Diagnosis Date    Arthritis     Coronary artery disease     HCC (hepatocellular carcinoma)     Liver mass      Past Surgical History:   Procedure Laterality Date    ANGIOGRAM, CORONARY, WITH LEFT HEART CATHETERIZATION      stents 12/29/2012    EYE SURGERY      right, skin graft    FLUOROSCOPY N/A 1/21/2020    Procedure: Y90 Mapping;  Surgeon: Ar Ham MD;  Location: [facility] CATH LAB;  Service: General;  Laterality: N/A;    FLUOROSCOPY N/A 2/4/2020    Procedure: TheraSphere Liver Mass;  Surgeon: Ar Ham MD;  Location: [facility] CATH LAB;  Service: General;  Laterality: N/A;    SPINAL FUSION      ACDF 05/04/2010    TONSILLECTOMY       History reviewed. No pertinent family history.  Social History     Socioeconomic History    Marital status:      Spouse name: Not on file    Number of children: Not on file    Years of education: Not on file    Highest education level: Not on file   Occupational History    Not on file   Social Needs    Financial resource strain: Not on file    Food insecurity:     Worry: Not on file     Inability: Not on file    Transportation needs:     Medical: Not on file     Non-medical: Not on file   Tobacco Use     Smoking status: Former Smoker    Smokeless tobacco: Former User     Quit date: 5/18/1986   Substance and Sexual Activity    Alcohol use: No    Drug use: Not on file    Sexual activity: Not on file   Lifestyle    Physical activity:     Days per week: Not on file     Minutes per session: Not on file    Stress: Not on file   Relationships    Social connections:     Talks on phone: Not on file     Gets together: Not on file     Attends Congregational service: Not on file     Active member of club or organization: Not on file     Attends meetings of clubs or organizations: Not on file     Relationship status: Not on file   Other Topics Concern    Not on file   Social History Narrative    Not on file      Review of patient's allergies indicates:   Allergen Reactions    Penicillins Hives and Rash    Shellfish containing products      Pt states face and hands turn blotchy red and itch       Medications:    Current Facility-Administered Medications:     fentaNYL 75 mcg/hr 1 patch, 1 patch, Transdermal, Q72H, Agata Brown PA-C    morphine injection 4 mg, 4 mg, Intravenous, Q3H PRN, Agata Brown PA-C    olanzapine zydis disintegrating tablet 5 mg, 5 mg, Oral, QHS, Agata Brown PA-C    ondansetron injection 8 mg, 8 mg, Intravenous, Q6H PRN, Agata Brown PA-C    promethazine injection 12.5 mg, 12.5 mg, Intramuscular, Q6H PRN, Agata Brown PA-C        OBJECTIVE:   Symptom Assessment (ESAS 0-10 scale)     ESAS 0 1 2 3 4 5 6 7 8 9 10   Pain      X        Dyspnea X             Anxiety X             Nausea      X        Depression  X             Anorexia      X        Fatigue X             Insomnia X             Restlessness  X             Agitation X             Constipation    yes  Bowel Management Plan (BMP): no  Diarrhea        no  Comments:     ECOG Performance Status Grade: 3 - Confined to bed or chair 50% of waking hours    ROS:  Review of Systems   Constitutional: Positive for  activity change, appetite change and unexpected weight change. Negative for fever.   HENT: Negative for sore throat and trouble swallowing.    Respiratory: Negative for cough and shortness of breath.    Cardiovascular: Negative for chest pain and leg swelling.   Gastrointestinal: Positive for abdominal distention, abdominal pain, constipation, nausea and vomiting.   Endocrine: Negative for polydipsia, polyphagia and polyuria.   Genitourinary: Negative for difficulty urinating and dysuria.   Musculoskeletal: Positive for back pain (chronic, unchanged). Negative for gait problem.   Skin: Negative for rash.   Neurological: Positive for weakness and numbness (chronic BLE). Negative for syncope and light-headedness.   Psychiatric/Behavioral: Positive for sleep disturbance (d/t pain). Negative for confusion.       Physical Exam:  Vitals: Temp: 98 °F (36.7 °C) (03/19/20 0937)  Pulse: 79 (03/19/20 1532)  Resp: 16 (03/19/20 1532)  BP: (!) 166/80 (03/19/20 1532)  SpO2: 95 % (03/19/20 1532)    Gen: well-developed, NAD, fentanyl 50mcg patch noted to L posterior shoulder  Head: atraumatic, normocephalic  Eyes: conjuctiva and sclera clear, EOMI  Ears: hearing grossly intact with no external abnormality  Mouth: no mucositis, good dentition, MM moist  Respiratory: CTAB, no wheezing or rhonchi  Heart: RRR, no murmur  Abdomen: soft, diffusely tender without guarding or rigidity, mildly distended, normoactive BS  Pulses: 2+ in DP  Extremities: no edema, full ROM of all joints  Neurologic: no focal deficits, CN II-XII grossly intact, normal coordination  Skin: no rashes or lesions  Psych: cooperative, normal mood and affect, normal attention span and concentration, normal memory, fund of knowledge appropriate     Labs:  CBC:   WBC   Date Value Ref Range Status   03/19/2020 5.81 3.90 - 12.70 K/uL Final     Hemoglobin   Date Value Ref Range Status   03/19/2020 13.1 (L) 14.0 - 18.0 g/dL Final     Hematocrit   Date Value Ref Range Status    03/19/2020 41.0 40.0 - 54.0 % Final     Mean Corpuscular Volume   Date Value Ref Range Status   03/19/2020 86 82 - 98 fL Final     Platelets   Date Value Ref Range Status   03/19/2020 229 150 - 350 K/uL Final       BMP  Lab Results   Component Value Date     03/19/2020    K 4.6 03/19/2020    CL 97 03/19/2020    CO2 28 03/19/2020    BUN 8 03/19/2020    CREATININE 0.8 03/19/2020    CALCIUM 10.0 03/19/2020    ANIONGAP 13 03/19/2020    ESTGFRAFRICA >60 03/19/2020    EGFRNONAA >60 03/19/2020       LFT:   Lab Results   Component Value Date     (H) 03/19/2020    ALKPHOS 316 (H) 03/19/2020    BILITOT 2.4 (H) 03/19/2020       Albumin:   Albumin   Date Value Ref Range Status   03/19/2020 3.3 (L) 3.5 - 5.2 g/dL Final         Radiology:I have reviewed all pertinent imaging results/findings within the past 24 hours.  - CT abdomen/ pelvis 3/19/20:  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  No bowel obstruction or evidence for perforation or abscess.    - Abd US 3/19/20:  Heterogeneity of the liver parenchyma in keeping with known hepatocellular carcinoma.  Nodular contour compatible with cirrhosis.    Thrombosed portal vein and portal vein branches with cavernous transformation at the sylvia hepatis.    Nonspecific, heterogeneous appearance of the partially visualized pancreas.  No focal abnormality.    Gallbladder wall thickening, likely related to intrinsic hepatic disease.  No calcified stones or sonographic evidence of acute cholecystitis.    Splenomegaly and trace ascites.    ASSESSMENT   Ottoniel Arellano Jr. is a 79 y.o. 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 Imaging extensive infiltrative HCC with portal vein tumor thrombus, ascites, and portacaval lymphadenopathy. Laboratory workup was  significant for elevated lipase and elevated bilirubin. He is under the care of Palliative Care for pain and symptom management so we were consulted to assist during this admission.     PLAN   1. Neoplasm pain management  - Increase Fentanyl to 75mcg/ hr q72hr (instructed nurse to remove existing 50mcg/ hr patch)  - Morphine 4mg IV q3hr PRN pain  - Plan to rotate to PO once cleared by primary  - A record of the controlled substances prescribed and dispensed to the patient in [place] was reviewed in  by me (or my delegate). Last received Fentanyl 50mcg patches #5 on 3/11/20, methadone 5mg #60 on 3/5/20, MS ER 30mg #90 on 2/26/20, and oxycodone 5mg #90 on 2/26/20.    2. Intractable nausea  - Schedule Zofran 8mg IV q6hr  - Phenergan 12.5mg IV q6hr PRN breakthrough nausea  - Zyprexa zydis 5mg HS for sleep and nausea    3. HCC with portal vein thrombus  - Oncology consulted  - Currently on immunotherapy (nivolumab)    4. Pancreatitis?  - Imaging negative for inflammation but lipase elevated  - Denies alcohol and fatty food intake   - Defer to primary    5. Anorexia related to malignancy  - Previously prescribed Marinol however neither he nor his wife know if he is currently taking  - Since PO intake exacerbates his pain I recommend holding appetite stimulants at this time and work on pain control.     6. Encounter for Palliative Care  - Code status: FULL and does not want long term mechanical ventilation should he have no chance of meaningful recovery  - HCPOA and living will scanned into the computer  - HCPOA: wife Maddy, then sons Az and Jason as alternate  - We will arrange Palliative Care follow up at the time of discharge      Discussed case and visit details with Lakisha Banks NP.    Thank you for allowing Palliative care to be involved in the care of Ottoniel Arellano .         Medical decision making: HIGH based on high risk of death, untreated symptoms, high risk medications, poor prognosis, management  of more than one chronic illness in exacerbation, managing side effects of medications or polypharmacy.     Plan required increased review of medication choice, interaction, dosing, frequency, and route due to patient complexity. Patient complexity increased by: Current NPO status, Presence of advanced age, At risk for delirium      Agata Brown PA-C  Palliative Care

## 2020-03-19 NOTE — ASSESSMENT & PLAN NOTE
-Palliative care consulted for pain control in the setting of HCC   -Full code  -Surrogate Decision Maker: Maddy (wife) at [phone]

## 2020-03-19 NOTE — ASSESSMENT & PLAN NOTE
-not likely as imaging negative and Lipase mildly elevated   -will monitor  -gentle hydration  -repeat Lipase in am

## 2020-03-19 NOTE — HPI
Mr. Ottoniel Arellano is a 80 yo gentleman with upper abdominal pain, workup in July 2019 showed portal vein thrombosis and right liver mass, elevated AFP.  Had liver mass biopsy which showed fibrosis.  Was treated with radioembolization. Laboratory studies demonstrate rapid increase in alpha fetoprotein CT chest abdomen pelvis demonstrates metastatic disease and retroperitoneal lymphadenopathy outside of liver.    Started on Opdivo for clinical progression c/w HCC.     Presented to ER with uncontrolled pain.  Lipase elevated 130.

## 2020-03-19 NOTE — ED NOTES
US at bedside, Patient in no distress, VS normal, No pain reported. Will continue to monitor patient.

## 2020-03-19 NOTE — SUBJECTIVE & OBJECTIVE
Oncology Treatment Plan:   OP NIVOLUMAB Q2W    Medications:  Continuous Infusions:  Scheduled Meds:   fentaNYL  1 patch Transdermal Q72H    [START ON 3/20/2020] metoprolol succinate  50 mg Oral Daily    olanzapine zydis  5 mg Oral QHS    ondansetron  8 mg Intravenous Q6H    [START ON 3/20/2020] pantoprazole  40 mg Oral Daily    tamsulosin  0.4 mg Oral QHS     PRN Meds:morphine, promethazine     Review of patient's allergies indicates:   Allergen Reactions    Penicillins Hives and Rash    Shellfish containing products      Pt states face and hands turn blotchy red and itch        Past Medical History:   Diagnosis Date    Arthritis     Coronary artery disease     HCC (hepatocellular carcinoma)     Liver mass      Past Surgical History:   Procedure Laterality Date    ANGIOGRAM, CORONARY, WITH LEFT HEART CATHETERIZATION      stents 12/29/2012    EYE SURGERY      right, skin graft    FLUOROSCOPY N/A 1/21/2020    Procedure: Y90 Mapping;  Surgeon: Ar Ham MD;  Location: [facility] CATH LAB;  Service: General;  Laterality: N/A;    FLUOROSCOPY N/A 2/4/2020    Procedure: TheraSphere Liver Mass;  Surgeon: Ar Ham MD;  Location: [facility] CATH LAB;  Service: General;  Laterality: N/A;    SPINAL FUSION      ACDF 05/04/2010    TONSILLECTOMY       Family History     None        Tobacco Use    Smoking status: Former Smoker    Smokeless tobacco: Former User     Quit date: 5/18/1986   Substance and Sexual Activity    Alcohol use: No    Drug use: Not on file    Sexual activity: Not on file       Review of Systems   Constitutional: Negative.    HENT: Negative.    Eyes: Negative.    Respiratory: Negative.    Cardiovascular: Negative.    Gastrointestinal: Positive for abdominal distention and abdominal pain.   Endocrine: Negative.    Genitourinary: Negative.    Musculoskeletal: Negative.    Skin: Negative.    Allergic/Immunologic: Negative.    Neurological: Negative.    Hematological: Negative.     Psychiatric/Behavioral: Negative.      Objective:     Vital Signs (Most Recent):  Temp: 98 °F (36.7 °C) (03/19/20 0937)  Pulse: 79 (03/19/20 1532)  Resp: 16 (03/19/20 1532)  BP: (!) 166/80 (03/19/20 1532)  SpO2: 95 % (03/19/20 1532) Vital Signs (24h Range):  Temp:  [97 °F (36.1 °C)-98 °F (36.7 °C)] 98 °F (36.7 °C)  Pulse:  [74-92] 79  Resp:  [10-20] 16  SpO2:  [95 %-98 %] 95 %  BP: (161-199)/(75-95) 166/80     Weight: 75.9 kg (167 lb 6.4 oz)  Body mass index is 24.02 kg/m².  Body surface area is 1.94 meters squared.    No intake or output data in the 24 hours ending 03/19/20 1809    Physical Exam   Constitutional: He is oriented to person, place, and time. He appears well-developed and well-nourished.   HENT:   Head: Normocephalic and atraumatic.   Eyes: Conjunctivae and EOM are normal.   Neck: Normal range of motion. Neck supple.   Cardiovascular: Normal rate and regular rhythm.   Pulmonary/Chest: Effort normal and breath sounds normal.   Abdominal: Soft. He exhibits distension.   Musculoskeletal: Normal range of motion.   Neurological: He is alert and oriented to person, place, and time.   Skin: Skin is warm and dry.   Psychiatric: He has a normal mood and affect. His behavior is normal. Judgment and thought content normal.   Nursing note and vitals reviewed.      Significant Labs:   All pertinent labs from the last 24 hours have been reviewed.    Diagnostic Results:  I have reviewed all pertinent imaging results/findings within the past 24 hours.

## 2020-03-19 NOTE — ASSESSMENT & PLAN NOTE
March 19, 2020  Palliative care input appreciated.  Lipase 130, would monitor daily until decreases.  Can eat as tolerated for now.  Pain meds.

## 2020-03-19 NOTE — ASSESSMENT & PLAN NOTE
-ASA and Lopressor continued   -statin held due to elevated AST  -Troponin results negative   -BNP mildly elevated

## 2020-03-19 NOTE — HPI
Ottoniel Arellano Jr. is a 79 y.o. male patient with a PMHx of HCC, CAD Liver Mass, and Arthritis who presents to the Emergency Department for generalized abdominal pain which onset just prior to arrival. Pt presented to the ED on 3/18/2020 [facility] similar symptom presentation.  Symptoms are constant and moderate in severity. No mitigating or exacerbating factors reported. Associated sxs include weakness, back pain, fatigue, decreased oral intake, nausea and abdominal distention. Patient denies any fever, chills, vomiting, SOB, CP, weakness, numbness, dizziness, headache, and all other sxs at this time. No further complaints or concerns at this time.  Pt denies smoking and use of ETOH.  Pt reports compliance with prescribed medications but did not take them today.  Pt is a full code and Maddy Arellano (wife) at [phone] is the surrogate decision maker.  ER work up showed: , Lactic 2.3, Trace ketones, Urobilirubin 4-6, Lipase 130, , Bilirubin 2.4, and Alk Phos 316.  CT of abdomen/pelvis showed extensive infiltrative hepatocellular carcinoma with portal vein tumor thrombus, ascites, and portacaval lymphadenopathy with no bowel obstruction or evidence for perforation or abscess.  US showed heterogeneity of the liver parenchyma associated with hepatocellular carcinoma and nodular contour compatible with cirrhosis. Thrombosed portal vein and portal vein branches with cavernous transformation at the sylvia hepatis.  Nonspecific, heterogeneous appearance of the partially visualized pancreas.  No focal abnormality.  Gallbladder wall thickening, likely related to intrinsic hepatic disease.  No calcified stones or sonographic evidence of acute cholecystitis.  Splenomegaly and trace ascites. Hospital Medicine consulted for further management and pt placed on Telemetry Unit.

## 2020-03-19 NOTE — ASSESSMENT & PLAN NOTE
-analgesia as needed   -antiemetics as needed   -Heme/Onc consulted-pt last seen by Dr. Benoit   -Palliative Care consulted   -CT of abdomen/pelvis results consistent with extensive infiltrative hepatocellular carcinoma with portal vein tumor thrombus, ascites, and portacaval lymphadenopathy.  No bowel obstruction or evidence for perforation or abscess.  -US results reviewed  -Palliative Care consulted   -AST, Alk Phos, and Bilirubin elevated- repeat CMP in am   - Currently on immunotherapy (nivolumab)-S/p 2 treatments of Opdivo

## 2020-03-19 NOTE — ASSESSMENT & PLAN NOTE
-worsened in the setting of HCC and portal vein thrombosis   -Palliative care consulted to assist with pain control   -analgesia as needed   -antiemetics as needed

## 2020-03-19 NOTE — SUBJECTIVE & OBJECTIVE
Past Medical History:   Diagnosis Date    Arthritis     Coronary artery disease     HCC (hepatocellular carcinoma)     Liver mass        Past Surgical History:   Procedure Laterality Date    ANGIOGRAM, CORONARY, WITH LEFT HEART CATHETERIZATION      stents 12/29/2012    EYE SURGERY      right, skin graft    FLUOROSCOPY N/A 1/21/2020    Procedure: Y90 Mapping;  Surgeon: Ar Ham MD;  Location: [facility] CATH LAB;  Service: General;  Laterality: N/A;    FLUOROSCOPY N/A 2/4/2020    Procedure: TheraSphere Liver Mass;  Surgeon: Ar Ham MD;  Location: [facility] CATH LAB;  Service: General;  Laterality: N/A;    SPINAL FUSION      ACDF 05/04/2010    TONSILLECTOMY         Review of patient's allergies indicates:   Allergen Reactions    Penicillins Hives and Rash    Shellfish containing products      Pt states face and hands turn blotchy red and itch       Current Facility-Administered Medications on File Prior to Encounter   Medication    [COMPLETED] morphine injection 6 mg    [COMPLETED] ondansetron injection 8 mg    [DISCONTINUED] ondansetron injection 8 mg     Current Outpatient Medications on File Prior to Encounter   Medication Sig    ascorbic acid (VITAMIN C) 500 MG tablet Take 500 mg by mouth once daily.    aspirin 81 MG Chew Take 81 mg by mouth once daily.    atorvastatin (LIPITOR) 40 MG tablet Take 40 mg by mouth once daily.     cinnamon bark 500 mg capsule Take 1,000 mg by mouth once daily.    diphenhydrAMINE (BENADRYL) 25 mg capsule Take 50 mg by mouth nightly as needed for Itching.    dronabinoL (MARINOL) 5 MG capsule Take 1 capsule (5 mg total) by mouth 2 (two) times daily before meals.    fentaNYL (DURAGESIC) 50 mcg/hr Place 1 patch onto the skin every 72 hours.    fish oil-omega-3 fatty acids 300-1,000 mg capsule Take 2 g by mouth once daily.    fluticasone propionate (FLONASE ALLERGY RELIEF) 50 mcg/actuation nasal spray 1 spray by Each Nare route once daily.    meloxicam (MOBIC)  15 MG tablet Take 15 mg by mouth daily as needed for Pain.    methadone (DOLOPHINE) 5 MG tablet Take 1 tablet (5 mg total) by mouth every 6 (six) hours as needed for Pain.    metoprolol succinate (TOPROL-XL) 50 MG 24 hr tablet Take 1 tablet (50 mg total) by mouth once daily.    metoprolol tartrate (LOPRESSOR) 25 MG tablet     mirtazapine (REMERON) 7.5 MG Tab Take 1-2 tablets (7.5-15 mg total) by mouth nightly. For sleep and appetite    NEXIUM 40 mg capsule Take 40 mg by mouth daily as needed.     OLANZapine (ZYPREXA) 5 MG tablet Take 1 tablet (5 mg total) by mouth nightly as needed (nausea).    ondansetron (ZOFRAN) 8 MG tablet Take 1 tablet (8 mg total) by mouth every 8 (eight) hours as needed for Nausea.    oxyCODONE (ROXICODONE) 5 MG immediate release tablet Take 1 tablet (5 mg total) by mouth every 4 (four) hours as needed for Pain. Medically necessary for more than 7 days    prochlorperazine (COMPAZINE) 10 MG tablet Take 1 tablet (10 mg total) by mouth every 6 (six) hours as needed.    promethazine (PHENERGAN) 12.5 MG Tab Take 1 tablet (12.5 mg total) by mouth every 6 to 8 hours as needed.    tamsulosin (FLOMAX) 0.4 mg Cp24 Take 0.4 mg by mouth every evening.     vitamin D (VITAMIN D3) 1000 units Tab Take 1,000 Units by mouth.    vitamin E 400 unit Tab Take 1 tablet by mouth once daily.     Family History     None        Tobacco Use    Smoking status: Former Smoker    Smokeless tobacco: Former User     Quit date: 5/18/1986   Substance and Sexual Activity    Alcohol use: No    Drug use: Not on file    Sexual activity: Not on file     Review of Systems   Constitutional: Positive for activity change, appetite change and fatigue. Negative for fever.   HENT: Negative for congestion, sinus pressure, sore throat and trouble swallowing.    Eyes: Negative.    Respiratory: Negative for cough, shortness of breath and wheezing.    Cardiovascular: Negative for chest pain, palpitations and leg swelling.    Gastrointestinal: Positive for abdominal distention, abdominal pain and nausea.   Endocrine: Negative for cold intolerance and heat intolerance.   Genitourinary: Negative for difficulty urinating, dysuria, flank pain, frequency and urgency.   Musculoskeletal: Positive for back pain. Negative for arthralgias and myalgias.   Skin: Negative for color change and wound.   Allergic/Immunologic: Negative for environmental allergies and food allergies.   Neurological: Positive for weakness. Negative for dizziness and headaches.   Hematological: Does not bruise/bleed easily.   Psychiatric/Behavioral: Negative for agitation, confusion and sleep disturbance. The patient is not nervous/anxious.      Objective:     Vital Signs (Most Recent):  Temp: 98 °F (36.7 °C) (03/19/20 0937)  Pulse: 79 (03/19/20 1532)  Resp: 16 (03/19/20 1532)  BP: (!) 166/80 (03/19/20 1532)  SpO2: 95 % (03/19/20 1532) Vital Signs (24h Range):  Temp:  [97 °F (36.1 °C)-98 °F (36.7 °C)] 98 °F (36.7 °C)  Pulse:  [74-92] 79  Resp:  [10-20] 16  SpO2:  [95 %-98 %] 95 %  BP: (161-199)/(75-95) 166/80     Weight: 75.9 kg (167 lb 6.4 oz)  Body mass index is 24.02 kg/m².    Physical Exam   Constitutional: He is oriented to person, place, and time. He appears well-developed and well-nourished.   HENT:   Head: Normocephalic.   Nose: Nose normal.   Mouth/Throat: Oropharynx is clear and moist.   Eyes: Conjunctivae are normal.   Neck: Normal range of motion. Neck supple.   Cardiovascular: Normal rate, regular rhythm and normal heart sounds.   Pulmonary/Chest: Effort normal and breath sounds normal. No respiratory distress. He has no wheezes. He has no rales.   Abdominal: Soft. Bowel sounds are normal. He exhibits distension. There is tenderness.   Genitourinary:   Genitourinary Comments: Deferred    Musculoskeletal: Normal range of motion. He exhibits no edema.   Neurological: He is alert and oriented to person, place, and time.   Skin: Skin is warm and dry.    Psychiatric: He has a normal mood and affect. His behavior is normal.           Significant Labs:   CBC:   Recent Labs   Lab 03/18/20 2121 03/19/20  1027   WBC 6.81 5.81   HGB 12.7* 13.1*   HCT 38.8* 41.0    229     CMP:   Recent Labs   Lab 03/18/20 2121 03/19/20  1027    138   K 3.8 4.6   CL 96 97   CO2 26 28   GLU 90 87   BUN 8 8   CREATININE 0.8 0.8   CALCIUM 9.7 10.0   PROT 7.4 7.3   ALBUMIN 3.4* 3.3*   BILITOT 2.1* 2.4*   ALKPHOS 259* 316*   AST 96* 128*   ALT 32 44   ANIONGAP 14 13   EGFRNONAA >60 >60       Significant Imaging:   Imaging Results          CT Abdomen Pelvis With Contrast (Final result)  Result time 03/19/20 12:53:09    Final result by Ar Ham MD (03/19/20 12:53:09)                 Impression:      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  No bowel obstruction or evidence for perforation or abscess.    See additional findings above.    All CT scans at this facility use dose modulation, iterative reconstruction, and/or weight based dosing when appropriate to reduce radiation dose to as low as reasonable achievable.      Electronically signed by: Ar Ham MD  Date:    03/19/2020  Time:    12:53             Narrative:    EXAMINATION:  CT ABDOMEN PELVIS WITH CONTRAST    CLINICAL HISTORY:  Abdominal distension;    TECHNIQUE:  Low dose axial images, sagittal and coronal reformations were obtained from the lung bases to the pubic symphysis following the IV administration of 75 mL of Omnipaque 350.  Oral contrast was not administered.    COMPARISON:  03/07/2020    FINDINGS:  Heart size is normal.  Coronary artery disease.  No pericardial effusion.  Five and 7 mm nodules within the right lower lobe appears similar to prior exam.    Liver demonstrates heterogeneous attenuation throughout.  Extensive infiltrative hepatocellular carcinoma with portal vein tumor thrombus again suspected.  Moderate  amount of perihepatic and perisplenic ascites in fluid down the bilateral pericolic gutters.  Again lymphadenopathy in the portacaval region likely reflecting metastatic disease with possible conglomerate of node measuring up to 4.8 x 3.2 cm.  Gastroesophageal varices are noted.    No evidence of bowel obstruction.  Mild mucosal thickening throughout the small and large bowel likely reflects portal enteropathy and colopathy.  Scattered diverticuli seen throughout the descending and sigmoid colon.  No evidence of appendicitis    Kidneys are normal in size with renal cortical thinning.  No hydronephrosis.    No free air or organized fluid collection.  Small fat containing umbilical hernia.  Enlarged retroperitoneal nodes, largest anterior to the aorta measuring 1.6 cm appears stable.    Small bilateral fat containing inguinal hernias.  Fluid is seen entering the right inguinal hernia.    Postoperative changes are seen within the lumbar spine.  No definite lytic or blastic abnormalities.  No acute fracture.  Diffuse osteopenia noted.                               US Abdomen Limited (Final result)  Result time 03/19/20 12:52:54    Final result by Ko Guzman MD (03/19/20 12:52:54)                 Impression:      Heterogeneity of the liver parenchyma in keeping with known hepatocellular carcinoma.  Nodular contour compatible with cirrhosis.    Thrombosed portal vein and portal vein branches with cavernous transformation at the sylvia hepatis.    Nonspecific, heterogeneous appearance of the partially visualized pancreas.  No focal abnormality.    Gallbladder wall thickening, likely related to intrinsic hepatic disease.  No calcified stones or sonographic evidence of acute cholecystitis.    Splenomegaly and trace ascites.    Electronically signed by resident: Ko Guzman  Date:    03/19/2020  Time:    12:35    Electronically signed by: Ko Guzman  Date:    03/19/2020  Time:    12:52             Narrative:     EXAMINATION:  US ABDOMEN LIMITED    CLINICAL HISTORY:  RUQ/Pancreas;.    TECHNIQUE:  Limited ultrasound of the right upper quadrant of the abdomen including pancreas, liver, gallbladder, common bile duct was performed.    COMPARISON:  CT abdomen pelvis 03/07/2020 with priors    FINDINGS:  Liver: Nodular contour compatible with cirrhosis.  Significant heterogeneity of the liver parenchyma compatible with previously identified hepatocellular carcinoma.  Thrombosed portal vein and portal vein branches.  Cavernous transformation with small patent vessels in the sylvia hepatis.    Gallbladder: Wall thickening measuring up to 0.5 cm.  No associated hypervascularity.  No calcified stones.  Sonographic Suazo's sign is negative.    Biliary system: The common duct is not dilated, measuring 5 mm.    Spleen: Enlarged in size measuring 14.1 cm.    Pancreas: Only partially visualized.  Visualized portions demonstrate mild heterogeneity of the parenchyma.    Miscellaneous: Trace volume perihepatic and perisplenic ascites.

## 2020-03-20 LAB
ALBUMIN SERPL BCP-MCNC: 2.9 G/DL (ref 3.5–5.2)
ALP SERPL-CCNC: 322 U/L (ref 55–135)
ALT SERPL W/O P-5'-P-CCNC: 45 U/L (ref 10–44)
ANION GAP SERPL CALC-SCNC: 13 MMOL/L (ref 8–16)
AST SERPL-CCNC: 128 U/L (ref 10–40)
BASOPHILS # BLD AUTO: 0.03 K/UL (ref 0–0.2)
BASOPHILS NFR BLD: 0.7 % (ref 0–1.9)
BILIRUB SERPL-MCNC: 1.6 MG/DL (ref 0.1–1)
BUN SERPL-MCNC: 7 MG/DL (ref 8–23)
CALCIUM SERPL-MCNC: 9.5 MG/DL (ref 8.7–10.5)
CHLORIDE SERPL-SCNC: 98 MMOL/L (ref 95–110)
CO2 SERPL-SCNC: 27 MMOL/L (ref 23–29)
CREAT SERPL-MCNC: 0.7 MG/DL (ref 0.5–1.4)
DIFFERENTIAL METHOD: ABNORMAL
EOSINOPHIL # BLD AUTO: 0.1 K/UL (ref 0–0.5)
EOSINOPHIL NFR BLD: 1.7 % (ref 0–8)
ERYTHROCYTE [DISTWIDTH] IN BLOOD BY AUTOMATED COUNT: 18.6 % (ref 11.5–14.5)
EST. GFR  (AFRICAN AMERICAN): >60 ML/MIN/1.73 M^2
EST. GFR  (NON AFRICAN AMERICAN): >60 ML/MIN/1.73 M^2
GLUCOSE SERPL-MCNC: 69 MG/DL (ref 70–110)
HCT VFR BLD AUTO: 38.9 % (ref 40–54)
HGB BLD-MCNC: 12.4 G/DL (ref 14–18)
IMM GRANULOCYTES # BLD AUTO: 0.01 K/UL (ref 0–0.04)
IMM GRANULOCYTES NFR BLD AUTO: 0.2 % (ref 0–0.5)
LACTATE SERPL-SCNC: 2 MMOL/L (ref 0.5–2.2)
LIPASE SERPL-CCNC: 69 U/L (ref 4–60)
LYMPHOCYTES # BLD AUTO: 0.7 K/UL (ref 1–4.8)
LYMPHOCYTES NFR BLD: 17.4 % (ref 18–48)
MCH RBC QN AUTO: 27.9 PG (ref 27–31)
MCHC RBC AUTO-ENTMCNC: 31.9 G/DL (ref 32–36)
MCV RBC AUTO: 87 FL (ref 82–98)
MONOCYTES # BLD AUTO: 0.5 K/UL (ref 0.3–1)
MONOCYTES NFR BLD: 11.8 % (ref 4–15)
NEUTROPHILS # BLD AUTO: 2.8 K/UL (ref 1.8–7.7)
NEUTROPHILS NFR BLD: 68.2 % (ref 38–73)
NRBC BLD-RTO: 0 /100 WBC
PLATELET # BLD AUTO: 184 K/UL (ref 150–350)
PMV BLD AUTO: 9.7 FL (ref 9.2–12.9)
POTASSIUM SERPL-SCNC: 4.2 MMOL/L (ref 3.5–5.1)
PROT SERPL-MCNC: 6.6 G/DL (ref 6–8.4)
RBC # BLD AUTO: 4.45 M/UL (ref 4.6–6.2)
SODIUM SERPL-SCNC: 138 MMOL/L (ref 136–145)
WBC # BLD AUTO: 4.08 K/UL (ref 3.9–12.7)

## 2020-03-20 PROCEDURE — 63600175 PHARM REV CODE 636 W HCPCS: Performed by: PHYSICIAN ASSISTANT

## 2020-03-20 PROCEDURE — 99233 PR SUBSEQUENT HOSPITAL CARE,LEVL III: ICD-10-PCS | Mod: ,,, | Performed by: INTERNAL MEDICINE

## 2020-03-20 PROCEDURE — 63600175 PHARM REV CODE 636 W HCPCS: Performed by: NURSE PRACTITIONER

## 2020-03-20 PROCEDURE — 36415 COLL VENOUS BLD VENIPUNCTURE: CPT

## 2020-03-20 PROCEDURE — 83605 ASSAY OF LACTIC ACID: CPT

## 2020-03-20 PROCEDURE — 25000003 PHARM REV CODE 250: Performed by: PHYSICIAN ASSISTANT

## 2020-03-20 PROCEDURE — 25000003 PHARM REV CODE 250: Performed by: NURSE PRACTITIONER

## 2020-03-20 PROCEDURE — 99233 PR SUBSEQUENT HOSPITAL CARE,LEVL III: ICD-10-PCS | Mod: ,,, | Performed by: PHYSICIAN ASSISTANT

## 2020-03-20 PROCEDURE — 99233 SBSQ HOSP IP/OBS HIGH 50: CPT | Mod: ,,, | Performed by: PHYSICIAN ASSISTANT

## 2020-03-20 PROCEDURE — 80053 COMPREHEN METABOLIC PANEL: CPT

## 2020-03-20 PROCEDURE — 83690 ASSAY OF LIPASE: CPT

## 2020-03-20 PROCEDURE — 21400001 HC TELEMETRY ROOM

## 2020-03-20 PROCEDURE — 99233 SBSQ HOSP IP/OBS HIGH 50: CPT | Mod: ,,, | Performed by: INTERNAL MEDICINE

## 2020-03-20 PROCEDURE — 85025 COMPLETE CBC W/AUTO DIFF WBC: CPT

## 2020-03-20 RX ORDER — OXYCODONE HYDROCHLORIDE 5 MG/1
10 TABLET ORAL EVERY 4 HOURS PRN
Status: DISCONTINUED | OUTPATIENT
Start: 2020-03-20 | End: 2020-03-21 | Stop reason: HOSPADM

## 2020-03-20 RX ORDER — HYDRALAZINE HYDROCHLORIDE 20 MG/ML
10 INJECTION INTRAMUSCULAR; INTRAVENOUS EVERY 6 HOURS PRN
Status: DISCONTINUED | OUTPATIENT
Start: 2020-03-20 | End: 2020-03-21 | Stop reason: HOSPADM

## 2020-03-20 RX ORDER — POLYETHYLENE GLYCOL 3350 17 G/17G
17 POWDER, FOR SOLUTION ORAL DAILY
Status: DISCONTINUED | OUTPATIENT
Start: 2020-03-20 | End: 2020-03-21 | Stop reason: HOSPADM

## 2020-03-20 RX ORDER — MORPHINE SULFATE 4 MG/ML
4 INJECTION, SOLUTION INTRAMUSCULAR; INTRAVENOUS EVERY 4 HOURS PRN
Status: DISCONTINUED | OUTPATIENT
Start: 2020-03-20 | End: 2020-03-21 | Stop reason: HOSPADM

## 2020-03-20 RX ORDER — ONDANSETRON 8 MG/1
8 TABLET, ORALLY DISINTEGRATING ORAL EVERY 8 HOURS
Status: DISCONTINUED | OUTPATIENT
Start: 2020-03-20 | End: 2020-03-21 | Stop reason: HOSPADM

## 2020-03-20 RX ORDER — METOPROLOL SUCCINATE 50 MG/1
50 TABLET, EXTENDED RELEASE ORAL DAILY
Status: DISCONTINUED | OUTPATIENT
Start: 2020-03-20 | End: 2020-03-21 | Stop reason: HOSPADM

## 2020-03-20 RX ORDER — PROMETHAZINE HYDROCHLORIDE 25 MG/ML
25 INJECTION, SOLUTION INTRAMUSCULAR; INTRAVENOUS EVERY 6 HOURS PRN
Status: DISCONTINUED | OUTPATIENT
Start: 2020-03-20 | End: 2020-03-21 | Stop reason: HOSPADM

## 2020-03-20 RX ORDER — AMLODIPINE BESYLATE 10 MG/1
10 TABLET ORAL DAILY
Status: DISCONTINUED | OUTPATIENT
Start: 2020-03-20 | End: 2020-03-21 | Stop reason: HOSPADM

## 2020-03-20 RX ORDER — PROCHLORPERAZINE MALEATE 5 MG
5 TABLET ORAL 3 TIMES DAILY
Status: DISCONTINUED | OUTPATIENT
Start: 2020-03-20 | End: 2020-03-21 | Stop reason: HOSPADM

## 2020-03-20 RX ADMIN — POLYETHYLENE GLYCOL 3350 17 G: 17 POWDER, FOR SOLUTION ORAL at 12:03

## 2020-03-20 RX ADMIN — ONDANSETRON 8 MG: 2 INJECTION INTRAMUSCULAR; INTRAVENOUS at 11:03

## 2020-03-20 RX ADMIN — HYDRALAZINE HYDROCHLORIDE 10 MG: 20 INJECTION, SOLUTION INTRAMUSCULAR; INTRAVENOUS at 07:03

## 2020-03-20 RX ADMIN — PROMETHAZINE HYDROCHLORIDE 12.5 MG: 25 INJECTION INTRAMUSCULAR; INTRAVENOUS at 07:03

## 2020-03-20 RX ADMIN — ONDANSETRON 8 MG: 2 INJECTION INTRAMUSCULAR; INTRAVENOUS at 05:03

## 2020-03-20 RX ADMIN — PROCHLORPERAZINE MALEATE 5 MG: 5 TABLET ORAL at 06:03

## 2020-03-20 RX ADMIN — TAMSULOSIN HYDROCHLORIDE 0.4 MG: 0.4 CAPSULE ORAL at 08:03

## 2020-03-20 RX ADMIN — PROCHLORPERAZINE MALEATE 5 MG: 5 TABLET ORAL at 08:03

## 2020-03-20 RX ADMIN — OLANZAPINE 5 MG: 5 TABLET, ORALLY DISINTEGRATING ORAL at 08:03

## 2020-03-20 RX ADMIN — ENOXAPARIN SODIUM 40 MG: 100 INJECTION SUBCUTANEOUS at 05:03

## 2020-03-20 RX ADMIN — AMLODIPINE BESYLATE 10 MG: 10 TABLET ORAL at 06:03

## 2020-03-20 RX ADMIN — METOPROLOL SUCCINATE 50 MG: 50 TABLET, FILM COATED, EXTENDED RELEASE ORAL at 05:03

## 2020-03-20 RX ADMIN — PROMETHAZINE HYDROCHLORIDE 25 MG: 25 INJECTION INTRAMUSCULAR; INTRAVENOUS at 03:03

## 2020-03-20 RX ADMIN — OXYCODONE 10 MG: 5 TABLET ORAL at 12:03

## 2020-03-20 RX ADMIN — PANTOPRAZOLE SODIUM 40 MG: 40 TABLET, DELAYED RELEASE ORAL at 08:03

## 2020-03-20 NOTE — ASSESSMENT & PLAN NOTE
March 19, 2020  Palliative care input appreciated.  Lipase 130, would monitor daily until decreases.  Can eat as tolerated for now.  Pain meds.    March 20, 2020  --Laboratory review stable  --patient reports adequate pain control with current pain regimen. Continue management per palliative care  --Tolerating clear liquid diet. Decreased appetite. Consider nutrition consult   --lipase trending downward. Near normal  --supportive care

## 2020-03-20 NOTE — ASSESSMENT & PLAN NOTE
-Palliative care consulted for pain control in the setting of HCC   -Full code  -Surrogate Decision Maker: Maddy (wife) at [phone]

## 2020-03-20 NOTE — PROGRESS NOTES
[facility] Hospital Medicine  Progress Note    Patient Name: Ottoniel Arellano Jr.  MRN: [id number]  Patient Class: IP- Inpatient   Admission Date: 3/19/2020  Length of Stay: 1 days  Attending Physician: Pepito Briseno MD  Primary Care Provider: David Stevens MD        Subjective:     Principal Problem:Neoplasm related pain        HPI:  Ottoniel Arellano Jr. is a 79 y.o. male patient with a PMHx of HCC, CAD  Liver Mass, and Arthritis who presents to the Emergency Department for generalized abdominal pain which onset just prior to arrival. Pt presented to the ED on 3/18/2020 wt similar symptom presentation.  Symptoms are constant and moderate in severity. No mitigating or exacerbating factors reported. Associated sxs include weakness, back pain, fatigue, decreased oral intake, nausea and abdominal distention. Patient denies any fever, chills, vomiting, SOB, CP, weakness, numbness, dizziness, headache, and all other sxs at this time. No further complaints or concerns at this time.  Pt denies smoking and use of ETOH.  Pt reports compliance with prescribed medications but did not take them today.  Pt is a full code and Maddy Arellano (wife) at [phone] is the surrogate decision maker.  ER work up showed: , Lactic 2.3, Trace ketones, Urobilirubin 4-6, Lipase 130, , Bilirubin 2.4, and Alk Phos 316.  CT of abdomen/pelvis showed extensive infiltrative hepatocellular carcinoma with portal vein tumor thrombus, ascites, and portacaval lymphadenopathy with no bowel obstruction or evidence for perforation or abscess.  US showed heterogeneity of the liver parenchyma associated with hepatocellular carcinoma and nodular contour compatible with cirrhosis. Thrombosed portal vein and portal vein branches with cavernous transformation at the sylvia hepatis.  Nonspecific, heterogeneous appearance of the partially visualized pancreas.  No focal abnormality.  Gallbladder wall thickening, likely related to intrinsic  hepatic disease.  No calcified stones or sonographic evidence of acute cholecystitis.  Splenomegaly and trace ascites. Hospital Medicine consulted for further management and pt placed on Telemetry Unit.      Overview/Hospital Course:  Pt admitted due to uncontrolled cancer related pain to the abdomen with associated intractable nausea. Seen by Palliative Care:  Fentanyl patches, oral oxycodone and breakthrough morphine ordered. IV fluids in progress and antiemetic meds are adjusted for symptom management.     Interval History: Pt describes right sided abdominal pain. Says he has always had a poor appetite but just can't bring himself to eat and has dry heaving.     Review of Systems  Objective:     Vital Signs (Most Recent):  Temp: 96.8 °F (36 °C) (03/20/20 1734)  Pulse: 87 (03/20/20 1740)  Resp: 18 (03/20/20 1734)  BP: (!) 171/75 (03/20/20 1740)  SpO2: 98 % (03/20/20 1734) Vital Signs (24h Range):  Temp:  [96.8 °F (36 °C)-98.8 °F (37.1 °C)] 96.8 °F (36 °C)  Pulse:  [59-97] 87  Resp:  [16-18] 18  SpO2:  [95 %-98 %] 98 %  BP: (152-185)/(69-91) 171/75     Weight: 80.2 kg (176 lb 12.9 oz)  Body mass index is 25.37 kg/m².    Intake/Output Summary (Last 24 hours) at 3/20/2020 1748  Last data filed at 3/20/2020 1534  Gross per 24 hour   Intake 979.5 ml   Output 500 ml   Net 479.5 ml      Physical Exam   Constitutional: He is oriented to person, place, and time. He appears well-developed and well-nourished.   HENT:   Head: Normocephalic.   Nose: Nose normal.   Mouth/Throat: Oropharynx is clear and moist.   Eyes: Conjunctivae are normal.   Neck: Normal range of motion. Neck supple.   Cardiovascular: Normal rate, regular rhythm and normal heart sounds.   Pulmonary/Chest: Effort normal and breath sounds normal. No respiratory distress. He has no wheezes. He has no rales.   Abdominal: Soft. Bowel sounds are normal. He exhibits distension. There is tenderness.   Genitourinary:   Genitourinary Comments: Deferred     Musculoskeletal: Normal range of motion. He exhibits no edema.   Neurological: He is alert and oriented to person, place, and time.   Skin: Skin is warm and dry.   Psychiatric: He has a normal mood and affect. His behavior is normal.   Vitals reviewed.      Significant Labs:   CBC:   Recent Labs   Lab 03/18/20 2121 03/19/20  1027 03/20/20  0405   WBC 6.81 5.81 4.08   HGB 12.7* 13.1* 12.4*   HCT 38.8* 41.0 38.9*    229 184     CMP:   Recent Labs   Lab 03/18/20 2121 03/19/20  1027 03/20/20  0405    138 138   K 3.8 4.6 4.2   CL 96 97 98   CO2 26 28 27   GLU 90 87 69*   BUN 8 8 7*   CREATININE 0.8 0.8 0.7   CALCIUM 9.7 10.0 9.5   PROT 7.4 7.3 6.6   ALBUMIN 3.4* 3.3* 2.9*   BILITOT 2.1* 2.4* 1.6*   ALKPHOS 259* 316* 322*   AST 96* 128* 128*   ALT 32 44 45*   ANIONGAP 14 13 13   EGFRNONAA >60 >60 >60     All pertinent labs within the past 24 hours have been reviewed.    Significant Imaging: I have reviewed all pertinent imaging results/findings within the past 24 hours.      Assessment/Plan:      Neoplasm related pain  -Palliative care consulted   -analgesia as needed   Fentanyl patch, oxycodone and morphine for breakthrough pain      Anorexia  -increased oral intake encouraged  -Nutrition consult  -will hold appetite stimulants       Encounter for palliative care  -Palliative care consulted for pain control in the setting of HCC   -Full code  -Surrogate Decision Maker: Maddy (wife) at [phone]      Acute pancreatitis  -not likely as imaging negative and Lipase mildly elevated - lipase trending down and pt still having right sided abdominal pain likely due to liver mass and portal vein thrombosis.   -will monitor  -gentle hydration  -repeat Lipase in am       Intractable nausea and vomiting  -antiemetics as needed   -gentle hydration   IV Zofran changed to scheduled Zofran ODT  Compazine scheduled  Zyprexa ordered by Palliative care      Severe malnutrition  -will consult Nutrition   -oral intake  encouraged   -appetite stimulants held- will resume when appropriate       Essential hypertension    -Lopressor continued   -elevated in the setting of pain   Amlodipine prescribed  Hydralazine 10 mg IV every 6 hours prn SBP > 170        Hepatocellular carcinoma  -analgesia as needed   -antiemetics as needed   -Heme/Onc consulted-pt last seen by Dr. Benoit   -Palliative Care consulted   -CT of abdomen/pelvis results consistent with extensive infiltrative hepatocellular carcinoma with portal vein tumor thrombus, ascites, and portacaval lymphadenopathy.  No bowel obstruction or evidence for perforation or abscess.  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

## 2020-03-20 NOTE — ASSESSMENT & PLAN NOTE
-Lopressor continued   -elevated in the setting of pain   Amlodipine prescribed  Hydralazine 10 mg IV every 6 hours prn SBP > 170

## 2020-03-20 NOTE — ASSESSMENT & PLAN NOTE
March 19, 2020  S/p 2 treatments of Opdivo.  Too early to know if effective.    March 20, 2020  --Next Opdivo due 3/31/20. He will need follow up with primary Oncologist in the outpatient setting for further Opdivo treatments

## 2020-03-20 NOTE — PLAN OF CARE
CM met with patient at the bedside.  He is current with [facility].  Choice form completed and placed in the patient blue folder.  Referral made via [facility].

## 2020-03-20 NOTE — PROGRESS NOTES
[facility] -   Hematology/Oncology  Progress Note    Patient Name: Ottoniel Arellano Jr.  Admission Date: 3/19/2020  Hospital Length of Stay: 1 days  Code Status: Full Code     Subjective:     HPI:  Mr. Ottoniel Arellano is a 80 yo gentleman with upper abdominal pain, workup in July 2019 showed portal vein thrombosis and right liver mass, elevated AFP.  Had liver mass biopsy which showed fibrosis.  Was treated with radioembolization. Laboratory studies demonstrate rapid increase in alpha fetoprotein CT chest abdomen pelvis demonstrates metastatic disease and retroperitoneal lymphadenopathy outside of liver.    Started on Opdivo for clinical progression c/w HCC.     Presented to ER with uncontrolled pain.  Lipase elevated 130.        Interval History: No acute events overnight. Patient reports adequate pain control with current pain regimen. Does report decreased appetite. Denies nausea/vomiting. Lipase has trended downward. Elevated BP. Has been started on Toprol XL   Oncology Treatment Plan:   OP NIVOLUMAB Q2W    Medications:  Continuous Infusions:  Scheduled Meds:   enoxaparin  40 mg Subcutaneous Daily    fentaNYL  1 patch Transdermal Q72H    metoprolol succinate  50 mg Oral Daily    olanzapine zydis  5 mg Oral QHS    ondansetron  8 mg Intravenous Q6H    pantoprazole  40 mg Oral Daily    tamsulosin  0.4 mg Oral QHS     PRN Meds:hydrALAZINE, morphine, promethazine     Review of patient's allergies indicates:   Allergen Reactions    Penicillins Hives and Rash    Shellfish containing products      Pt states face and hands turn blotchy red and itch        Past Medical History:   Diagnosis Date    Arthritis     Coronary artery disease     HCC (hepatocellular carcinoma)     Liver mass      Past Surgical History:   Procedure Laterality Date    ANGIOGRAM, CORONARY, WITH LEFT HEART CATHETERIZATION      stents 12/29/2012    EYE SURGERY      right, skin graft    FLUOROSCOPY N/A 1/21/2020    Procedure: Y90  Mapping;  Surgeon: Ar Ham MD;  Location: [facility] CATH LAB;  Service: General;  Laterality: N/A;    FLUOROSCOPY N/A 2/4/2020    Procedure: TheraSphere Liver Mass;  Surgeon: Ar Ham MD;  Location: [facility] CATH LAB;  Service: General;  Laterality: N/A;    SPINAL FUSION      ACDF 05/04/2010    TONSILLECTOMY       Family History     None        Tobacco Use    Smoking status: Former Smoker    Smokeless tobacco: Former User     Quit date: 5/18/1986   Substance and Sexual Activity    Alcohol use: No    Drug use: Not on file    Sexual activity: Not on file       Review of Systems   Constitutional: Positive for activity change and appetite change. Negative for chills, fatigue, fever and unexpected weight change.   HENT: Negative for congestion, mouth sores, nosebleeds, sore throat, trouble swallowing and voice change.    Eyes: Negative for photophobia and visual disturbance.   Respiratory: Negative for cough, chest tightness, shortness of breath and wheezing.    Cardiovascular: Negative for chest pain and leg swelling.   Gastrointestinal: Positive for abdominal distention and abdominal pain. Negative for anal bleeding, blood in stool, constipation, diarrhea, nausea and vomiting.   Genitourinary: Negative for difficulty urinating, dysuria and hematuria.   Musculoskeletal: Negative for arthralgias, back pain and myalgias.   Skin: Negative for pallor, rash and wound.   Neurological: Positive for weakness. Negative for dizziness, syncope and headaches.   Hematological: Negative for adenopathy. Does not bruise/bleed easily.   Psychiatric/Behavioral: The patient is nervous/anxious.      Objective:     Vital Signs (Most Recent):  Temp: 97.8 °F (36.6 °C) (03/20/20 0706)  Pulse: 62 (03/20/20 0706)  Resp: 18 (03/20/20 0706)  BP: (!) 175/72 (03/20/20 0706)  SpO2: 95 % (03/20/20 0706) Vital Signs (24h Range):  Temp:  [97.6 °F (36.4 °C)-98.8 °F (37.1 °C)] 97.8 °F (36.6 °C)  Pulse:  [59-85] 62  Resp:  [10-18] 18  SpO2:   [95 %-97 %] 95 %  BP: (160-182)/(71-91) 175/72     Weight: 80.2 kg (176 lb 12.9 oz)  Body mass index is 25.37 kg/m².  Body surface area is 1.99 meters squared.      Intake/Output Summary (Last 24 hours) at 3/20/2020 1008  Last data filed at 3/20/2020 0900  Gross per 24 hour   Intake 476.5 ml   Output --   Net 476.5 ml       Physical Exam   Constitutional: He is oriented to person, place, and time. He is cooperative.   HENT:   Right Ear: Hearing normal.   Left Ear: Hearing normal.   Cardiovascular: Normal rate.   Neurological: He is alert and oriented to person, place, and time.   Psychiatric: He has a normal mood and affect. His speech is normal and behavior is normal. Judgment and thought content normal.       Significant Labs:   All pertinent labs from the last 24 hours have been reviewed.    Diagnostic Results:  I have reviewed all pertinent imaging results/findings within the past 24 hours.    Assessment/Plan:     Essential hypertension  --management per hospital medicine team    Hepatocellular carcinoma  March 19, 2020  S/p 2 treatments of Opdivo.  Too early to know if effective.    March 20, 2020  --Next Opdivo due 3/31/20. He will need follow up with primary Oncologist in the outpatient setting for further Opdivo treatments      Abdominal pain  March 19, 2020  Palliative care input appreciated.  Lipase 130, would monitor daily until decreases.  Can eat as tolerated for now.  Pain meds.    March 20, 2020  --Laboratory review stable  --patient reports adequate pain control with current pain regimen. Continue management per palliative care  --Tolerating clear liquid diet. Decreased appetite. Consider nutrition consult   --lipase trending downward. Near normal  --supportive care        Thank you for your consult. I will follow-up with patient. Please contact us if you have any additional questions.     Peyton Ivey NP  Hematology/Oncology  [facility]

## 2020-03-20 NOTE — PLAN OF CARE
Pt remains fall free this shift.   Pt AAOx4, clear speech, verbal.   Continues with pain and nausea, managing well with PRN and scheduled meds.  Able to voice concerns.   Ambulates with supervise assist x 1.   Bed alarm in use.   Last BM today.   Skin warm and dry. No new skin issues.   Lovenox in progress.   Tele monitoring in progress. SR with some PACs (sometimes).   POC updated and reviewed with pt. Will continue plan of care.

## 2020-03-20 NOTE — PLAN OF CARE
Pt AAOx4 .POC reviewed with pt. Pt verbalized understanding   Pt remains free of injuries and falls; fall precaution in place   SR on tele monitor. HR 60's - 70's  IV intact; infusing N.S 50 mL/hr   No C/O of pain 4/10; C/O of nausea meds administered    Bed low, side rails up x2, non slip socks in use, call light in reach   Reminded to call for assistance  Hourly rounding complete will continue to monitor

## 2020-03-20 NOTE — ASSESSMENT & PLAN NOTE
-Palliative care consulted   -analgesia as needed   Fentanyl patch, oxycodone and morphine for breakthrough pain

## 2020-03-20 NOTE — SUBJECTIVE & OBJECTIVE
Interval History: No acute events overnight. Patient reports adequate pain control with current pain regimen. Does report decreased appetite. Denies nausea/vomiting. Lipase has trended downward. Elevated BP. Has been started on Toprol XL   Oncology Treatment Plan:   OP NIVOLUMAB Q2W    Medications:  Continuous Infusions:  Scheduled Meds:   enoxaparin  40 mg Subcutaneous Daily    fentaNYL  1 patch Transdermal Q72H    metoprolol succinate  50 mg Oral Daily    olanzapine zydis  5 mg Oral QHS    ondansetron  8 mg Intravenous Q6H    pantoprazole  40 mg Oral Daily    tamsulosin  0.4 mg Oral QHS     PRN Meds:hydrALAZINE, morphine, promethazine     Review of patient's allergies indicates:   Allergen Reactions    Penicillins Hives and Rash    Shellfish containing products      Pt states face and hands turn blotchy red and itch        Past Medical History:   Diagnosis Date    Arthritis     Coronary artery disease     HCC (hepatocellular carcinoma)     Liver mass      Past Surgical History:   Procedure Laterality Date    ANGIOGRAM, CORONARY, WITH LEFT HEART CATHETERIZATION      stents 12/29/2012    EYE SURGERY      right, skin graft    FLUOROSCOPY N/A 1/21/2020    Procedure: Y90 Mapping;  Surgeon: Ar Ham MD;  Location: [facility] CATH LAB;  Service: General;  Laterality: N/A;    FLUOROSCOPY N/A 2/4/2020    Procedure: TheraSphere Liver Mass;  Surgeon: Ar Ham MD;  Location: [facility] CATH LAB;  Service: General;  Laterality: N/A;    SPINAL FUSION      ACDF 05/04/2010    TONSILLECTOMY       Family History     None        Tobacco Use    Smoking status: Former Smoker    Smokeless tobacco: Former User     Quit date: 5/18/1986   Substance and Sexual Activity    Alcohol use: No    Drug use: Not on file    Sexual activity: Not on file       Review of Systems   Constitutional: Positive for activity change and appetite change. Negative for chills, fatigue, fever and unexpected weight change.   HENT:  Negative for congestion, mouth sores, nosebleeds, sore throat, trouble swallowing and voice change.    Eyes: Negative for photophobia and visual disturbance.   Respiratory: Negative for cough, chest tightness, shortness of breath and wheezing.    Cardiovascular: Negative for chest pain and leg swelling.   Gastrointestinal: Positive for abdominal distention and abdominal pain. Negative for anal bleeding, blood in stool, constipation, diarrhea, nausea and vomiting.   Genitourinary: Negative for difficulty urinating, dysuria and hematuria.   Musculoskeletal: Negative for arthralgias, back pain and myalgias.   Skin: Negative for pallor, rash and wound.   Neurological: Positive for weakness. Negative for dizziness, syncope and headaches.   Hematological: Negative for adenopathy. Does not bruise/bleed easily.   Psychiatric/Behavioral: The patient is nervous/anxious.      Objective:     Vital Signs (Most Recent):  Temp: 97.8 °F (36.6 °C) (03/20/20 0706)  Pulse: 62 (03/20/20 0706)  Resp: 18 (03/20/20 0706)  BP: (!) 175/72 (03/20/20 0706)  SpO2: 95 % (03/20/20 0706) Vital Signs (24h Range):  Temp:  [97.6 °F (36.4 °C)-98.8 °F (37.1 °C)] 97.8 °F (36.6 °C)  Pulse:  [59-85] 62  Resp:  [10-18] 18  SpO2:  [95 %-97 %] 95 %  BP: (160-182)/(71-91) 175/72     Weight: 80.2 kg (176 lb 12.9 oz)  Body mass index is 25.37 kg/m².  Body surface area is 1.99 meters squared.      Intake/Output Summary (Last 24 hours) at 3/20/2020 1008  Last data filed at 3/20/2020 0900  Gross per 24 hour   Intake 476.5 ml   Output --   Net 476.5 ml       Physical Exam   Constitutional: He is oriented to person, place, and time. He is cooperative.   HENT:   Right Ear: Hearing normal.   Left Ear: Hearing normal.   Cardiovascular: Normal rate.   Neurological: He is alert and oriented to person, place, and time.   Psychiatric: He has a normal mood and affect. His speech is normal and behavior is normal. Judgment and thought content normal.       Significant Labs:    All pertinent labs from the last 24 hours have been reviewed.    Diagnostic Results:  I have reviewed all pertinent imaging results/findings within the past 24 hours.

## 2020-03-20 NOTE — PLAN OF CARE
SW spoke with patient to complete discharge planning assessment. Patient lives with his wife and states he is pretty independent with his needs. Patient currently has [facility], he stated and would like to resume services. Patient uses a rolling walker and cane. He denies using any other medical equipment. Patient receives palliative services at [facility] as well. Patient JIMBO is his wife, Maddy Arellano. No other CM needs identified at this time.       D/C plan: Home with home health  David Stevens MD    [facility] - [place], [place] - [address]  [address]  [place] [address]  Phone: [phone] Fax: [phone]    [id number] - [place], [place] - [address] [address]  [address]  [place] [address]  Phone: [phone] Fax: [phone]    Transportation: Family      03/20/20 1123   Discharge Assessment   Assessment Type Discharge Planning Assessment   Confirmed/corrected address and phone number on facesheet? Yes   Assessment information obtained from? Patient   Prior to hospitilization cognitive status: Alert/Oriented   Prior to hospitalization functional status: Assistive Equipment;Independent   Current cognitive status: Alert/Oriented   Current Functional Status: Independent;Assistive Equipment   Lives With spouse   Able to Return to Prior Arrangements yes   Is patient able to care for self after discharge? Yes   Readmission Within the Last 30 Days no previous admission in last 30 days   Patient currently being followed by outpatient case management? No   Patient currently receives any other outside agency services? No   Equipment Currently Used at Home walker, rolling;cane, straight   Do you have any problems affording any of your prescribed medications? No   Is the patient taking medications as prescribed? yes   Does the patient have transportation home? Yes   Transportation Anticipated family or friend will provide   Discharge Plan A Home Health   Discharge Plan B Home  with family   DME Needed Upon Discharge  none   Patient/Family in Agreement with Plan yes

## 2020-03-20 NOTE — HOSPITAL COURSE
Mr Arellano is a 79 year old male with was admitted to [facility] with uncontrolled neoplasm related pain to the abdomen with associated intractable nausea. Palliative Care was consulted for pain management. He was evaluated by Palliative Care on yesterday. Fentanyl patch dose was increased and other pain medications adjusted. Patient was givien IV fluids and antiemetic medication was adjusted for symptom management. Today, patient is feeling well, vital signs and labs stable. He reported some mild dizziness early this AM, however dizziness had resolved by noon. Orthostatic blood pressure negative. Case review with Hematology/Oncology, ok to discharge home for there standpoint.  following, he will continue previous [facility]. Patient seen, examined and deemed suitable for discharge.

## 2020-03-20 NOTE — ASSESSMENT & PLAN NOTE
-not likely as imaging negative and Lipase mildly elevated - lipase trending down and pt still having right sided abdominal pain likely due to liver mass and portal vein thrombosis.   -will monitor  -gentle hydration  -repeat Lipase in am

## 2020-03-20 NOTE — SUBJECTIVE & OBJECTIVE
Interval History: Pt describes right sided abdominal pain. Says he has always had a poor appetite but just can't bring himself to eat and has dry heaving.     Review of Systems  Objective:     Vital Signs (Most Recent):  Temp: 96.8 °F (36 °C) (03/20/20 1734)  Pulse: 87 (03/20/20 1740)  Resp: 18 (03/20/20 1734)  BP: (!) 171/75 (03/20/20 1740)  SpO2: 98 % (03/20/20 1734) Vital Signs (24h Range):  Temp:  [96.8 °F (36 °C)-98.8 °F (37.1 °C)] 96.8 °F (36 °C)  Pulse:  [59-97] 87  Resp:  [16-18] 18  SpO2:  [95 %-98 %] 98 %  BP: (152-185)/(69-91) 171/75     Weight: 80.2 kg (176 lb 12.9 oz)  Body mass index is 25.37 kg/m².    Intake/Output Summary (Last 24 hours) at 3/20/2020 1748  Last data filed at 3/20/2020 1534  Gross per 24 hour   Intake 979.5 ml   Output 500 ml   Net 479.5 ml      Physical Exam   Constitutional: He is oriented to person, place, and time. He appears well-developed and well-nourished.   HENT:   Head: Normocephalic.   Nose: Nose normal.   Mouth/Throat: Oropharynx is clear and moist.   Eyes: Conjunctivae are normal.   Neck: Normal range of motion. Neck supple.   Cardiovascular: Normal rate, regular rhythm and normal heart sounds.   Pulmonary/Chest: Effort normal and breath sounds normal. No respiratory distress. He has no wheezes. He has no rales.   Abdominal: Soft. Bowel sounds are normal. He exhibits distension. There is tenderness.   Genitourinary:   Genitourinary Comments: Deferred    Musculoskeletal: Normal range of motion. He exhibits no edema.   Neurological: He is alert and oriented to person, place, and time.   Skin: Skin is warm and dry.   Psychiatric: He has a normal mood and affect. His behavior is normal.   Vitals reviewed.      Significant Labs:   CBC:   Recent Labs   Lab 03/18/20  2121 03/19/20  1027 03/20/20  0405   WBC 6.81 5.81 4.08   HGB 12.7* 13.1* 12.4*   HCT 38.8* 41.0 38.9*    229 184     CMP:   Recent Labs   Lab 03/18/20  2121 03/19/20  1027 03/20/20  0405    138 138   K  3.8 4.6 4.2   CL 96 97 98   CO2 26 28 27   GLU 90 87 69*   BUN 8 8 7*   CREATININE 0.8 0.8 0.7   CALCIUM 9.7 10.0 9.5   PROT 7.4 7.3 6.6   ALBUMIN 3.4* 3.3* 2.9*   BILITOT 2.1* 2.4* 1.6*   ALKPHOS 259* 316* 322*   AST 96* 128* 128*   ALT 32 44 45*   ANIONGAP 14 13 13   EGFRNONAA >60 >60 >60     All pertinent labs within the past 24 hours have been reviewed.    Significant Imaging: I have reviewed all pertinent imaging results/findings within the past 24 hours.

## 2020-03-20 NOTE — PROGRESS NOTES
Progress Note   Palliative Care        SUBJECTIVE:     History of Present Illness:  Patient seen and examined without family present. He reports his pain is a little better controlled with the increased fentanyl patch however just attempted to eat and became nauseated with worsened pain. He ranks his current pain as 6/10. He says the nausea is relatively unchanged and has been reliant on PRN phenergan. He complained of intestinal gas overnight and had a small BM with a lot of flatus which improved his distention but did not have a noticeable difference in his abdominal pain.    In the last 24hrs he has received the following opioids (7a-7a):  - Fentanyl 75mcg/ hr  - Morphine 4mg IV x 1        Past Medical History:   Diagnosis Date    Arthritis     Coronary artery disease     HCC (hepatocellular carcinoma)     Liver mass      Past Surgical History:   Procedure Laterality Date    ANGIOGRAM, CORONARY, WITH LEFT HEART CATHETERIZATION      stents 12/29/2012    EYE SURGERY      right, skin graft    FLUOROSCOPY N/A 1/21/2020    Procedure: Y90 Mapping;  Surgeon: Ar Ham MD;  Location: [facility] CATH LAB;  Service: General;  Laterality: N/A;    FLUOROSCOPY N/A 2/4/2020    Procedure: TheraSphere Liver Mass;  Surgeon: Ar Ham MD;  Location: [facility] CATH LAB;  Service: General;  Laterality: N/A;    SPINAL FUSION      ACDF 05/04/2010    TONSILLECTOMY       History reviewed. No pertinent family history.  Review of patient's allergies indicates:   Allergen Reactions    Penicillins Hives and Rash    Shellfish containing products      Pt states face and hands turn blotchy red and itch       Medications:    Current Facility-Administered Medications:     enoxaparin injection 40 mg, 40 mg, Subcutaneous, Daily, Yasmeen Martin NP, 40 mg at 03/19/20 2022    fentaNYL 75 mcg/hr 1 patch, 1 patch, Transdermal, Q72H, Agata Brown PA-C, 1 patch at 03/19/20 1752    hydrALAZINE injection 10 mg, 10 mg, Intravenous,  Q6H PRN, JESSICA Marc, 10 mg at 03/20/20 0741    metoprolol succinate (TOPROL-XL) 24 hr tablet 50 mg, 50 mg, Oral, Daily, JESSICA Marc, 50 mg at 03/20/20 0504    morphine injection 4 mg, 4 mg, Intravenous, Q4H PRN, Agata Brown PA-C    olanzapine zydis disintegrating tablet 5 mg, 5 mg, Oral, QHS, Agata Brown PA-C, 5 mg at 03/19/20 2022    ondansetron injection 8 mg, 8 mg, Intravenous, Q6H, Agata Brown PA-C, 8 mg at 03/20/20 1119    oxyCODONE immediate release tablet 10 mg, 10 mg, Oral, Q4H PRN, Agata Brown PA-C, 10 mg at 03/20/20 1219    pantoprazole EC tablet 40 mg, 40 mg, Oral, Daily, Yasmeen Martin NP, 40 mg at 03/20/20 0844    polyethylene glycol packet 17 g, 17 g, Oral, Daily, Agata Brown PA-C, 17 g at 03/20/20 1219    promethazine injection 12.5 mg, 12.5 mg, Intramuscular, Q6H PRN, Agata Brown PA-C, 12.5 mg at 03/20/20 0744    tamsulosin 24 hr capsule 0.4 mg, 0.4 mg, Oral, QHS, Yasmeen Martin NP, 0.4 mg at 03/19/20 2022        OBJECTIVE:   Symptom Assessment (ESAS 0-10 scale)     ESAS 0 1 2 3 4 5 6 7 8 9 10   Pain       X       Dyspnea X             Anxiety X             Nausea           X   Depression  X             Anorexia           X   Fatigue X             Insomnia X             Restlessness  X             Agitation X             Constipation    no  Bowel Management Plan (BMP): yes  Diarrhea        no  Comments:       ROS:  Review of Systems   Constitutional: Positive for appetite change and unexpected weight change. Negative for activity change and fever.   HENT: Negative for sore throat and trouble swallowing.    Respiratory: Negative for cough and shortness of breath.    Cardiovascular: Negative for chest pain and leg swelling.   Gastrointestinal: Positive for abdominal pain and nausea. Negative for abdominal distention, constipation, diarrhea and vomiting.   Endocrine: Negative for polydipsia and polyphagia.   Genitourinary: Negative  for difficulty urinating and dysuria.   Musculoskeletal: Negative for back pain and gait problem.   Neurological: Positive for numbness (chronic BLE). Negative for weakness.   Psychiatric/Behavioral: Negative for confusion and sleep disturbance.       Physical Exam:  Vitals: Temp: 97.9 °F (36.6 °C) (03/20/20 1127)  Pulse: 68 (03/20/20 1127)  Resp: 18 (03/20/20 1127)  BP: (!) 162/70 (03/20/20 1127)  SpO2: 97 % (03/20/20 1127)    Gen: well-developed, NAD, fentanyl 75mcg patch noted to R posterior shoulder  Head: atraumatic, normocephalic  Eyes: conjuctiva and sclera clear, EOMI  Ears: hearing grossly intact with no external abnormality  Mouth: no mucositis, good dentition, MM moist  Respiratory: CTAB, no wheezing or rhonchi  Heart: RRR, no murmur  Abdomen: soft, diffusely tender without guarding or rigidity, distention improved, normoactive BS  Pulses: 2+ in DP  Extremities: no edema, full ROM of all joints  Neurologic: no focal deficits, CN II-XII grossly intact, normal coordination  Skin: no rashes or lesions  Psych: cooperative, normal mood and affect, normal attention span and concentration, normal memory, fund of knowledge appropriate      Labs:  CBC:   WBC   Date Value Ref Range Status   03/20/2020 4.08 3.90 - 12.70 K/uL Final     Hemoglobin   Date Value Ref Range Status   03/20/2020 12.4 (L) 14.0 - 18.0 g/dL Final     Hematocrit   Date Value Ref Range Status   03/20/2020 38.9 (L) 40.0 - 54.0 % Final     Mean Corpuscular Volume   Date Value Ref Range Status   03/20/2020 87 82 - 98 fL Final     Platelets   Date Value Ref Range Status   03/20/2020 184 150 - 350 K/uL Final       BMP  Lab Results   Component Value Date     03/20/2020    K 4.2 03/20/2020    CL 98 03/20/2020    CO2 27 03/20/2020    BUN 7 (L) 03/20/2020    CREATININE 0.7 03/20/2020    CALCIUM 9.5 03/20/2020    ANIONGAP 13 03/20/2020    ESTGFRAFRICA >60 03/20/2020    EGFRNONAA >60 03/20/2020       LFT:   Lab Results   Component Value Date    AST  128 (H) 03/20/2020    ALKPHOS 322 (H) 03/20/2020    BILITOT 1.6 (H) 03/20/2020       Albumin:   Albumin   Date Value Ref Range Status   03/20/2020 2.9 (L) 3.5 - 5.2 g/dL Final         Radiology:I have reviewed all pertinent imaging results/findings within the past 24 hours.  - CT abdomen/ pelvis 3/19/20:  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  No bowel obstruction or evidence for perforation or abscess.     - Abd US 3/19/20:  Heterogeneity of the liver parenchyma in keeping with known hepatocellular carcinoma.  Nodular contour compatible with cirrhosis.    Thrombosed portal vein and portal vein branches with cavernous transformation at the sylvia hepatis.    Nonspecific, heterogeneous appearance of the partially visualized pancreas.  No focal abnormality.    Gallbladder wall thickening, likely related to intrinsic hepatic disease.  No calcified stones or sonographic evidence of acute cholecystitis.    Splenomegaly and trace ascites.     ASSESSMENT   Ottoniel Arellano Jr. is a 79 y.o. 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 Imaging extensive infiltrative HCC with portal vein tumor thrombus, ascites, and portacaval lymphadenopathy. Laboratory workup was significant for elevated lipase and elevated bilirubin. He is under the care of Palliative Care for pain and symptom management so we were consulted to assist during this admission.      PLAN   1. Neoplasm pain management  - Continue Fentanyl 75mcg/ hr q72hr   - Start oxycodone 10mg q4hr PRN pain  - Continue Morphine 4mg IV but space to q4hr PRN pain unrelieved by PO medications or unable to tolerate PO  - A record of the controlled substances prescribed and dispensed to the patient in [place] was reviewed in  by me (or my delegate). Last  received Fentanyl 50mcg patches #5 on 3/11/20, methadone 5mg #60 on 3/5/20, MS ER 30mg #90 on 2/26/20, and oxycodone 5mg #90 on 2/26/20.     2. Intractable nausea, unchanged  - Continue scheduled Zofran 8mg IV q6hr  - Increase Phenergan to 25mg IV q6hr PRN breakthrough nausea  - Continue Zyprexa zydis 5mg HS for sleep and nausea  - We could also consider Civanti, low dose Haldol, or Compazine- I will discuss with medical team     3. HCC with portal vein thrombus  - Oncology consulted  - Currently on immunotherapy (nivolumab)     4. Pancreatitis?  - Imaging negative for inflammation and lipase trending down  - Continues to report postprandial pain   - Denies alcohol and fatty food intake   - Defer to primary     5. Anorexia related to malignancy  - Previously prescribed Marinol however neither he nor his wife know if he is currently taking  - Since PO intake exacerbates his pain I recommend holding appetite stimulants at this time and work on pain control.      6. Encounter for Palliative Care  - Code status: FULL and does not want long term mechanical ventilation should he have no chance of meaningful recovery  - HCPOA and living will scanned into the computer  - HCPOA: wife Maddy, then sons zA and Jason as alternate  - We will arrange Palliative Care follow up at the time of discharge     7. Opioid induced constipation  - Start daily Miralax     Discussed opioid regimen with Dr. JONAS Cooper.     Thank you for allowing Palliative care to be involved in the care of Ottoniel Arellano JrSathish.       Medical decision making: HIGH based on high risk of death, untreated symptoms, high risk medications, poor prognosis, management of more than one chronic illness in exacerbation, managing side effects of medications or polypharmacy, parenteral opioid usage.     Plan required increased review of medication choice, interaction, dosing, frequency, and route due to patient complexity. Patient complexity increased by:  Presence of  advanced age, At risk for delirium, Continuous use of opioids    > 50% of 35 min visit spent in chart review, face to face discussion of symptom assessment, coordination of care and emotional support, formulating and communicating prognosis and goals of care, exploring burden/ benefit of various approaches of treatment.       Agata Brown PA-C  Palliative Care

## 2020-03-20 NOTE — ASSESSMENT & PLAN NOTE
-antiemetics as needed   -gentle hydration   IV Zofran changed to scheduled Zofran ODT  Compazine scheduled  Zyprexa ordered by Palliative care

## 2020-03-21 VITALS
DIASTOLIC BLOOD PRESSURE: 76 MMHG | HEART RATE: 72 BPM | TEMPERATURE: 98 F | OXYGEN SATURATION: 93 % | HEIGHT: 70 IN | WEIGHT: 176.13 LBS | BODY MASS INDEX: 25.21 KG/M2 | RESPIRATION RATE: 18 BRPM | SYSTOLIC BLOOD PRESSURE: 130 MMHG

## 2020-03-21 PROBLEM — C22.0 PORTAL VEIN THROMBOSIS SECONDARY TO HCC INVASION: Status: RESOLVED | Noted: 2019-07-12 | Resolved: 2020-03-21

## 2020-03-21 PROBLEM — R10.9 ABDOMINAL PAIN: Status: RESOLVED | Noted: 2019-07-12 | Resolved: 2020-03-21

## 2020-03-21 PROBLEM — I81 PORTAL VEIN THROMBOSIS SECONDARY TO HCC INVASION: Status: RESOLVED | Noted: 2019-07-12 | Resolved: 2020-03-21

## 2020-03-21 PROBLEM — K85.90 ACUTE PANCREATITIS: Status: RESOLVED | Noted: 2020-03-19 | Resolved: 2020-03-21

## 2020-03-21 PROBLEM — R11.2 INTRACTABLE NAUSEA AND VOMITING: Status: RESOLVED | Noted: 2020-03-07 | Resolved: 2020-03-21

## 2020-03-21 PROBLEM — R63.0 ANOREXIA: Status: RESOLVED | Noted: 2020-03-19 | Resolved: 2020-03-21

## 2020-03-21 PROBLEM — E43 SEVERE MALNUTRITION: Status: RESOLVED | Noted: 2020-02-05 | Resolved: 2020-03-21

## 2020-03-21 LAB
ALBUMIN SERPL BCP-MCNC: 2.9 G/DL (ref 3.5–5.2)
ALP SERPL-CCNC: 273 U/L (ref 55–135)
ALT SERPL W/O P-5'-P-CCNC: 34 U/L (ref 10–44)
ANION GAP SERPL CALC-SCNC: 11 MMOL/L (ref 8–16)
AST SERPL-CCNC: 96 U/L (ref 10–40)
BASOPHILS # BLD AUTO: 0.04 K/UL (ref 0–0.2)
BASOPHILS NFR BLD: 1 % (ref 0–1.9)
BILIRUB SERPL-MCNC: 1.3 MG/DL (ref 0.1–1)
BUN SERPL-MCNC: 5 MG/DL (ref 8–23)
CALCIUM SERPL-MCNC: 8.9 MG/DL (ref 8.7–10.5)
CHLORIDE SERPL-SCNC: 99 MMOL/L (ref 95–110)
CO2 SERPL-SCNC: 27 MMOL/L (ref 23–29)
CREAT SERPL-MCNC: 0.7 MG/DL (ref 0.5–1.4)
DIFFERENTIAL METHOD: ABNORMAL
EOSINOPHIL # BLD AUTO: 0.1 K/UL (ref 0–0.5)
EOSINOPHIL NFR BLD: 2.2 % (ref 0–8)
ERYTHROCYTE [DISTWIDTH] IN BLOOD BY AUTOMATED COUNT: 18.9 % (ref 11.5–14.5)
EST. GFR  (AFRICAN AMERICAN): >60 ML/MIN/1.73 M^2
EST. GFR  (NON AFRICAN AMERICAN): >60 ML/MIN/1.73 M^2
GLUCOSE SERPL-MCNC: 78 MG/DL (ref 70–110)
HCT VFR BLD AUTO: 37.6 % (ref 40–54)
HGB BLD-MCNC: 12 G/DL (ref 14–18)
IMM GRANULOCYTES # BLD AUTO: 0.01 K/UL (ref 0–0.04)
IMM GRANULOCYTES NFR BLD AUTO: 0.2 % (ref 0–0.5)
LYMPHOCYTES # BLD AUTO: 0.6 K/UL (ref 1–4.8)
LYMPHOCYTES NFR BLD: 13.5 % (ref 18–48)
MCH RBC QN AUTO: 27.5 PG (ref 27–31)
MCHC RBC AUTO-ENTMCNC: 31.9 G/DL (ref 32–36)
MCV RBC AUTO: 86 FL (ref 82–98)
MONOCYTES # BLD AUTO: 0.5 K/UL (ref 0.3–1)
MONOCYTES NFR BLD: 12.6 % (ref 4–15)
NEUTROPHILS # BLD AUTO: 2.9 K/UL (ref 1.8–7.7)
NEUTROPHILS NFR BLD: 70.5 % (ref 38–73)
NRBC BLD-RTO: 0 /100 WBC
PLATELET # BLD AUTO: 174 K/UL (ref 150–350)
PMV BLD AUTO: 10.4 FL (ref 9.2–12.9)
POTASSIUM SERPL-SCNC: 3.9 MMOL/L (ref 3.5–5.1)
PROT SERPL-MCNC: 6.5 G/DL (ref 6–8.4)
RBC # BLD AUTO: 4.36 M/UL (ref 4.6–6.2)
SODIUM SERPL-SCNC: 137 MMOL/L (ref 136–145)
WBC # BLD AUTO: 4.14 K/UL (ref 3.9–12.7)

## 2020-03-21 PROCEDURE — 25000003 PHARM REV CODE 250: Performed by: PHYSICIAN ASSISTANT

## 2020-03-21 PROCEDURE — 99233 SBSQ HOSP IP/OBS HIGH 50: CPT | Mod: ,,, | Performed by: INTERNAL MEDICINE

## 2020-03-21 PROCEDURE — 25000003 PHARM REV CODE 250: Performed by: NURSE PRACTITIONER

## 2020-03-21 PROCEDURE — 63600175 PHARM REV CODE 636 W HCPCS: Performed by: NURSE PRACTITIONER

## 2020-03-21 PROCEDURE — 80053 COMPREHEN METABOLIC PANEL: CPT

## 2020-03-21 PROCEDURE — 36415 COLL VENOUS BLD VENIPUNCTURE: CPT

## 2020-03-21 PROCEDURE — 85025 COMPLETE CBC W/AUTO DIFF WBC: CPT

## 2020-03-21 PROCEDURE — 99233 PR SUBSEQUENT HOSPITAL CARE,LEVL III: ICD-10-PCS | Mod: ,,, | Performed by: INTERNAL MEDICINE

## 2020-03-21 RX ORDER — FENTANYL 75 UG/H
1 PATCH TRANSDERMAL
Qty: 5 PATCH | Refills: 0 | Status: SHIPPED | OUTPATIENT
Start: 2020-03-22 | End: 2020-03-31 | Stop reason: SDUPTHER

## 2020-03-21 RX ADMIN — PROCHLORPERAZINE MALEATE 5 MG: 5 TABLET ORAL at 09:03

## 2020-03-21 RX ADMIN — PANTOPRAZOLE SODIUM 40 MG: 40 TABLET, DELAYED RELEASE ORAL at 09:03

## 2020-03-21 RX ADMIN — AMLODIPINE BESYLATE 10 MG: 10 TABLET ORAL at 09:03

## 2020-03-21 RX ADMIN — METOPROLOL SUCCINATE 50 MG: 50 TABLET, FILM COATED, EXTENDED RELEASE ORAL at 09:03

## 2020-03-21 NOTE — PROGRESS NOTES
[facility] -   Hematology/Oncology  Progress Note    Patient Name: Ottoniel Arellano Jr.  Admission Date: 3/19/2020  Hospital Length of Stay: 2 days  Code Status: Full Code     Subjective:     HPI:  Mr. Ottoniel Arellano is a 78 yo gentleman with upper abdominal pain, workup in July 2019 showed portal vein thrombosis and right liver mass, elevated AFP.  Had liver mass biopsy which showed fibrosis.  Was treated with radioembolization. Laboratory studies demonstrate rapid increase in alpha fetoprotein CT chest abdomen pelvis demonstrates metastatic disease and retroperitoneal lymphadenopathy outside of liver.    Started on Opdivo for clinical progression c/w HCC.     Presented to ER with uncontrolled pain.  Lipase elevated 130.      Interval history:  Resting in his bed.  Denies worsened abdominal pain.  No acute event overnight.  Blood pressure better control since initiating Toprol.    Review of Systems   Constitutional: Positive for activity change and appetite change. Negative for chills, fatigue, fever and unexpected weight change.   HENT: Negative for congestion, mouth sores, nosebleeds, sore throat, trouble swallowing and voice change.    Eyes: Negative for photophobia and visual disturbance.   Respiratory: Negative for cough, chest tightness, shortness of breath and wheezing.    Cardiovascular: Negative for chest pain and leg swelling.   Gastrointestinal: Positive for abdominal distention and abdominal pain. Negative for anal bleeding, blood in stool, constipation, diarrhea, nausea and vomiting.   Genitourinary: Negative for difficulty urinating, dysuria and hematuria.   Musculoskeletal: Negative for arthralgias, back pain and myalgias.   Skin: Negative for pallor, rash and wound.   Neurological: Positive for weakness. Negative for dizziness, syncope and headaches.   Hematological: Negative for adenopathy. Does not bruise/bleed easily.   Psychiatric/Behavioral: The patient is nervous/anxious      Physical  Exam   Constitutional: He is oriented to person, place, and time. He is cooperative.   HENT:   Right Ear: Hearing normal.   Left Ear: Hearing normal.   Cardiovascular: Normal rate.   Neurological: He is alert and oriented to person, place, and time.   Psychiatric: He has a normal mood and affect. His speech is normal and behavior is normal. Judgment and thought content normal.         Significant Labs:   All pertinent labs from the last 24 hours have been reviewed.     Diagnostic Results:  I have reviewed all pertinent imaging results/findings within the past 24 hours.       Assessment/Plan:     # abdominal pain:  -likely cancer related.  Palliative care on board and managing pain.  -tolerating diet.  Recommend nutrition consult.  -continue supportive care.    # hepatocellular carcinoma:  -currently on Opdivo status post 2 cycles.  -will follow-up as outpatient for further discussion regarding chemotherapy/immunotherapy    # essential hypertension:  -currently stable with initiation of Toprol.  -continue to monitor vitals.      Thank you for allowing us to participate in the care of this patient.  Please contact us with any question or concern.      Umesh Moody MD      Thank you for your consult. I will follow-up with patient. Please contact us if you have any additional questions.     Umesh Moody MD  Hematology/Oncology  [facility]

## 2020-03-21 NOTE — PLAN OF CARE
KASI spoke with patient verbal consent to resume services with [facility]. KASI will send order to [facility].        03/21/20 1415   Post-Acute Status   Post-Acute Authorization Home Health/Hospice   Home Health/Hospice Status Set-up Complete   Discharge Plan   Discharge Plan A Home Health   Discharge Plan B Home with family

## 2020-03-21 NOTE — DISCHARGE SUMMARY
[facility] Hospital Medicine  Discharge Summary      Patient Name: Ottoniel Arellano Jr.  MRN: [id number]  Admission Date: 3/6/2020  Hospital Length of Stay: 1 days  Discharge Date and Time: 3/10/2020  6:50 PM  Attending Physician:  Ko Maldonado MD  Discharging Provider: Ko Maldonado MD  Primary Care Provider: David Stevens MD      HPI:   Ottoniel Arellano is an 79 year old male with CAD, arthritis, and HCC who presented for evaluation of worsening abd pain which onset gradually several days PTA. He reports chronic diffuse abdominal pain, nausea, decreased appetite and weight loss, but denies other symptoms including fever and chills. Pt reports that he has not been able to keep down any food or liquid. Of note, it appears the patient has been unable to take his oral medications due his chronic symptoms. In the ED, CT abdomen showed 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  No bowel obstruction or evidence for perforation or abscess. Patient placed in observation for intractable n/v and pain control. Code status was discussed with the patient. He is a full code. His wife, Maddy Arellano, is his surrogate medical decision maker.           * No surgery found *      Hospital Course:   Admitted for evaluation and treatment of intractable nausea, vomiting, and abdominal pain.  Presumably related to advanced hepatocellular carcinoma.  With associated Michelle caval thrombus.  Medical and radiation oncology evaluated and assisted with management.  Optimized medications for symptom management.  Slow but steady response.  Started with clear liquids in advance to regular meals.  Discharge plan to return home follow-up with oncology outpatient and primary care as needed.     Consults:   Consults (From admission, onward)        Status Ordering Provider     Inpatient consult to Radiation Oncology  Once     Provider:  Ruben Murray  III, MD    Completed VIKASH RON          No new Assessment & Plan notes have been filed under this hospital service since the last note was generated.  Service: Hospital Medicine    Final Active Diagnoses:    Diagnosis Date Noted POA    PRINCIPAL PROBLEM:  Abdominal pain [R10.9] 07/12/2019 Yes    Intractable nausea and vomiting [R11.2] 03/07/2020 Yes    Severe malnutrition [E43] 02/05/2020 Yes    Essential hypertension [I10] 08/26/2019 Yes    CAD (coronary artery disease) [I25.10] 07/12/2019 Yes    Hepatocellular carcinoma [C22.0] 07/12/2019 Yes    Portal vein thrombosis secondary to HCC invasion [C22.0, I81] 07/12/2019 Yes      Problems Resolved During this Admission:       Discharged Condition: good    [place] [facility]    Follow Up:  Follow-up Information     Patrice Samayoa.    Specialties:  Home Health Services, Hospice Services  Why:  Home Health  Contact information:  [address] [place] [id number]  [phone]                 Patient Instructions:      Diet Adult Regular     Activity as tolerated       Significant Diagnostic Studies: Labs: All labs within the past 24 hours have been reviewed    Pending Diagnostic Studies:     None         Medications:  Reconciled Home Medications:      Medication List      CONTINUE taking these medications    aspirin 81 MG Chew  Take 81 mg by mouth once daily.     atorvastatin 40 MG tablet  Commonly known as:  LIPITOR  Take 40 mg by mouth once daily.     cinnamon bark 500 mg capsule  Take 1,000 mg by mouth once daily.     diphenhydrAMINE 25 mg capsule  Commonly known as:  BENADRYL  Take 50 mg by mouth nightly as needed for Itching.     fish oil-omega-3 fatty acids 300-1,000 mg capsule  Take 2 g by mouth once daily.     FLONASE ALLERGY RELIEF 50 mcg/actuation nasal spray  Generic drug:  fluticasone propionate  1 spray by Each Nare route once daily.     meloxicam 15 MG tablet  Commonly known as:  MOBIC  Take 15 mg by mouth daily as needed for  Pain.     methadone 5 MG tablet  Commonly known as:  DOLOPHINE  Take 1 tablet (5 mg total) by mouth every 6 (six) hours as needed for Pain.     metoprolol tartrate 25 MG tablet  Commonly known as:  LOPRESSOR     mirtazapine 7.5 MG Tab  Commonly known as:  REMERON  Take 1-2 tablets (7.5-15 mg total) by mouth nightly. For sleep and appetite     NexIUM 40 MG capsule  Generic drug:  esomeprazole  Take 40 mg by mouth daily as needed.     oxyCODONE 5 MG immediate release tablet  Commonly known as:  ROXICODONE  Take 1 tablet (5 mg total) by mouth every 4 (four) hours as needed for Pain. Medically necessary for more than 7 days     promethazine 12.5 MG Tab  Commonly known as:  PHENERGAN  Take 1 tablet (12.5 mg total) by mouth every 6 to 8 hours as needed.     tamsulosin 0.4 mg Cap  Commonly known as:  FLOMAX  Take 0.4 mg by mouth every evening.     VITAMIN C 500 MG tablet  Generic drug:  ascorbic acid (vitamin C)  Take 500 mg by mouth once daily.     vitamin D 1000 units Tab  Commonly known as:  VITAMIN D3  Take 1,000 Units by mouth.     vitamin E 400 unit Tab  Take 1 tablet by mouth once daily.        STOP taking these medications    ondansetron 4 MG tablet  Commonly known as:  ZOFRAN     warfarin 5 MG tablet  Commonly known as:  COUMADIN            Indwelling Lines/Drains at time of discharge:   Lines/Drains/Airways     None                 Time spent on the discharge of patient: 30 minutes  Patient was seen and examined on the date of discharge and determined to be suitable for discharge.         Ko Maldonado MD  Department of Hospital Medicine  [facility]

## 2020-03-21 NOTE — HOSPITAL COURSE
Admitted for evaluation and treatment of intractable nausea, vomiting, and abdominal pain.  Presumably related to advanced hepatocellular carcinoma.  With associated Michelle caval thrombus.  Medical and radiation oncology evaluated and assisted with management.  Optimized medications for symptom management.  Slow but steady response.  Started with clear liquids in advance to regular meals.  Discharge plan to return home follow-up with oncology outpatient and primary care as needed.

## 2020-03-21 NOTE — DISCHARGE SUMMARY
[facility] Hospital Medicine  Discharge Summary      Patient Name: Ottoniel Arellano Jr.  MRN: [id number]  Admission Date: 3/19/2020  Hospital Length of Stay: 2 days  Discharge Date and Time: 3/21/2020  3:23 PM  Attending Physician: Bianca Mullen MD  Discharging Provider: Jose M Calvillo NP  Primary Care Provider: David Stevens MD      HPI:   Ottoniel Arellano Jr. is a 79 y.o. male patient with a PMHx of HCC, CAD  Liver Mass, and Arthritis who presents to the Emergency Department for generalized abdominal pain which onset just prior to arrival. Pt presented to the ED on 3/18/2020 wt similar symptom presentation.  Symptoms are constant and moderate in severity. No mitigating or exacerbating factors reported. Associated sxs include weakness, back pain, fatigue, decreased oral intake, nausea and abdominal distention. Patient denies any fever, chills, vomiting, SOB, CP, weakness, numbness, dizziness, headache, and all other sxs at this time. No further complaints or concerns at this time.  Pt denies smoking and use of ETOH.  Pt reports compliance with prescribed medications but did not take them today.  Pt is a full code and Maddy Arellano (wife) at [phone] is the surrogate decision maker.  ER work up showed: , Lactic 2.3, Trace ketones, Urobilirubin 4-6, Lipase 130, , Bilirubin 2.4, and Alk Phos 316.  CT of abdomen/pelvis showed extensive infiltrative hepatocellular carcinoma with portal vein tumor thrombus, ascites, and portacaval lymphadenopathy with no bowel obstruction or evidence for perforation or abscess.  US showed heterogeneity of the liver parenchyma associated with hepatocellular carcinoma and nodular contour compatible with cirrhosis. Thrombosed portal vein and portal vein branches with cavernous transformation at the sylvia hepatis.  Nonspecific, heterogeneous appearance of the partially visualized pancreas.  No focal abnormality.  Gallbladder wall thickening, likely related to intrinsic  hepatic disease.  No calcified stones or sonographic evidence of acute cholecystitis.  Splenomegaly and trace ascites. Hospital Medicine consulted for further management and pt placed on Telemetry Unit.      * No surgery found *      Hospital Course:   Mr Arellano is a 79 year old male with was admitted to [facility] with uncontrolled neoplasm related pain to the abdomen with associated intractable nausea. Palliative Care was consulted for pain management. He was evaluated by Palliative Care on yesterday. Fentanyl patch dose was increased and other pain medications adjusted. Patient was givien IV fluids and antiemetic medication was adjusted for symptom management. Today, patient is feeling well, vital signs and labs stable. He reported some mild dizziness early this AM, however dizziness had resolved by noon. Orthostatic blood pressure negative. Case review with Hematology/Oncology, ok to discharge home for there standpoint.  following, he will continue previous [facility]. Patient seen, examined and deemed suitable for discharge.      Consults:   Consults (From admission, onward)        Status Ordering Provider     Inpatient consult to Hematology  Once     Provider:  (Not yet assigned)    LUANN Villalta     Inpatient consult to Palliative Care  Once     Provider:  Agata Brown PA-C    Completed HOMER DAS          No new Assessment & Plan notes have been filed under this hospital service since the last note was generated.  Service: Hospital Medicine    Final Active Diagnoses:    Diagnosis Date Noted POA    PRINCIPAL PROBLEM:  Neoplasm related pain [G89.3] 03/19/2020 Yes    Encounter for palliative care [Z51.5] 03/19/2020 Not Applicable    Essential hypertension [I10] 08/26/2019 Yes    Hepatocellular carcinoma [C22.0] 07/12/2019 Yes    CAD (coronary artery disease) [I25.10] 07/12/2019 Yes      Problems Resolved During this Admission:    Diagnosis Date Noted Date Resolved  POA    Anorexia [R63.0] 03/19/2020 03/21/2020 Yes    Acute pancreatitis [K85.90] 03/19/2020 03/21/2020 Yes    Intractable nausea and vomiting [R11.2] 03/07/2020 03/21/2020 Yes    Severe malnutrition [E43] 02/05/2020 03/21/2020 Yes    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 Go on 3/31/2020.    Specialty:  Hematology and Oncology  Why:  hospital follow up Hepatocellular carcinoma   Contact information:  [address]  [place] [address]  [phone]             Nathalia Cooper MD. Schedule an appointment as soon as possible for a visit in 2 days.    Specialty:  Family Medicine  Why:  Pallitive care, neoplasm related pain   Contact information:  [address]  [place] [address]  [phone]             [facility].    Why:  Home Health  Contact information:  [address] [place] [id number]  [phone]               Patient Instructions:      Notify your health care provider if you experience any of the following:  severe uncontrolled pain     Activity as tolerated       Significant Diagnostic Studies: Labs:   CMP   Recent Labs   Lab 03/20/20  0405 03/21/20  0433    137   K 4.2 3.9   CL 98 99   CO2 27 27   GLU 69* 78   BUN 7* 5*   CREATININE 0.7 0.7   CALCIUM 9.5 8.9   PROT 6.6 6.5   ALBUMIN 2.9* 2.9*   BILITOT 1.6* 1.3*   ALKPHOS 322* 273*   * 96*   ALT 45* 34   ANIONGAP 13 11   ESTGFRAFRICA >60 >60   EGFRNONAA >60 >60    and CBC   Recent Labs   Lab 03/20/20  0405 03/21/20  0433   WBC 4.08 4.14   HGB 12.4* 12.0*   HCT 38.9* 37.6*    174       Pending Diagnostic Studies:     None         Medications:  Reconciled Home Medications:      Medication List      START taking these medications    fentaNYL 75  mcg/hr  Commonly known as:  DURAGESIC  Place 1 patch onto the skin every 72 hours.  Start taking on:  March 22, 2020  Replaces:  fentaNYL 50 mcg/hr        CONTINUE taking these medications    aspirin 81 MG Chew  Take 81 mg by mouth once daily.     atorvastatin 40 MG tablet  Commonly known as:  LIPITOR  Take 40 mg by mouth once daily.     cinnamon bark 500 mg capsule  Take 1,000 mg by mouth once daily.     diphenhydrAMINE 25 mg capsule  Commonly known as:  BENADRYL  Take 50 mg by mouth nightly as needed for Itching.     dronabinoL 5 MG capsule  Commonly known as:  MARINOL  Take 1 capsule (5 mg total) by mouth 2 (two) times daily before meals.     fish oil-omega-3 fatty acids 300-1,000 mg capsule  Take 2 g by mouth once daily.     FLONASE ALLERGY RELIEF 50 mcg/actuation nasal spray  Generic drug:  fluticasone propionate  1 spray by Each Nare route once daily.     meloxicam 15 MG tablet  Commonly known as:  MOBIC  Take 15 mg by mouth daily as needed for Pain.     methadone 5 MG tablet  Commonly known as:  DOLOPHINE  Take 1 tablet (5 mg total) by mouth every 6 (six) hours as needed for Pain.     metoprolol succinate 50 MG 24 hr tablet  Commonly known as:  TOPROL-XL  Take 1 tablet (50 mg total) by mouth once daily.     metoprolol tartrate 25 MG tablet  Commonly known as:  LOPRESSOR     mirtazapine 7.5 MG Tab  Commonly known as:  REMERON  Take 1-2 tablets (7.5-15 mg total) by mouth nightly. For sleep and appetite     NexIUM 40 MG capsule  Generic drug:  esomeprazole  Take 40 mg by mouth daily as needed.     OLANZapine 5 MG tablet  Commonly known as:  ZyPREXA  Take 1 tablet (5 mg total) by mouth nightly as needed (nausea).     ondansetron 8 MG tablet  Commonly known as:  ZOFRAN  Take 1 tablet (8 mg total) by mouth every 8 (eight) hours as needed for Nausea.     oxyCODONE 5 MG immediate release tablet  Commonly known as:  ROXICODONE  Take 1 tablet (5 mg total) by mouth every 4 (four) hours as needed for Pain. Medically  necessary for more than 7 days     prochlorperazine 10 MG tablet  Commonly known as:  COMPAZINE  Take 1 tablet (10 mg total) by mouth every 6 (six) hours as needed.     promethazine 12.5 MG Tab  Commonly known as:  PHENERGAN  Take 1 tablet (12.5 mg total) by mouth every 6 to 8 hours as needed.     tamsulosin 0.4 mg Cap  Commonly known as:  FLOMAX  Take 0.4 mg by mouth every evening.     VITAMIN C 500 MG tablet  Generic drug:  ascorbic acid (vitamin C)  Take 500 mg by mouth once daily.     vitamin D 1000 units Tab  Commonly known as:  VITAMIN D3  Take 1,000 Units by mouth.     vitamin E 400 unit Tab  Take 1 tablet by mouth once daily.        STOP taking these medications    fentaNYL 50 mcg/hr  Commonly known as:  DURAGESIC  Replaced by:  fentaNYL 75 mcg/hr            Indwelling Lines/Drains at time of discharge:   Lines/Drains/Airways     None                 Time spent on the discharge of patient: 45 minutes  Patient was seen and examined on the date of discharge and determined to be suitable for discharge.         Jose M Calvillo NP  Department of Hospital Medicine  [facility]

## 2020-03-21 NOTE — PLAN OF CARE
Patient aao, VSS.  Speech clear, able to verbalize needs and concerns.  Refused scheduled Zofran.  Patient afebrile.   Tele sitter at bedside.  Patient remain free from falls during this shift.  Bed alarm maintained.  SR on monitor.  Ambulates with assist x 1, generalized weakness noted.  Assist provided with frequent weight shifting.  Plan of care reviewed, patient verbalized understanding.  Bed in low position, side rails x 2, call light in reach.  Will continue to monitor.

## 2020-03-23 NOTE — PLAN OF CARE
03/23/20 0724   Final Note   Assessment Type Final Discharge Note   Anticipated Discharge Disposition Home-Health   Right Care Referral Info   Post Acute Recommendation Home-care   Facility Name [facility]

## 2020-03-24 ENCOUNTER — TELEPHONE (OUTPATIENT)
Dept: INTERNAL MEDICINE | Facility: CLINIC | Age: 80
End: 2020-03-24

## 2020-03-24 LAB
BACTERIA BLD CULT: NORMAL
BACTERIA BLD CULT: NORMAL

## 2020-03-24 NOTE — PHYSICIAN QUERY
PT Name: Ottoniel Arellano Jr.  MR #: [id number]     Physician Query Form - Diagnosis Clarification      CDS: Roque HERNANDEZ,RN        Contact information:[phone]    This form is a permanent document in the medical record.     Query Date: March 24, 2020    By submitting this query, we are merely seeking further clarification of documentation.  Please utilize your independent clinical judgment when addressing the question(s) below.     The medical record contains the following:      Findings Supporting Clinical Information Location in Medical Record                 Acute pancreatitis  Pancreatitis?  - Imaging negative for inflammation but lipase elevated  - Denies alcohol and fatty food intake   - Defer to primary     Lipase =130--->69      Acute pancreatitis  -not likely as imaging negative and Lipase mildly elevated - lipase trending down and pt still having right sided abdominal pain likely due to liver mass and portal vein thrombosis.         Diagnosis  Acute pancreatitis   3/19/20 Palliative Medicine Consults        3/19--->3/20 Labs        3/20/20 Hospital Medicine  Progress Notes          3/21/20 Hospital Medicine Discharge Summary     Please clarify if the _Acute pancreatitis__________________________ diagnosis has been:    [  ] Ruled In   [xx  ] Ruled Out   [  ] Other/Clarification of findings (please specify):     [  ] Clinically undetermined     Please document in your progress notes daily for the duration of treatment, until resolved, and include in your discharge summary.

## 2020-03-24 NOTE — TELEPHONE ENCOUNTER
----- Message from Stacy Zabala sent at 3/24/2020  2:31 PM CDT -----  Contact: pt   Pt called about the patch that the hospital put on his lower back. Pt wants to know what to do with it. He said he didn't receive any instructions on what to do with it when he left the hospital     Pt is requesting a call back to discuss this matter further at [phone]

## 2020-03-25 ENCOUNTER — TELEPHONE (OUTPATIENT)
Dept: INTERNAL MEDICINE | Facility: CLINIC | Age: 80
End: 2020-03-25

## 2020-03-25 NOTE — TELEPHONE ENCOUNTER
----- Message from Domingo Lagunas sent at 3/25/2020  1:40 PM CDT -----  Contact: Self  Pt calling in regards to speak with nurse in office please, pt not sure on how to use Vascular Closure Appt for virtual visit, needs help with understanding how to use it       Please advise pt can be contact at [phone]

## 2020-03-26 ENCOUNTER — TELEPHONE (OUTPATIENT)
Dept: PALLIATIVE MEDICINE | Facility: HOSPITAL | Age: 80
End: 2020-03-26

## 2020-03-31 ENCOUNTER — INFUSION (OUTPATIENT)
Dept: INFUSION THERAPY | Facility: HOSPITAL | Age: 80
End: 2020-03-31
Attending: INTERNAL MEDICINE
Payer: MEDICARE

## 2020-03-31 ENCOUNTER — DOCUMENTATION ONLY (OUTPATIENT)
Dept: HEMATOLOGY/ONCOLOGY | Facility: CLINIC | Age: 80
End: 2020-03-31

## 2020-03-31 ENCOUNTER — OFFICE VISIT (OUTPATIENT)
Dept: HEMATOLOGY/ONCOLOGY | Facility: CLINIC | Age: 80
End: 2020-03-31
Payer: MEDICARE

## 2020-03-31 VITALS
OXYGEN SATURATION: 97 % | SYSTOLIC BLOOD PRESSURE: 136 MMHG | RESPIRATION RATE: 18 BRPM | HEIGHT: 70 IN | HEART RATE: 73 BPM | WEIGHT: 176.81 LBS | TEMPERATURE: 98 F | BODY MASS INDEX: 25.31 KG/M2 | DIASTOLIC BLOOD PRESSURE: 82 MMHG

## 2020-03-31 VITALS — BODY MASS INDEX: 25.37 KG/M2 | WEIGHT: 176.81 LBS

## 2020-03-31 DIAGNOSIS — I81 PORTAL VEIN THROMBOSIS: ICD-10-CM

## 2020-03-31 DIAGNOSIS — Z51.11 CHEMOTHERAPY MANAGEMENT, ENCOUNTER FOR: ICD-10-CM

## 2020-03-31 DIAGNOSIS — C22.0 HEPATOCELLULAR CARCINOMA: Primary | ICD-10-CM

## 2020-03-31 DIAGNOSIS — R16.0 LIVER MASS: ICD-10-CM

## 2020-03-31 DIAGNOSIS — G89.3 NEOPLASM RELATED PAIN: ICD-10-CM

## 2020-03-31 DIAGNOSIS — C22.0 HEPATOCELLULAR CARCINOMA: ICD-10-CM

## 2020-03-31 DIAGNOSIS — Z71.89 ACP (ADVANCE CARE PLANNING): ICD-10-CM

## 2020-03-31 DIAGNOSIS — R94.6 ABNORMAL RESULTS OF THYROID FUNCTION STUDIES: ICD-10-CM

## 2020-03-31 PROCEDURE — 63600175 PHARM REV CODE 636 W HCPCS: Performed by: INTERNAL MEDICINE

## 2020-03-31 PROCEDURE — 96375 TX/PRO/DX INJ NEW DRUG ADDON: CPT

## 2020-03-31 PROCEDURE — 96413 CHEMO IV INFUSION 1 HR: CPT

## 2020-03-31 PROCEDURE — 99999 PR PBB SHADOW E&M-EST. PATIENT-LVL III: CPT | Mod: PBBFAC,,, | Performed by: INTERNAL MEDICINE

## 2020-03-31 PROCEDURE — 99999 PR PBB SHADOW E&M-EST. PATIENT-LVL III: ICD-10-PCS | Mod: PBBFAC,,, | Performed by: INTERNAL MEDICINE

## 2020-03-31 PROCEDURE — 99215 OFFICE O/P EST HI 40 MIN: CPT | Mod: 25,S$PBB,, | Performed by: INTERNAL MEDICINE

## 2020-03-31 PROCEDURE — 99213 OFFICE O/P EST LOW 20 MIN: CPT | Mod: PBBFAC,25 | Performed by: INTERNAL MEDICINE

## 2020-03-31 PROCEDURE — 99215 PR OFFICE/OUTPT VISIT, EST, LEVL V, 40-54 MIN: ICD-10-PCS | Mod: 25,S$PBB,, | Performed by: INTERNAL MEDICINE

## 2020-03-31 RX ORDER — OXYCODONE HYDROCHLORIDE 5 MG/1
5 TABLET ORAL EVERY 4 HOURS PRN
Qty: 90 TABLET | Refills: 0 | Status: SHIPPED | OUTPATIENT
Start: 2020-03-31

## 2020-03-31 RX ORDER — ONDANSETRON HYDROCHLORIDE 8 MG/1
8 TABLET, FILM COATED ORAL EVERY 8 HOURS PRN
Qty: 60 TABLET | Refills: 2 | Status: SHIPPED | OUTPATIENT
Start: 2020-03-31 | End: 2021-03-31

## 2020-03-31 RX ORDER — FENTANYL 75 UG/H
1 PATCH TRANSDERMAL
Qty: 10 PATCH | Refills: 0 | Status: SHIPPED | OUTPATIENT
Start: 2020-03-31 | End: 2020-03-31 | Stop reason: SDUPTHER

## 2020-03-31 RX ORDER — OXYCODONE HYDROCHLORIDE 5 MG/1
5 TABLET ORAL EVERY 4 HOURS PRN
Qty: 90 TABLET | Refills: 0 | Status: SHIPPED | OUTPATIENT
Start: 2020-03-31 | End: 2020-03-31 | Stop reason: SDUPTHER

## 2020-03-31 RX ORDER — ONDANSETRON 2 MG/ML
8 INJECTION INTRAMUSCULAR; INTRAVENOUS
Status: COMPLETED | OUTPATIENT
Start: 2020-03-31 | End: 2020-03-31

## 2020-03-31 RX ORDER — HEPARIN 100 UNIT/ML
500 SYRINGE INTRAVENOUS
Status: CANCELLED | OUTPATIENT
Start: 2020-03-31

## 2020-03-31 RX ORDER — FENTANYL 75 UG/H
1 PATCH TRANSDERMAL
Qty: 10 PATCH | Refills: 0 | Status: SHIPPED | OUTPATIENT
Start: 2020-03-31 | End: 2020-04-03 | Stop reason: SDUPTHER

## 2020-03-31 RX ORDER — SODIUM CHLORIDE 0.9 % (FLUSH) 0.9 %
10 SYRINGE (ML) INJECTION
Status: CANCELLED | OUTPATIENT
Start: 2020-03-31

## 2020-03-31 RX ORDER — PROCHLORPERAZINE MALEATE 10 MG
10 TABLET ORAL EVERY 6 HOURS PRN
Qty: 60 TABLET | Refills: 1 | Status: SHIPPED | OUTPATIENT
Start: 2020-03-31 | End: 2021-03-31

## 2020-03-31 RX ORDER — ONDANSETRON HYDROCHLORIDE 8 MG/1
8 TABLET, FILM COATED ORAL EVERY 8 HOURS PRN
Qty: 60 TABLET | Refills: 2 | Status: SHIPPED | OUTPATIENT
Start: 2020-03-31 | End: 2020-03-31 | Stop reason: SDUPTHER

## 2020-03-31 RX ORDER — PROCHLORPERAZINE MALEATE 10 MG
10 TABLET ORAL EVERY 6 HOURS PRN
Qty: 60 TABLET | Refills: 1 | Status: SHIPPED | OUTPATIENT
Start: 2020-03-31 | End: 2020-03-31 | Stop reason: SDUPTHER

## 2020-03-31 RX ADMIN — ONDANSETRON 8 MG: 2 INJECTION INTRAMUSCULAR; INTRAVENOUS at 10:03

## 2020-03-31 RX ADMIN — SODIUM CHLORIDE 480 MG: 9 INJECTION, SOLUTION INTRAVENOUS at 10:03

## 2020-03-31 NOTE — PROGRESS NOTES
Pt requested to speak with . Sw met with patient in lobby. Pt requested a gas card. Sw provided pt with $25 gas card. No further needs at this time.

## 2020-04-02 ENCOUNTER — OFFICE VISIT (OUTPATIENT)
Dept: PALLIATIVE MEDICINE | Facility: CLINIC | Age: 80
End: 2020-04-02
Payer: MEDICARE

## 2020-04-02 DIAGNOSIS — C22.0 HEPATOCELLULAR CARCINOMA: ICD-10-CM

## 2020-04-02 DIAGNOSIS — R23.8 SKIN BREAKDOWN: ICD-10-CM

## 2020-04-02 DIAGNOSIS — R11.2 NAUSEA AND VOMITING, INTRACTABILITY OF VOMITING NOT SPECIFIED, UNSPECIFIED VOMITING TYPE: ICD-10-CM

## 2020-04-02 DIAGNOSIS — G89.3 CANCER ASSOCIATED PAIN: Primary | ICD-10-CM

## 2020-04-02 PROCEDURE — 99214 OFFICE O/P EST MOD 30 MIN: CPT | Mod: 95,,, | Performed by: FAMILY MEDICINE

## 2020-04-02 PROCEDURE — 99214 PR OFFICE/OUTPT VISIT, EST, LEVL IV, 30-39 MIN: ICD-10-PCS | Mod: 95,,, | Performed by: FAMILY MEDICINE

## 2020-04-02 NOTE — PROGRESS NOTES
Subjective:       Patient ID: Ottoniel Arellano Jr. is a 79 y.o. male.    Chief Complaint: palliative medicine follow up    The patient location is: [place]  The chief complaint leading to consultation is: palliative med f/u  Visit type: Virtual visit with synchronous audio and video  Total time spent with patient: 20 minutes  Each patient to whom he or she provides medical services by telemedicine is:  (1) informed of the relationship between the physician and patient and the respective role of any other health care provider with respect to management of the patient; and (2) notified that he or she may decline to receive medical services by telemedicine and may withdraw from such care at any time.    Notes: 78 yo male seen for palliative med follow up; his wife is present for visit. He currently has assistance of home health. Recently hospitalized due to abdominal pain, weakness, N/V. Poor health literacy noted by inpatient palliative med provider--patient's wife managed medication and was not understanding dosing regimen with methadone. Switched to fentanyl and oxycodone with good pain relief achieved. They continue to use these medications and seem to be doing well with appropriate use and are achieving good symptom control. They have noticed redness to sacrum after lying down or sitting and request skin protective patch that was used in the hospital.                does not have any pertinent problems on file.  Past Medical History:   Diagnosis Date    Arthritis     Coronary artery disease     HCC (hepatocellular carcinoma)     Liver mass      Past Surgical History:   Procedure Laterality Date    ANGIOGRAM, CORONARY, WITH LEFT HEART CATHETERIZATION      stents 12/29/2012    EYE SURGERY      right, skin graft    FLUOROSCOPY N/A 1/21/2020    Procedure: Y90 Mapping;  Surgeon: Ar Ham MD;  Location[facility] C[facility] LAB;  Service: General;  Laterality: N/A;    FLUOROSCOPY N/A 2/4/2020    Procedure: TheraSphere Liver  Mass;  Surgeon: Ar Ham MD;  Location: [facility] CATH LAB;  Service: General;  Laterality: N/A;    SPINAL FUSION      ACDF 05/04/2010    TONSILLECTOMY       History reviewed. No pertinent family history.  Social History     Socioeconomic History    Marital status:      Spouse name: Not on file    Number of children: Not on file    Years of education: Not on file    Highest education level: Not on file   Occupational History    Not on file   Social Needs    Financial resource strain: Not on file    Food insecurity:     Worry: Not on file     Inability: Not on file    Transportation needs:     Medical: Not on file     Non-medical: Not on file   Tobacco Use    Smoking status: Former Smoker    Smokeless tobacco: Former User     Quit date: 5/18/1986   Substance and Sexual Activity    Alcohol use: No    Drug use: Not on file    Sexual activity: Not on file   Lifestyle    Physical activity:     Days per week: Not on file     Minutes per session: Not on file    Stress: Not on file   Relationships    Social connections:     Talks on phone: Not on file     Gets together: Not on file     Attends Hinduism service: Not on file     Active member of club or organization: Not on file     Attends meetings of clubs or organizations: Not on file     Relationship status: Not on file   Other Topics Concern    Not on file   Social History Narrative    Not on file     Review of Systems   A comprehensive 14-point review of systems was reviewed with patient and was negative other than as specified above.     Objective:   There were no vitals filed for this visit.     Physical Exam   Constitutional: He is oriented to person, place, and time. He appears well-developed and well-nourished. No distress.   HENT:   Head: Normocephalic and atraumatic.   Eyes: EOM are normal.   Neck: Normal range of motion.   Pulmonary/Chest: Effort normal. No respiratory distress.   Musculoskeletal: Normal range of motion.    Neurological: He is alert and oriented to person, place, and time.   Skin: No rash noted.   Psychiatric: He has a normal mood and affect. His behavior is normal. Thought content normal.       Assessment:       1. Cancer associated pain    2. Nausea and vomiting, intractability of vomiting not specified, unspecified vomiting type    3. Skin breakdown    4. Hepatocellular carcinoma        Plan:           Problem List Items Addressed This Visit        Derm    Skin breakdown    Overview     Will consult with home health nurse and get plan in place to prevent sacral skin breakdown            Oncology    Hepatocellular carcinoma    Relevant Medications    fentaNYL (DURAGESIC) 75 mcg/hr (Start on 5/3/2020)       GI    Nausea & vomiting    Current Assessment & Plan     Advised family to contact us so we can aggressively manage symptoms as they arise to hopefully avoid repeat hospitalization.            Other Visit Diagnoses     Cancer associated pain    -  Primary    Relevant Medications    fentaNYL (DURAGESIC) 75 mcg/hr (Start on 5/3/2020)

## 2020-04-03 ENCOUNTER — TELEPHONE (OUTPATIENT)
Dept: HEMATOLOGY/ONCOLOGY | Facility: CLINIC | Age: 80
End: 2020-04-03

## 2020-04-03 DIAGNOSIS — C22.0 HEPATOCELLULAR CARCINOMA: ICD-10-CM

## 2020-04-03 RX ORDER — FENTANYL 75 UG/H
1 PATCH TRANSDERMAL
Qty: 10 PATCH | Refills: 0 | Status: SHIPPED | OUTPATIENT
Start: 2020-04-03 | End: 2020-04-07 | Stop reason: SDUPTHER

## 2020-04-03 NOTE — TELEPHONE ENCOUNTER
Patient wife did not answer when I returned her phone call about rx I left a voicemail asking for a call back.  ----- Message from Jim Mcintosh sent at 4/3/2020 11:00 AM CDT -----  Contact: Pt wife Maddy  Pt wife Maddy called and needs help with prescription for the pt    Maddy can be reached at [phone]

## 2020-04-07 ENCOUNTER — TELEPHONE (OUTPATIENT)
Dept: PALLIATIVE MEDICINE | Facility: CLINIC | Age: 80
End: 2020-04-07

## 2020-04-07 PROBLEM — R23.8 SKIN BREAKDOWN: Status: ACTIVE | Noted: 2020-04-07

## 2020-04-07 PROCEDURE — G0179 PR HOME HEALTH MD RECERTIFICATION: ICD-10-PCS | Mod: ,,, | Performed by: INTERNAL MEDICINE

## 2020-04-07 PROCEDURE — G0179 MD RECERTIFICATION HHA PT: HCPCS | Mod: ,,, | Performed by: INTERNAL MEDICINE

## 2020-04-07 RX ORDER — FENTANYL 75 UG/H
1 PATCH TRANSDERMAL
Qty: 10 PATCH | Refills: 0 | Status: SHIPPED | OUTPATIENT
Start: 2020-05-03

## 2020-04-07 NOTE — TELEPHONE ENCOUNTER
Returned patient call, no answer, left message with my direct line.  Called both home and cell number.    Madonna Lawson RN  Palliative Care  [phone]

## 2020-04-07 NOTE — ASSESSMENT & PLAN NOTE
Advised family to contact us so we can aggressively manage symptoms as they arise to hopefully avoid repeat hospitalization.

## 2020-04-08 ENCOUNTER — EXTERNAL HOME HEALTH (OUTPATIENT)
Dept: HOME HEALTH SERVICES | Facility: HOSPITAL | Age: 80
End: 2020-04-08
Payer: MEDICARE

## 2020-04-16 ENCOUNTER — PATIENT MESSAGE (OUTPATIENT)
Dept: PHARMACY | Facility: CLINIC | Age: 80
End: 2020-04-16

## 2020-04-17 ENCOUNTER — DOCUMENT SCAN (OUTPATIENT)
Dept: HOME HEALTH SERVICES | Facility: HOSPITAL | Age: 80
End: 2020-04-17
Payer: MEDICARE

## 2020-04-17 ENCOUNTER — TELEPHONE (OUTPATIENT)
Dept: HEMATOLOGY/ONCOLOGY | Facility: CLINIC | Age: 80
End: 2020-04-17

## 2020-04-17 DIAGNOSIS — C22.0 HEPATOCELLULAR CARCINOMA: Primary | ICD-10-CM

## 2020-04-17 NOTE — TELEPHONE ENCOUNTER
Note      Patient and wife is wanting hospice patient wife verbalized patient can't eat. He is achey all over, and also has a swollen stomach, and ankles. Patient verbalized that he is going down. Dr. Kevin has been advised via in basket message awaiting his response to advise on patient.

## 2020-04-22 ENCOUNTER — TELEPHONE (OUTPATIENT)
Dept: HEMATOLOGY/ONCOLOGY | Facility: CLINIC | Age: 80
End: 2020-04-22

## 2020-04-22 NOTE — TELEPHONE ENCOUNTER
Return a phone call to Vicenta at [facility] in regards to patient needing orders for Dr. Kevin to fill out, med questions, and also confirmed receipt of referral.

## 2020-04-23 ENCOUNTER — TELEPHONE (OUTPATIENT)
Dept: INTERNAL MEDICINE | Facility: CLINIC | Age: 80
End: 2020-04-23

## 2020-04-23 NOTE — TELEPHONE ENCOUNTER
----- Message from Day Lucio sent at 4/23/2020  1:49 PM CDT -----  Contact: [facility]  They are requesting call back in regards to pt being new admit and is needing to discuss medication        Pls call back at  [phone]

## 2020-04-23 NOTE — TELEPHONE ENCOUNTER
Can you review his pain medications. He has bee admitted to hospice and they need to know what he is suppose to be on . I gave them the Fentanyl patches, oxycodone but she mention the  methadone but I see that it had been discontinued .

## 2020-04-23 NOTE — TELEPHONE ENCOUNTER
You're correct that the methadone was discontinued; let them know his wife had trouble dosing his medication so fentanyl has been a better option.

## 2021-12-15 NOTE — DISCHARGE INSTRUCTIONS
Recovery After Procedural Sedation (Adult)  You have been given medicine by vein to make you sleep during your surgery. This may have included both a pain medicine and sleeping medicine. Most of the effects have worn off. But you may still have some drowsiness for the next 6 to 8 hours.  Home care  Follow these guidelines when you get home:  · For the next 8 hours, you should be watched by a responsible adult. This person should make sure your condition is not getting worse.  · Don't drink any alcohol for the next 24 hours.  · Don't drive, operate dangerous machinery, or make important business or personal decisions during the next 24 hours.  Note: Your healthcare provider may tell you not to take any medicine by mouth for pain or sleep in the next 4 hours. These medicines may react with the medicines you were given in the hospital. This could cause a much stronger response than usual.  Follow-up care  Follow up with your healthcare provider if you are not alert and back to your usual level of activity within 12 hours.  When to seek medical advice  Call your healthcare provider right away if any of these occur:  · Drowsiness gets worse  · Weakness or dizziness gets worse  · Repeated vomiting  · You can't be awakened   Date Last Reviewed: 10/18/2016  © 8913-8760 [employer]. [address] [place] [address], [place] [address]. All rights reserved. This information is not intended as a substitute for professional medical care. Always follow your healthcare professional's instructions.  Discharge Instructions for Liver Biopsy  You had a procedure called liver biopsy. A healthcare provider used a special needle to remove a small piece of tissue from your liver. Then it was examined for signs of damage or disease. A liver biopsy is ordered after other tests have shown that your liver is not working properly. You may also have a liver biopsy when liver disease is suspected, to determine whether there is too much  iron in the liver, or to rule out cancer.  Home care  Recommendations include the following:   · Because you had anesthesia, you should not drive until the day after your biopsy.   · Remove the bandage covering the biopsy site 48 hours after the procedure.  · Rest for 6 hours and take it easy when you arrive home.  · Dont shower for 24 hours after the biopsy. If you wish, you may wash yourself with a sponge or washcloth. When you are able to shower, dont scrub the site. Gently wash the area and pat it dry.  · Dont lift anything heavier than 10 pounds for up to 1 week after the procedure, or as advised by your healthcare provider.  · Don't do strenuous activities or exercises for up to 1 week after the procedure.  · Ask your healthcare provider when you can return to work.  · Do not start taking blood thinners without clear instructions from your healthcare provider.  Follow-up care  Make a follow-up appointment as directed by our staff.     When to call your healthcare provider  Call your healthcare provider immediately if you have any of the following:  · Bleeding from the biopsy site  · Dizziness or lightheadedness  · Sudden or increased shortness of breath  · Sudden chest pain  · Fever of 100.4°F (38.0°C) or higher, or as directed by your healthcare provider  · Shaking chills  · Yellow eyes or skin  · Increasing redness, tenderness, or swelling at the biopsy site  · Drainage from the biopsy site  · Opening of the biopsy site  · Vomiting blood  · Rectal bleeding or bloody stools  · Increasing pain, with or without activity, in the liver or belly area, or pain shooting to the right shoulder   Date Last Reviewed: 7/1/2016 © 2000-2017 [employer]. [address] [place] [address]. All rights reserved. This information is not intended as a substitute for professional medical care. Always follow your healthcare professional's instructions.         Erythromycin Counseling:  I discussed with the patient the risks of erythromycin including but not limited to GI upset, allergic reaction, drug rash, diarrhea, increase in liver enzymes, and yeast infections.

## 2025-05-18 NOTE — NURSING
"Upon entering room for med pass, pt states " I fell in this restroom." He was sitting on the couch, when he had to got to the restroom. Pt did not call for assistance prior to getting up. Pt educated on using call light. Bed alarm in place. Non skid socks in place. Pt AAOx4, verbal, clear speech, denies hitting head, states pt was going to sit on toilet, landed on backside and he hit his right elbow on toilet in restroom, after he became dizzy in restroom. Avysys placed and chair alarm placed for fall prevention. "Knot" noted to right elbow, no redness. Full active ROM to RUE. No assessable injury noted to backside. Orthostatic vitals taken, negative. Charge nurse and MD notified. Pt notified wife via phone. Pt placed back in bed, bed alarm on. Will continue plan of care.   "
Pt given discharge instructions verbally and written. Pt verbalize understanding. Pt given hard script for Fentanyl. Pt transported to personal vehicle via wheelchair, per staff. IV d/'d cath intact. Telemonitor removed and placed in monitor room.   
(V5) oriented